# Patient Record
Sex: MALE | Race: WHITE | Employment: OTHER | URBAN - METROPOLITAN AREA
[De-identification: names, ages, dates, MRNs, and addresses within clinical notes are randomized per-mention and may not be internally consistent; named-entity substitution may affect disease eponyms.]

---

## 2017-01-01 ENCOUNTER — HOSPITAL ENCOUNTER (OUTPATIENT)
Dept: OTHER | Age: 77
Discharge: HOME | End: 2017-01-31
Attending: FAMILY MEDICINE | Admitting: INTERNAL MEDICINE

## 2017-05-24 PROBLEM — M17.11 OSTEOARTHRITIS OF RIGHT KNEE: Status: ACTIVE | Noted: 2017-05-24

## 2017-06-15 ENCOUNTER — HOSPITAL ENCOUNTER (OUTPATIENT)
Dept: ULTRASOUND IMAGING | Age: 77
Discharge: HOME | End: 2017-06-15
Attending: FAMILY MEDICINE | Admitting: FAMILY MEDICINE

## 2017-06-15 DIAGNOSIS — I71.40 ABDOMINAL AORTIC ANEURYSM WITHOUT RUPTURE: ICD-10-CM

## 2017-08-03 ENCOUNTER — HOSPITAL ENCOUNTER (OUTPATIENT)
Dept: PHYSICAL THERAPY | Age: 77
Discharge: HOME | End: 2017-08-31
Attending: FAMILY MEDICINE | Admitting: FAMILY MEDICINE

## 2017-08-04 ASSESSMENT — PAIN DESCRIPTION - LOCATION: LOCATION: BACK

## 2017-08-04 ASSESSMENT — PAIN DESCRIPTION - DESCRIPTORS: DESCRIPTORS: ACHING

## 2017-08-04 ASSESSMENT — PAIN SCALES - GENERAL: PAINLEVEL_OUTOF10: 2

## 2017-08-04 ASSESSMENT — PAIN DESCRIPTION - ORIENTATION: ORIENTATION: LOWER

## 2017-08-15 ENCOUNTER — HOSPITAL ENCOUNTER (OUTPATIENT)
Dept: PHYSICAL THERAPY | Age: 77
Discharge: HOME | End: 2017-08-15
Admitting: FAMILY MEDICINE

## 2017-08-21 ENCOUNTER — HOSPITAL ENCOUNTER (OUTPATIENT)
Dept: PHYSICAL THERAPY | Age: 77
Discharge: HOME | End: 2017-08-21
Admitting: FAMILY MEDICINE

## 2017-08-25 ENCOUNTER — HOSPITAL ENCOUNTER (OUTPATIENT)
Dept: PHYSICAL THERAPY | Age: 77
Discharge: HOME | End: 2017-08-25
Admitting: FAMILY MEDICINE

## 2017-09-01 ENCOUNTER — HOSPITAL ENCOUNTER (OUTPATIENT)
Dept: OTHER | Age: 77
Discharge: HOME | End: 2017-09-30
Attending: FAMILY MEDICINE | Admitting: FAMILY MEDICINE

## 2017-11-14 ENCOUNTER — HOSPITAL ENCOUNTER (OUTPATIENT)
Dept: PHYSICAL THERAPY | Age: 77
Discharge: HOME | End: 2017-11-30
Attending: ORTHOPAEDIC SURGERY | Admitting: ORTHOPAEDIC SURGERY

## 2017-11-14 ASSESSMENT — PAIN DESCRIPTION - FREQUENCY: FREQUENCY: CONTINUOUS

## 2017-11-14 ASSESSMENT — PAIN DESCRIPTION - ONSET: ONSET: PROGRESSIVE

## 2017-11-14 ASSESSMENT — PAIN DESCRIPTION - DESCRIPTORS: DESCRIPTORS: ACHING;SHARP

## 2017-11-14 ASSESSMENT — PAIN DESCRIPTION - PAIN TYPE: TYPE: SURGICAL PAIN;CHRONIC PAIN

## 2017-11-14 ASSESSMENT — PAIN DESCRIPTION - ORIENTATION: ORIENTATION: RIGHT

## 2017-11-14 ASSESSMENT — PAIN SCALES - GENERAL: PAINLEVEL_OUTOF10: 4

## 2017-11-14 ASSESSMENT — PAIN DESCRIPTION - LOCATION: LOCATION: BACK;LEG;KNEE

## 2017-11-14 ASSESSMENT — PAIN DESCRIPTION - PROGRESSION: CLINICAL_PROGRESSION: GRADUALLY IMPROVING

## 2017-11-14 NOTE — PROGRESS NOTES
Physical Therapy  Initial Assessment  Date: 2017  Patient Name: Rey Crockett  MRN: [id number]  : 1940     Treatment Diagnosis: R knee pain, stiffness, LBP Hx    Restrictions  Position Activity Restriction  Other position/activity restrictions: please review Medical Hx--cardiac, AAA, DM, back injury. ..... 20# lifting limit    Subjective   General  Chart Reviewed: Yes  Patient assessed for rehabilitation services?: Yes  Additional Pertinent Hx: please see electronic medical record for co-morbidities, several significant ones. DOS 10/16/17, Pain for about 2 years and just couldn't tolerate any more. L knee is not as bad as R was. Back pain still an issue and sees pain management for this. Stayed 3 days in hospital and some PT there, home care PT ended last week. Did have some medication issues postop and used CPM about 8 days but bothered LB so stopped. Family / Caregiver Present: Yes  Referring Practitioner: Roney Benson  Diagnosis: R TKA  General Comment  Comments: sees new pain doc today so will know more about this management after today. Subjective  Subjective: Pain is up and down, some pain w/ bending and straightening and walking. Using walker mostly outside home but at times nothing at home. No AD used before surgery. Ice helps some. rest too prn  Pain Screening  Patient Currently in Pain: Yes  Pain Assessment  Pain Assessment: 0-10  Pain Level: 4 (max still up to 10/10 but not all day)  Pain Type: Surgical pain;Chronic pain  Pain Location: Back;Leg;Knee  Pain Orientation: Right (central LB too)  Pain Radiating Towards: min from knee  Pain Descriptors: Aching; Sharp  Pain Frequency: Continuous  Pain Onset: Progressive  Clinical Progression: Gradually improving  Effect of Pain on Daily Activities: limits walking and WB activity and does wake him at night still too w/ some sleep loss  Patient's Stated Pain Goal: No pain (knee)  Vital Signs  Patient Currently in Pain: Yes    Vision/Hearing  Vision  Vision: Impaired (reading)  Hearing  Hearing: Exceptions to [facility]  Hearing Exceptions: Right hearing aid (but should have both)    Orientation  Orientation  Overall Orientation Status: Within Normal Limits    Social/Functional History  Social/Functional History  Lives With: Spouse  Type of Home: House  Home Layout: One level  Home Access: Stairs to enter with rails  Bathroom Equipment: Shower chair  Home Equipment: Rolling walker;Cane  Occupation: Retired  Leisure & Hobbies: walking  Objective     Observation/Palpation  Palpation: no warmth noted and min to mod TTP joint lines and around incision. Observation: walking w/ rolling walker but can take steps w/o AD. Edema: min noted    AROM RLE (degrees)  RLE General AROM: 8 from 0 on ext, flex 100    Strength RLE  Strength RLE: WFL  Comment: not true MMT done d/t postop, but good muscle contractions and no pain for knee and ankle 5/5  Strength LLE  Strength LLE: WNL     Additional Measures  Special Tests: NT d/t postop   Other: gait w/ straight cane, 30ft and did very well but some cues for pattern   Assessment   Conditions Requiring Skilled Therapeutic Intervention  Body structures, Functions, Activity limitations: Decreased functional mobility ; Decreased ADL status; Decreased ROM; Decreased endurance;Decreased balance;Decreased strength  Assessment: Pt is a 64 yo male who presents almost one month postop R TKA, but w/ significant medical Hx for back and cardiac issues. He demonstrates limited ROM and strength in R LE w/ decreased overall endurance and limited WB, decreased balance, and gait tolerance. He will beneift from PT to help restore his ROM, strength and balance and improve WB activity endurance/tolerance. Prior to 2015 he was able to walk 5-10 mins w/ min pain. Patient agrees with established plan of care and assisted in the development of their short term and long term goals.  Patient had no adverse reaction with initial treatment and there are no barriers to learning. Demonstrates no mental or cognitive disorder. Treatment Diagnosis: R knee pain, stiffness, LBP Hx  Prognosis: Excellent  Decision Making: Medium Complexity  Patient Education: see flow sheet  Barriers to Learning: none  REQUIRES PT FOLLOW UP: Yes         Plan   Plan  Times per week: 2x/week, drop to 1x/week prn  Plan weeks: 6 weeks  Specific instructions for Next Treatment: Continue to work on ROM and strength, balance and gait to tolerance  Current Treatment Recommendations: Strengthening, ROM, Balance Training, Functional Mobility Training, Endurance Training, Gait Training, Neuromuscular Re-education, Home Exercise Program, Modalities, Pain Management, Manual Therapy - Joint Manipulation, Manual Therapy - Soft Tissue Mobilization, Stair training    G-Code  PT G-Codes  Functional Assessment Tool Used: KOOS  Score: 46%  Functional Limitation: Mobility: Walking and moving around  Mobility: Walking and Moving Around Current Status (): At least 40 percent but less than 60 percent impaired, limited or restricted  Mobility: Walking and Moving Around Goal Status ():  At least 1 percent but less than 20 percent impaired, limited or restricted    OutComes Score  KOOS 46%  Goals  Short term goals  Time Frame for Short term goals: 3 weeks, 12/1/17  Short term goal 1: Pt will be able to report at least 25% reduction in overall pain  Short term goal 2: Pt will be able to walk 110ft w/o AD w/ good gait [facility]  Short term goal 3: Pt will be able to tolerate slowly advancing ROM and strength w/o increased back pain  Long term goals  Time Frame for Long term goals : 6 weeks, 12/22/17  Long term goal 1: Pt will be independent w/ HEP and able to continue to manage his pain on his own after PT  Long term goal 2: Pt will be able to walk >150 ft w/o AD and min pain or fatigue  Long term goal 3: Pt will have sufficient ROM for stairs and bending activity  Long term goal 4: Pt will have min knee pain w/ usual WB activity  Patient Goals   Patient goals : reduce pain, just make it another year, get around better, walk longer distances.          Cristi Worley, PT   PT, MPT, ATC     11/14/2017, 10:12 AM

## 2017-11-14 NOTE — PLAN OF CARE
[] Aquatic Therapy       Other:    ? Frequency/Duration:  # Days per week: [] 1 day # Weeks: [] 1 week [] 5 weeks     [x] 2 days?    [] 2 weeks [x] 6 weeks     [] 3 days   [] 3 weeks [] 7 weeks     [] 4 days   [] 4 weeks [] 8 weeks         [] 9 weeks [] 10 weeks         [] 11 weeks [] 12 weeks    Rehab Potential/Progress: [] Excellent [x] Good [] Fair  [] Poor     Goals:      Short term goals  Time Frame for Short term goals: 3 weeks, 12/1/17  Short term goal 1: Pt will be able to report at least 25% reduction in overall pain  Short term goal 2: Pt will be able to walk 110ft w/o AD w/ good gait mech  Short term goal 3: Pt will be able to tolerate slowly advancing ROM and strength w/o increased back pain  Long term goals  Time Frame for Long term goals : 6 weeks, 12/22/17  Long term goal 1: Pt will be independent w/ HEP and able to continue to manage his pain on his own after PT  Long term goal 2: Pt will be able to walk >150 ft w/o AD and min pain or fatigue  Long term goal 3: Pt will have sufficient ROM for stairs and bending activity  Long term goal 4: Pt will have min knee pain w/ usual WB activity    G-Code Selection: (On Eval and every 10th visit or Discharge)  MEASURE  [x] Mobility: Walking and Moving Around     [x] Current ()   [x] Goal ()   [] DC ()  [] Changing/Maintaining Body Position     [] Current (8981)      [] Goal ()   [] DC ()  [] Carrying / Moving / Handling Objects     [] Current ()   [] Goal ()   [] DC ()  [] Self-Care     [] Current ()   [] Goal ()   [] DC ()  [] Other PT/OT primary DX     [] Current ()   [] Goal ()   [] DC ()    SEVERITY  CURRENT  GOAL  DISCHARGE   [] CH (0% Impaired, Indep.)  [] CI (1-19% Impaired, SBA-CGA)  [] CJ (20-39% Impaired, MIN A)  [x] CK  (40-59% Impairment, Mod A)  [] CL  (60-79% Impairment, Max A)  [] CM  (80-99% Impairment, Dep.)   [] CN  (100% Impairment, Tot Dep.) [] CH (0% Impaired, Indep.)  [x] CI (1-19% Impaired, SBA-CGA)  [] CJ (20-39% Impaired, MIN A)  [] CK  (40-59% Impairment, Mod A)  [] CL  (60-79% Impairment, Max A)  [] CM  (80-99% Impairment, Dep.)   [] CN  (100% Impairment, Tot Dep.)  [] CH (0% Impaired, Indep.)  [] CI (1-19% Impaired, SBA-CGA)  [] CJ (20-39% Impaired, MIN A)  [] CK  (40-59% Impairment, Mod A)  [] CL  (60-79% Impairment, Max A)  [] CM  (80-99% Impairment, Dep.)   [] CN  (100% Impairment, Tot Dep.)          Electronically signed by:  Hillary Perkins, PT, MPT, ATC  11/14/2017, 11:12 AM    11/14/2017, 11:13 AM  If you have any questions or concerns, please don't hesitate to call.   Thank you for your referral.      Physician Signature:________________________________Date:_________ TIME: _____  By signing above, therapists plan is approved by physician

## 2017-11-14 NOTE — FLOWSHEET NOTE
squeeze   HEP                 Calf stretch, stand    instruct      Ham curl  --      Hip abd   --      Hip ext --      Balance: tandem   --      Partial squat   --                          gait Cane 30ftx2        Other Therapeutic Activities/Education:  Patient received education on their current pathology and how their condition effects them with their functional activities. Patient understood discussion and questions were answered. Patient understands their activity limitations and understands rational for treatment progression. Home Exercise Program:  Issued, practiced and pt demo ability to perform     Modality/intervention used:    [x] Therapeutic Exercise  [x] Modalities:  [x] Therapeutic Activity     [] Ultrasound  [] Elec  Stim  [] Gait Training      [] Cervical Traction [] Lumbar Traction  [x] Neuromuscular Re-education    [x] Cold/hotpack [] Iontophoresis   [x] Instruction in HEP      [] Vasopneumatic     [x] Manual Therapy               [] Aquatic Therapy     Manual Treatments:  PROM flex and ext 10'    Modalities:  CP to R knee 10' pt recumb    Communication with other providers:  POC faxed 11/14/17    Education provided to patient/caregiver: Adverse reactions to treatment:      Equipment provided:      Assessment:  Pt is a 64 yo male who presents almost one month postop R TKA, but w/ significant medical Hx for back and cardiac issues. He demonstrates limited ROM and strength in R LE w/ decreased overall endurance and limited WB, decreased balance, and gait tolerance. He will beneift from PT to help restore his ROM, strength and balance and improve WB activity endurance/tolerance. Prior to 2015 he was able to walk 5-10 mins w/ min pain. Patient agrees with established plan of care and assisted in the development of their short term and long term goals. Patient had no adverse reaction with initial treatment and there are no barriers to learning. Demonstrates no mental or cognitive disorder. Time In / Time Out:  0910/1005                 Timed Code/Total Treatment Minutes:  25/55    Patients Report of Tolerance:    [] Patient limited by fatigue        [x] Patient limited by pain   [] Patient limited by other medical complications   [] Other:     Prognosis:   [x] Good [] Fair  [] Poor    Plan:   [] Continue per plan of care [] Alter current plan (see comments)  [x] Plan of care initiated [] Hold pending MD visit [] Discharge    Plan for Next Session:   Continue to work on ROM and strength, balance and gait to tolerance       Next Progress Note due:   Visit 10         Electronically signed by:  Tisha Huddleston, PT, MPT, ATC  11/14/2017, 11:16 AM    11/14/2017, 11:16 AM

## 2017-11-17 ENCOUNTER — HOSPITAL ENCOUNTER (OUTPATIENT)
Dept: PHYSICAL THERAPY | Age: 77
Discharge: HOME | End: 2017-11-17
Admitting: ORTHOPAEDIC SURGERY

## 2017-11-20 ENCOUNTER — HOSPITAL ENCOUNTER (OUTPATIENT)
Dept: PHYSICAL THERAPY | Age: 77
Discharge: HOME | End: 2017-11-20
Admitting: ORTHOPAEDIC SURGERY

## 2017-11-20 NOTE — FLOWSHEET NOTE
ROM, strength, and decrease pain. Reports of soreness after treatment.     Time In / Time Out:  0832/5297    Timed Code/Total Treatment Minutes: 46'/46'  3 TE 46'    Patients Report of Tolerance:    [] Patient limited by fatigue        [x] Patient limited by pain   [] Patient limited by other medical complications   [] Other:     Prognosis:   [x] Good [] Fair  [] Poor    Plan:   [x] Continue per plan of care [] Alter current plan (see comments)  [] Plan of care initiated [] Hold pending MD visit [] Discharge    Plan for Next Session:   Please check on POC and re-fax prn  Continue to work on ROM and strength, balance and gait to tolerance       Next Progress Note due:   Visit 10         Electronically signed by:  Tammi Rodriguez PTA           11/20/2017, 5:07 PM

## 2017-11-22 ENCOUNTER — HOSPITAL ENCOUNTER (OUTPATIENT)
Dept: PHYSICAL THERAPY | Age: 77
Discharge: HOME | End: 2017-11-22
Admitting: ORTHOPAEDIC SURGERY

## 2017-11-28 ENCOUNTER — HOSPITAL ENCOUNTER (OUTPATIENT)
Dept: PHYSICAL THERAPY | Age: 77
Discharge: HOME | End: 2017-11-28
Admitting: ORTHOPAEDIC SURGERY

## 2017-11-28 NOTE — FLOWSHEET NOTE
Outpatient Physical Therapy           [facility]           [x] Phone: [phone]   Fax: [phone]  Yamilka haro           [] Phone: [phone]   Fax: [phone]    Physical Therapy Daily Treatment Note  Date:  2017    Patient Name:  Shandra Soliman    :  1940  MRN: [id number]  Restrictions/Precautions: please review Medical Hx--cardiac, AAA, DM, back injury. ..... 20# lifting limit  Diagnosis:   Diagnosis: R TKA  Date of Surgery:  10/16/17  Treatment Diagnosis: Treatment Diagnosis: R knee pain, stiffness, LBP Hx    Insurance/Certification information:  [employer], $368.77 at eval  Referring Physician:  Referring Practitioner: Aleida Jules Doctor Visit:    Plan of care signed (Y/N):  YES  Visit# / total visits:    POC and script  Pain level: 4/10       Goals:       Short term goals  Time Frame for Short term goals: 3 weeks, 17  Short term goal 1: Pt will be able to report at least 25% reduction in overall pain  Mostly met  Short term goal 2: Pt will be able to walk 110ft w/o AD w/ good gait mech  Good progress, cane now  Short term goal 3: Pt will be able to tolerate slowly advancing ROM and strength w/o increased back pain  Met so far  Long term goals  Time Frame for Long term goals : 6 weeks, 17  Long term goal 1: Pt will be independent w/ HEP and able to continue to manage his pain on his own after PT  Long term goal 2: Pt will be able to walk >150 ft w/o AD and min pain or fatigue  Long term goal 3: Pt will have sufficient ROM for stairs and bending activity  Long term goal 4: Pt will have min knee pain w/ usual WB activity         Subjective:   Patient states he's feeling ok today with just soreness on knee. Reports feeling great after he wake up, but felt soreness after a few hours. Any changes in Ambulatory Summary Sheet? NO        Objective:  Unstable while performing balance activities with LOB quiet a bit.  States felt the pull on thigh while performing SLR and knee lag noted. Fatigues toward the end of ext heel prop and quad set.        Exercises:  Watch back pain  Exercise/Equipment 11/14/17 11/17/17 11/20/17 11/22/17 11/28/17   Nustep or Rec bike -- nustep 5' Lv1 Nu step 5' Lv 3 Nu step 5' Lv 3 Nu step            Towel stretch   30\" 30\" 30\" Gt belt 30\"x2 Gt belt 30\"x2   Quad set heel up   5x5\" 10x5\" 10x5\" 15x5\"    Passive ext   Heel prop  3x20\"  30\" 1.30\" min  1.30\" min 1.5' x2 1x5' x2   SAQ   -- 20x AAROM 20xAAROM*   Heelslides   AAROM 20x AAROM 20x AAROM 20x 20x AAROM 20x AAROM   LAQ/flex     -- 20x hold 2\" ea end -   SLR    HEP AAROM 10x2 AAROM 10x2 10x2 10x2   Bridge/glut squeeze   HEP Column bridge x20 x20 forgot x20               Calf stretch, stand    instruct FR 30\" Right FR 30\" right FR 30\" x2 FR 30\"x2   Heel raises   -- Floor 10x2 Floor 10x2   Ham curl  -- 10x 10* ea 10x ea R/L 10x ea R/L   Hip abd   -- 10x 10* ea 10x ea R/L 10x ea R/L   Hip ext -- 10x 10* ea 10x ea R/L 10x ea R/L   Balance: tandem   -- R/L tandem 1x30\" occasional UE support R/L tandem 1x30\" occasional UE support 30\" ea, CGA 30\" ea CGA   Wobble board balance   -- 30\"ea, CGA 30\" ea, CGA   Partial squat   -- 10x 10x To YSB 10x, holding rail To YSB 10x, holding rail             Gait Cane 30ftx2 Cane in L, 45'x5  -- --      Other Therapeutic Activities/Education:      Home Exercise Program:   No change to the current exercise program.    Modality/intervention used:    [x] Therapeutic Exercise  [x] Modalities:  [x] Therapeutic Activity     [] Ultrasound  [] Elec  Stim  [] Gait Training      [] Cervical Traction [] Lumbar Traction  [x] Neuromuscular Re-education    [x] Cold/hotpack [] Iontophoresis   [x] Instruction in HEP      [] Vasopneumatic     [x] Manual Therapy               [] Aquatic Therapy     Manual Treatments:  Modalities:     Vaso low to R knee, pt supine, 10'  Communication with other providers:  POC faxed 11/14/17    Education provided to patient/caregiver:  I    Adverse reactions to

## 2017-12-01 ENCOUNTER — HOSPITAL ENCOUNTER (OUTPATIENT)
Dept: OTHER | Age: 77
Discharge: HOME | End: 2017-12-31
Attending: ORTHOPAEDIC SURGERY | Admitting: ORTHOPAEDIC SURGERY

## 2017-12-07 ENCOUNTER — HOSPITAL ENCOUNTER (OUTPATIENT)
Dept: PHYSICAL THERAPY | Age: 77
Discharge: HOME | End: 2017-12-07
Admitting: ORTHOPAEDIC SURGERY

## 2017-12-07 NOTE — FLOWSHEET NOTE
does not have any Red flags or absolute contraindications. Patient was instructed the use of the [employer] on the skin may cause some discomfort/pain ami of the skin, bruising or Petechiae. Patient understands these risks and has agreed to continue with the intervention. GT was applied to the R quad, ham, calf and anterior LE because the assessment demonstrated tightness of all . The appropriate GT tool/tools and which stroke technique utilized were determined based on the assessment and treatment goals. The patients skin at the end of the treatment showed minimal changes overall. Overall GT treatment time12', plus calf, ham and knee flex stretches. Modalities:     Vaso low to R knee, pt recumb w/ leg on blue elevator, 10'  Communication with other providers:  POC faxed 11/14/17    Education provided to patient/caregiver:  I    Adverse reactions to treatment:  none    Equipment provided:  none    Assessment:  Patient tolerated Rx fairly well today. He continues to lack some knee ext and demonstrage instability with balance activities as well. Patient will continue to benefit from further therapy to increase ROM, strength, and decrease pain. Reports of slight soreness and w/ 0-1/10 pain after treatment. Time In / Time Out:   0930/1030    Timed Code/Total Treatment Minutes:    50/60     Patients Report of Tolerance:    [] Patient limited by fatigue        [x] Patient limited by pain/stiffness   [] Patient limited by other medical complications   [] Other:     Prognosis:   [x] Good [] Fair  [] Poor    Plan:   [x] Continue per plan of care [] Alter current plan (see comments)  [] Plan of care initiated [] Hold pending MD visit [] Discharge    Plan for Next Session:   Continue to work on ROM and strength, balance and gait to tolerance. Eventually work into more dynamic balance activity as able.        Next Progress Note due:   Visit 10         Electronically signed by:  Hillary Perkins

## 2017-12-12 ENCOUNTER — HOSPITAL ENCOUNTER (OUTPATIENT)
Dept: PHYSICAL THERAPY | Age: 77
Discharge: HOME | End: 2017-12-12
Admitting: ORTHOPAEDIC SURGERY

## 2017-12-12 NOTE — FLOWSHEET NOTE
like fever or nausea, decreased appetite. Home Exercise Program:   No change to the current exercise program.    Modality/intervention used:    [x] Therapeutic Exercise  [x] Modalities:  [x] Therapeutic Activity     [] Ultrasound  [] Elec  Stim  [] Gait Training      [] Cervical Traction [] Lumbar Traction  [x] Neuromuscular Re-education    [x] Cold/hotpack [] Iontophoresis   [x] Instruction in HEP      [] Vasopneumatic     [x] Manual Therapy               [] Aquatic Therapy     Manual Treatments:              Modalities:     Vaso low to R knee, pt recumb w/ leg on blue elevator, 10'  Communication with other providers:  POC faxed 11/14/17    Education provided to patient/caregiver:  I    Adverse reactions to treatment:  none    Equipment provided:  none    Assessment:  Patient tolerated session well today without an increase in back or leg pain. Patient displays moderate deficits in balance with poor ankle/hip strategies for regaining balance when lost posteriorly. Making some good progress with knee ROM, although still lacking a few degrees of terminal knee extension. Patient will continue to benefit from skilled PT services in order to improve, strength, endurance, and balance to reduce pain, limitation and improve function. 4-5/10 pain in knee 0/10 pain in back after rx. Time In / Time Out:   1345/1435    Timed Code/Total Treatment Minutes:  40'/50' 1 vaso 10'   1 NR 8'   2 TE 32'    Patients Report of Tolerance:    [] Patient limited by fatigue        [x] Patient limited by pain/stiffness   [] Patient limited by other medical complications   [] Other:     Prognosis:   [x] Good [] Fair  [] Poor    Plan:   [x] Continue per plan of care [] Alter current plan (see comments)  [] Plan of care initiated [] Hold pending MD visit [] Discharge    Plan for Next Session:   Continue to work on ROM and strength, balance and gait to tolerance. Eventually work into more dynamic balance activity as able.  Monitor RUQ

## 2017-12-14 ENCOUNTER — HOSPITAL ENCOUNTER (OUTPATIENT)
Dept: PHYSICAL THERAPY | Age: 77
Discharge: HOME | End: 2017-12-14
Admitting: ORTHOPAEDIC SURGERY

## 2017-12-14 NOTE — FLOWSHEET NOTE
Outpatient Physical Therapy           [facility]           [x] Phone: [phone]   Fax: [phone]  Wilfredo Patterson           [] Phone: [phone]   Fax: [phone]    Physical Therapy Daily Treatment Note  Date:  2017    Patient Name:  Rosaura Aragon    :  1940  MRN: [id number]  Restrictions/Precautions: please review Medical Hx--cardiac, AAA, DM, back injury. ..... 20# lifting limit  Diagnosis:   Diagnosis: R TKA  Date of Surgery:  10/16/17  Treatment Diagnosis: Treatment Diagnosis: R knee pain, stiffness, LBP Hx    Insurance/Certification information:  [employer], $368.77 at eval  Referring Physician:  Referring Practitioner: Diane Rayo  Next Doctor Visit:  Feb  Plan of care signed (Y/N):  YES  Visit# / total visits:    POC and script  Pain level: 5/10 knee, 0/10 back        Goals:       Short term goals  Time Frame for Short term goals: 3 weeks, 17  Short term goal 1: Pt will be able to report at least 25% reduction in overall pain  Mostly met  Short term goal 2: Pt will be able to walk 110ft w/o AD w/ good gait mech  Good progress, cane now  Short term goal 3: Pt will be able to tolerate slowly advancing ROM and strength w/o increased back pain  Met so far  Long term goals  Time Frame for Long term goals : 6 weeks, 17  Long term goal 1: Pt will be independent w/ HEP and able to continue to manage his pain on his own after PT  Long term goal 2: Pt will be able to walk >150 ft w/o AD and min pain or fatigue  Long term goal 3: Pt will have sufficient ROM for stairs and bending activity  Long term goal 4: Pt will have min knee pain w/ usual WB activity         Subjective:  Patient reports that the knee is hurting him today on the inside, not sure why. Back is feeling fine. Pain in the RUQ is the same, no better/no worse. Any changes in Ambulatory Summary Sheet? Pt fell 17, sustained a vertebral Fx        Objective: Tender to palpation MCL.  Tightness distal quad but functional independence. Will continue to benefit from skilled PT services to improve gait safety, balance, and overall strength and endurance. 0/10 pain after rx. Time In / Time Out:  1343/1446    Timed Code/Total Treatment Minutes:  53'/63' 1 vaso 10'  1 NR 8'  1 man 10' 2 TE 33'    Patients Report of Tolerance:    [] Patient limited by fatigue        [x] Patient limited by pain/stiffness   [] Patient limited by other medical complications   [] Other:     Prognosis:   [x] Good [] Fair  [] Poor    Plan:   [x] Continue per plan of care [] Alter current plan (see comments)  [] Plan of care initiated [] Hold pending MD visit [] Discharge    Plan for Next Session:   Continue to work on ROM and strength, balance and gait to tolerance. Eventually work into more dynamic balance activity as able. Monitor RUQ pain.       Next Progress Note due:   Visit 10         Electronically signed by:  Khalif Morse PTA        12/14/2017, 8:16 AM

## 2017-12-19 ENCOUNTER — HOSPITAL ENCOUNTER (OUTPATIENT)
Dept: PHYSICAL THERAPY | Age: 77
Discharge: HOME | End: 2017-12-19
Admitting: ORTHOPAEDIC SURGERY

## 2017-12-19 NOTE — FLOWSHEET NOTE
Outpatient Physical Therapy           [place]           [x] Phone: [phone]   Fax: [phone]  [place]           [] Phone: [phone]   Fax: [phone]    Physical Therapy Daily Treatment Note  Date:  2017    Patient Name:  Rey Crockett    :  1940  MRN: [id number]  Restrictions/Precautions: please review Medical Hx--cardiac, AAA, DM, back injury. ..... 20# lifting limit  Diagnosis:   Diagnosis: R TKA  Date of Surgery:  10/16/17  Treatment Diagnosis: Treatment Diagnosis: R knee pain, stiffness, LBP Hx    Insurance/Certification information:  [employer], $368.77 at eval  Referring Physician:  Referring Practitioner: Roney Jules Doctor Visit:  Feb  Plan of care signed (Y/N):  YES  Visit# / total visits:    POC and script  Pain level: 3/10 knee, 0/10 back        Goals:       Short term goals  Time Frame for Short term goals: 3 weeks, 17  Short term goal 1: Pt will be able to report at least 25% reduction in overall pain  Mostly met  Short term goal 2: Pt will be able to walk 110ft w/o AD w/ good gait mech  Good progress, cane now  Short term goal 3: Pt will be able to tolerate slowly advancing ROM and strength w/o increased back pain  Met so far  Long term goals  Time Frame for Long term goals : 6 weeks, 17  Long term goal 1: Pt will be independent w/ HEP and able to continue to manage his pain on his own after PT  Long term goal 2: Pt will be able to walk >150 ft w/o AD and min pain or fatigue  Long term goal 3: Pt will have sufficient ROM for stairs and bending activity  Long term goal 4: Pt will have min knee pain w/ usual WB activity         Subjective:  Patient states that the knee has been hurting quite a bit for about a day after his therapy sessions. RUQ pain is getting better. Wife states that he tried to drive the other day and he was having a lot of trouble keeping the car on the road.  Patient states that he has a lot of difficulty lifting his foot/leg from the gas to the break states \"my leg feels like it weighs 100# pounds. \" Wife states that patient has been different the last few weeks, seeming more confused and forgetful. Not sure if maybe he is having more TIA's? BP has been higher too so has an appointment with heart doc on Thursday. Had another fall 2 days ago, went to sit on the couch and fell between the coffee table and the couch. Any changes in Ambulatory Summary Sheet? Pt fell 11/30/17, sustained a vertebral Fx        Objective: /86 HR 58 bpm            Exercises:  Watch back painand new Fx  Exercises 12/14/17 12/19/17          Nu-step (pillow behind back) L3 5' -      -    Balance   -    Tandem stance 30\" ea  -    Wobble board s/s, a/p 30\" ea  -    Airex marches  - -    Airex balance w/perturbations or reaching - -    Gait w/head turns 2*50' -      -    Strengthening   -    Standing HS curl 20* -    Standing hip abd  - -    Fitter leg press  1 Black Cord 2*10 -    Sit-Stand from mat table  -    Seated iso press ball into lap for abd brace 10*5\" -    Seated add squeeze w/abd brace 10*5\" -    LAQ  2*10 -    SAQ  2# 2*10 -    Glut squeezes  15*5\" -      -                              Other Therapeutic Activities/Education: Extensive discussion/education regarding patient's current condition, changes over the last week or so, tips for avoiding falls at home, appropriate exercises and when to take a day off from exercises according to symptoms. Discussed Ensure for proper nutrition and energy for daily activities.      Home Exercise Program:   No change to the current exercise program.    Modality/intervention used:    [x] Therapeutic Exercise  [x] Modalities:  [x] Therapeutic Activity     [] Ultrasound  [] Elec  Stim  [] Gait Training      [] Cervical Traction [] Lumbar Traction  [x] Neuromuscular Re-education    [x] Cold/hotpack [] Iontophoresis   [x] Instruction in HEP      [] Vasopneumatic     [x] Manual Therapy               [] Aquatic

## 2017-12-28 ENCOUNTER — HOSPITAL ENCOUNTER (OUTPATIENT)
Dept: PHYSICAL THERAPY | Age: 77
Discharge: HOME | End: 2017-12-28
Admitting: ORTHOPAEDIC SURGERY

## 2017-12-28 NOTE — FLOWSHEET NOTE
Outpatient Physical Therapy           [place]           [x] Phone: [phone]   Fax: [phone]  Ernestine Marilu           [] Phone: [phone]   Fax: [phone]    Physical Therapy Daily Treatment Note  Date:  2017    Patient Name:  Mark Hassan    :  1940  MRN: [id number]  Restrictions/Precautions: please review Medical Hx--cardiac, AAA, DM, back injury. ..... 20# lifting limit  Diagnosis:   Diagnosis: R TKA  Date of Surgery:  10/16/17  Treatment Diagnosis: Treatment Diagnosis: R knee pain, stiffness, LBP Hx    Insurance/Certification information:  [employer], $368.77 at eval  Referring Physician:  Referring Practitioner: Chris Jules Doctor Visit:  Feb  Plan of care signed (Y/N):  YES  Visit# / total visits:   - new POC   Pain level: 0/10 knee pain at rest, 3/10 knee with walking, 0/10 back pain        Goals:       Short term goals  Time Frame for Short term goals: 3 weeks, 17  Short term goal 1: Pt will be able to report at least 25% reduction in overall pain  Mostly met  Short term goal 2: Pt will be able to walk 110ft w/o AD w/ good gait mech  Good progress, cane now   Short term goal 3: Pt will be able to tolerate slowly advancing ROM and strength w/o increased back pain  Met so far  Long term goals  Time Frame for Long term goals : 6 weeks, 17  Long term goal 1: Pt will be independent w/ HEP and able to continue to manage his pain on his own after PT Partially met - reports occasional compliance (does a few here and a few there)  Long term goal 2: Pt will be able to walk >150 ft w/o AD and min pain or fatigue Progressing - mostly limited by lack of balance   Long term goal 3: Pt will have sufficient ROM for stairs and bending activity Met   Long term goal 4: Pt will have min knee pain w/ usual WB activity Mostly met            Subjective: Any changes in Ambulatory Summary Sheet? Pt fell 17, sustained a vertebral Fx.  Started Norvasc 2.5mg once daily for BP         Objective:    Pt SBA w/ all balance today. AAROM 5-120 R knee.             Exercises:  Watch back pain and new Fx  Exercises 12/14/17 12/19/17 12/26/17 12/28/17          Nu-step (pillow behind back) L3 5' - L5 5' Rec bike 5'     - -    Balance   - -    Tandem stance 30\" ea  - 30\" ea  30\"ea   Wobble board s/s, a/p 30\" ea  - 30\" ea  30\"ea   Airex marches  - - 2*30\" 2x30\"   Airex balance w/perturbations or reaching - - - 10xea touches, 10x ball toss and narrow stance 10x too, w/    Gait w/head turns 2*50' - - -     - -    Strengthening   - - -   Standing HS curl 20* - 20*  -   Standing hip abd  - - - -   Fitter leg press  1 Black Cord 2*10 - 1 black cord/ 1 blue cord 2*10 1 black cord/ 1 blue cord 2x10   Sit-Stand from mat table  - Squat at TM, butt to YSB 10* Squat at TM, butt to YSB 10x2   Seated iso press ball into lap for abd brace 10*5\" - - --   Seated add squeeze w/abd brace 10*5\" - - --   LAQ  2*10 - 2*10 --   SAQ  2# 2*10 - - 20x2\"   Glut squeezes  15*5\" - -    Seated HS curl   - GTB 2*10 RTB 10x2                                 Other Therapeutic Activities/Education:    Home Exercise Program:   No change to the current exercise program.    Modality/intervention used:    [x] Therapeutic Exercise  [x] Modalities:  [x] Therapeutic Activity     [] Ultrasound  [] Elec  Stim  [] Gait Training      [] Cervical Traction [] Lumbar Traction  [x] Neuromuscular Re-education    [x] Cold/hotpack [] Iontophoresis   [x] Instruction in HEP      [] Vasopneumatic     [x] Manual Therapy               [] Aquatic Therapy     Manual Treatments: PROM into extension and flexion, HS stretching, CFM at knee, total time 10'          Modalities:     Vaso no pressure 10', B legs elevated  Communication with other providers:  POC faxed 11/14/17, see note 12/26/17    Education provided to patient/caregiver:  I    Adverse reactions to treatment:  none    Equipment provided:  none    Assessment:

## 2018-01-01 ENCOUNTER — HOSPITAL ENCOUNTER (OUTPATIENT)
Dept: OTHER | Age: 78
Discharge: HOME | End: 2018-01-31
Attending: ORTHOPAEDIC SURGERY | Admitting: ORTHOPAEDIC SURGERY

## 2018-01-10 ENCOUNTER — HOSPITAL ENCOUNTER (OUTPATIENT)
Dept: PHYSICAL THERAPY | Age: 78
Discharge: HOME | End: 2018-01-10
Admitting: ORTHOPAEDIC SURGERY

## 2018-01-10 NOTE — FLOWSHEET NOTE
Outpatient Physical Therapy           [place]           [x] Phone: [phone]   Fax: [phone]  Martintrevin Echols           [] Phone: [phone]   Fax: [phone]    Physical Therapy Daily Treatment Note  Date:  1/10/2018    Patient Name:  Coretta Hurst    :  1940  MRN: [id number]  Restrictions/Precautions: please review Medical Hx--cardiac, AAA, DM, back injury. ..... 20# lifting limit  Diagnosis:   Diagnosis: R TKA  Date of Surgery:  10/16/17  Treatment Diagnosis: Treatment Diagnosis: R knee pain, stiffness, LBP Hx    Insurance/Certification information:  [employer], $368.77 at eval  Referring Physician:  Referring Practitioner: Desiree Harley  Next Doctor Visit:  Feb  Plan of care signed (Y/N):  YES  Visit# / total visits:   - new POC   Pain level: 5/10 knee pain at rest, 0/10 back pain      Goals:       Short term goals  Time Frame for Short term goals: 3 weeks, 17  Short term goal 1: Pt will be able to report at least 25% reduction in overall pain  Mostly met  Short term goal 2: Pt will be able to walk 110ft w/o AD w/ good gait mech  Good progress, cane now   Short term goal 3: Pt will be able to tolerate slowly advancing ROM and strength w/o increased back pain  Met so far  Long term goals  Time Frame for Long term goals : 6 weeks, 17  Long term goal 1: Pt will be independent w/ HEP and able to continue to manage his pain on his own after PT Partially met - reports occasional compliance (does a few here and a few there)  Long term goal 2: Pt will be able to walk >150 ft w/o AD and min pain or fatigue Progressing - mostly limited by lack of balance   Long term goal 3: Pt will have sufficient ROM for stairs and bending activity Met   Long term goal 4: Pt will have min knee pain w/ usual WB activity Mostly met            Subjective: Patient states that he feels okay, knee is a little sore. Arnaud Mireya again yesterday and hit his knee. Back feels fine.         Any changes in Ambulatory Summary Sheet? Pt fell 11/30/17, sustained a vertebral Fx. Started Norvasc 2.5mg once daily for BP         Objective: Required BUE assist and CGA from therapist to get up from chair with 1 airex pad and without UE use. Able to perform sit-stand without UE support with 2 airex pads on chair. Exercises:  Watch back pain and new Fx   1/10/18           Nu-step/Rec Bike 5'            Calf raises  20*     Toe raises  -     Balance board taps a/p, s/s 15* ea     Balance board balance a/p, s/s 30\" ea     Step to BOSU -     Step up onto airex pad -     Airex marches 2*30\"     Step tap cone -     Step up/over cone -     Standing hip abd/ext  15* ea B      Squat and reach -     Tandem stance  -     Cone walk  -     Sit-Stand from chair + 2 airex 2*10     Fitter LP  -     HS curl  -     LAQ  -                                                       Other Therapeutic Activities/Education: Education and discussion regarding current issues (memory, cognitive status, balance, and leg feeling very heavy at times) and what specialist may be necessary to follow up with. Education provided on getting up every hour at home to keep muscles limber and working, remain active. Discussed potential safety hazards at home and what to do to reduce fall risk.      Home Exercise Program:   No change to the current exercise program.    Modality/intervention used:    [x] Therapeutic Exercise  [x] Modalities:  [x] Therapeutic Activity     [] Ultrasound  [] Elec  Stim  [] Gait Training      [] Cervical Traction [] Lumbar Traction  [x] Neuromuscular Re-education    [x] Cold/hotpack [] Iontophoresis   [x] Instruction in HEP      [] Vasopneumatic     [x] Manual Therapy               [] Aquatic Therapy     Manual Treatments: Modalities:    Bag of ice to go  Communication with other providers:  POC faxed 11/14/17, see note 12/26/17    Education provided to patient/caregiver:  none    Adverse reactions to treatment:  none    Equipment

## 2018-02-01 ENCOUNTER — HOSPITAL ENCOUNTER (OUTPATIENT)
Dept: OTHER | Age: 78
Discharge: HOME | End: 2018-02-28
Attending: ORTHOPAEDIC SURGERY | Admitting: ORTHOPAEDIC SURGERY

## 2018-02-23 ENCOUNTER — HOSPITAL ENCOUNTER (OUTPATIENT)
Dept: PHYSICAL THERAPY | Age: 78
Discharge: HOME | End: 2018-02-23
Admitting: ORTHOPAEDIC SURGERY

## 2018-02-23 NOTE — FLOWSHEET NOTE
Outpatient Physical Therapy           [place]           [x] Phone: [phone]   Fax: [phone]  [place]           [] Phone: [phone]   Fax: [phone]    Physical Therapy Daily Treatment Note  Date:  2018    Patient Name:  Zenon Buerger    :  1940  MRN: [id number]  Restrictions/Precautions: please review Medical Hx--cardiac, AAA, DM, back injury. ..... 20# lifting limit  Diagnosis:   Diagnosis:   Balance deficits, possible PD  Date of Surgery:  10/16/17  Treatment Diagnosis: Treatment Diagnosis: R knee pain, stiffness, LBP, altered balance    Insurance/Certification information:  [employer], $390.12--18  Referring Physician:  Referring Practitioner: Thea Tapia  Next Doctor Visit:    Plan of care signed (Y/N):  pending  Visit# / total visits:      -12   Pain level: 5/10 R knee       Goals:       Short term goals  Time Frame for Short term goals: 3 weeks, 3/16/18  Short term goal 1: Pt will be able to report no falls at home      Short term goal 2: Pt will be able to walk 110ft w/o AD w/ good gait mech and looking where he's going     Short term goal 3: Pt will be able to tolerate slowly advancing balance exercises w/o increased pain     Long term goals  Time Frame for Long term goals : 6 weeks, 18  Long term goal 1: Pt will be independent w/ HEP and able to continue to work on balance on his own after PT    Long term goal 2: Pt will be able to walk >150 ft w/o AD and min pain or fatigue and no LOB    Long term goal 3: Pt will be able to change directions and turn head w/ usual activity and no LOB     Long term goal 4: Pt will have be able to report no falls since starting PT.             Subjective:   Pt has pain in knee still and aggravated today. Also hurting in tailbone from a fall on this about 3 weeks ago now. Pt reports increased fall incidences over the last few months.   He reports that balance exercise done previously have helped some w/ marching and tandem

## 2018-02-23 NOTE — PROGRESS NOTES
Outpatient Physical Therapy           [place]           [x] Phone: [phone]   Fax: [phone]  [facility]           [] Phone: [phone]   Fax: [phone]      To: Dr. Max Umanzor      From: Donal Ziegler PT, MPT, ATC      Patient: Coretta Hurst                  : 1940  Diagnosis: R TKA, balance deficits   Date: 2018  Treatment Diagnosis:  R knee pain, stiffness, LBP, altered balance. [x]  Progress Note/Balance Eval             []  Discharge Note    Evaluation Date:  17 Total Visits to date:  (25) 1  Cancels/No-shows to date:  0    Subjective:   Pt has pain in knee still and aggravated today. Also hurting in tailbone from a fall on this about 3 weeks ago now. Pt reports increased fall incidences over the last few months. He reports that balance exercise done previously have helped some w/ marching and tandem stance, but if moves head or changes focus he loses balance. Dr Max Umanzor is also testing him for Parkinson's and has started him on a medicine for this and sees him again next week for follow up. He reports falling 5x since January but prior to this didn't even have 5 falls in all of 2017. He has Fx vertebrae from 17 and bruised ribs more recently and now tailbone too. Falls mostly due to LOB, not dizzy or knee buckle or anything. Denies any black outs or woozy feeling, but wife reports some confusion at times. Pt will use a cane on more uneven surfaces or for longer time frames. Plan of Care/Treatment to date:  [x] Therapeutic Exercise    [x] Modalities:  [x] Therapeutic Activity     [] Ultrasound  [] Electrical Stimulation  [] Gait Training      [] Cervical Traction   [] Lumbar Traction  [x] Neuromuscular Re-education  [x] Cold/hotpack [] Iontophoresis  [x] Instruction in HEP      Other:  [x] Manual Therapy       []  Vasopneumatic  [] Aquatic Therapy       []                    ? Objective/Significant Findings At Last Visit/Comments:  TUG 14 sec.

## 2018-02-26 ENCOUNTER — HOSPITAL ENCOUNTER (OUTPATIENT)
Dept: PHYSICAL THERAPY | Age: 78
Discharge: HOME | End: 2018-02-26
Admitting: ORTHOPAEDIC SURGERY

## 2018-02-26 NOTE — FLOWSHEET NOTE
Outpatient Physical Therapy           [place]           [x] Phone: [phone]   Fax: [phone]  Suleimannestor Gupta           [] Phone: [phone]   Fax: [phone]    Physical Therapy Daily Treatment Note  Date:  2018    Patient Name:  Naseem España    :  1940  MRN: [id number]  Restrictions/Precautions: please review Medical Hx--cardiac, AAA, DM, back injury. ..... 20# lifting limit  Diagnosis:   Diagnosis:   Balance deficits, possible PD  Date of Surgery:  10/16/17  Treatment Diagnosis: Treatment Diagnosis: R knee pain, stiffness, LBP, altered balance    Insurance/Certification information:  [employer], $390.12--18  Referring Physician:  Referring Practitioner: Benji Arceo  Next Doctor Visit:    Plan of care signed (Y/N):  pending  Visit# / total visits:      -   Pain level: \"sore\"/10 R knee       Goals:       Short term goals  Time Frame for Short term goals: 3 weeks, 3/16/18  Short term goal 1: Pt will be able to report no falls at home      Short term goal 2: Pt will be able to walk 110ft w/o AD w/ good gait mech and looking where he's going     Short term goal 3: Pt will be able to tolerate slowly advancing balance exercises w/o increased pain     Long term goals  Time Frame for Long term goals : 6 weeks, 18  Long term goal 1: Pt will be independent w/ HEP and able to continue to work on balance on his own after PT    Long term goal 2: Pt will be able to walk >150 ft w/o AD and min pain or fatigue and no LOB    Long term goal 3: Pt will be able to change directions and turn head w/ usual activity and no LOB     Long term goal 4: Pt will have be able to report no falls since starting PT.             Subjective:  Pt reports sore in LE's after Rx on Fri and even still some today. Not true pain but sore. Any changes in Ambulatory Summary Sheet? no       Objective:  Pretty strong hold for alt DF/PF noted.    Pt struggles a good bit w/ tandem stance and head turns, but

## 2018-03-01 ENCOUNTER — HOSPITAL ENCOUNTER (OUTPATIENT)
Dept: OTHER | Age: 78
Discharge: HOME | End: 2018-03-31
Attending: ORTHOPAEDIC SURGERY | Admitting: ORTHOPAEDIC SURGERY

## 2018-03-07 ENCOUNTER — HOSPITAL ENCOUNTER (OUTPATIENT)
Dept: PHYSICAL THERAPY | Age: 78
Discharge: HOME | End: 2018-03-07
Admitting: ORTHOPAEDIC SURGERY

## 2018-03-07 NOTE — FLOWSHEET NOTE
Outpatient Physical Therapy           [place]           [x] Phone: [phone]   Fax: [phone]  Darn[facility] Landers           [] Phone: [phone]   Fax: [phone]    Physical Therapy Daily Treatment Note  Date:  3/7/2018    Patient Name:  Percy Richardson    :  1940  MRN: [id number]  Restrictions/Precautions: please review Medical Hx--cardiac, AAA, DM, back injury. ..... 20# lifting limit  Diagnosis:   Diagnosis:   Balance deficits, possible PD  Date of Surgery:  10/16/17  Treatment Diagnosis: Treatment Diagnosis: R knee pain, stiffness, LBP, altered balance    Insurance/Certification information:  [place], $390.12--18  Referring Physician:  Referring Practitioner: Catia Villalba  Next Doctor Visit:    Plan of care signed (Y/N):  Y  Visit# / total visits:      -12   Pain level: \"sore\"/10 R knee       Goals:       Short term goals  Time Frame for Short term goals: 3 weeks, 3/16/18  Short term goal 1: Pt will be able to report no falls at home Not met 3/7  Short term goal 2: Pt will be able to walk 110ft w/o AD w/ good gait mech and looking where he's going Progressing 3/7   Short term goal 3: Pt will be able to tolerate slowly advancing balance exercises w/o increased pain Progressing 3/7   Long term goals  Time Frame for Long term goals : 6 weeks, 18  Long term goal 1: Pt will be independent w/ HEP and able to continue to work on balance on his own after PT    Long term goal 2: Pt will be able to walk >150 ft w/o AD and min pain or fatigue and no LOB    Long term goal 3: Pt will be able to change directions and turn head w/ usual activity and no LOB     Long term goal 4: Pt will have be able to report no falls since starting PT.             Subjective:  Patient states that the knee is a little sore but overall everything is good today. Norman Ang a few days ago again, caught himself on the wall but then fell down.  Wife states that he had a bad morning, just a lot more anxious with more trembling

## 2018-03-09 ENCOUNTER — HOSPITAL ENCOUNTER (OUTPATIENT)
Dept: PHYSICAL THERAPY | Age: 78
Discharge: HOME | End: 2018-03-09
Admitting: ORTHOPAEDIC SURGERY

## 2018-03-14 ENCOUNTER — HOSPITAL ENCOUNTER (OUTPATIENT)
Dept: PHYSICAL THERAPY | Age: 78
Discharge: HOME | End: 2018-03-14
Admitting: ORTHOPAEDIC SURGERY

## 2018-03-14 NOTE — FLOWSHEET NOTE
importance of proper hydration and when eating to ensure he is getting in good nutritious food. Home Exercise Program:  Continue prior to tania    Modality/intervention used:    [x] Therapeutic Exercise  [x] Modalities:  [x] Therapeutic Activity      [] Ultrasound  [] Elec  Stim  [x] Gait Training      [] Cervical Traction [] Lumbar Traction  [x] Neuromuscular Re-education    [x] Cold/hotpack [] Iontophoresis   [x] Instruction in HEP      [] Vasopneumatic     [x] Manual Therapy               [] Aquatic Therapy     Manual Treatments: none     Modalities:             Communication with other providers:  POC faxed 11/14/17, see note 12/26/17, 1/26/18, balance eval 2/23/18    Education provided to patient/caregiver:  none    Adverse reactions to treatment:  none    Equipment provided:  none    Assessment:  Patient tolerated treatment well today despite reports of not feeling well. Did better with some of his balance activities and LE control/coordination than he has in the last couple of sessions. Patient has a lot of other medical issues currently and wife would like to try to get to the bottom of these things before continuing with PT at this point. 0/10 pain after rx. Time In / Time Out: 1300/1348     Timed Code/Total Treatment Minutes:   50' 1 TE 8' 2 NR 40'    Patients Report of Tolerance:    [] Patient limited by fatigue        [x] Patient limited by poor balance/fair endurance/back pain   [] Patient limited by other medical complications   [] Other:     Prognosis:   [x] Good [] Fair  [] Poor    Plan:   [x] Continue per plan of care [] Alter current plan (see comments)  [] Plan of care initiated [] Hold pending MD visit [] Discharge    Plan for Next Session:   Continue to work on static and dynamic balance activity, including head movements.   Look into Parkinson's program prn       Next Progress Note due:   See note 12/26/17, 1/26/18, balance eval 2/23/18         Electronically signed by:  Murali Grimaldo

## 2018-04-01 ENCOUNTER — HOSPITAL ENCOUNTER (OUTPATIENT)
Dept: OTHER | Age: 78
Discharge: HOME | End: 2018-04-10
Attending: ORTHOPAEDIC SURGERY | Admitting: ORTHOPAEDIC SURGERY

## 2018-07-15 LAB
CHOLESTEROL, TOTAL: 121 MG/DL
CHOLESTEROL/HDL RATIO: NORMAL
HDLC SERPL-MCNC: 37 MG/DL (ref 35–70)
LDL CHOLESTEROL CALCULATED: 61 MG/DL (ref 0–160)
TRIGL SERPL-MCNC: 113 MG/DL
VLDLC SERPL CALC-MCNC: 23 MG/DL

## 2019-01-10 LAB
ALBUMIN SERPL-MCNC: NORMAL G/DL
ALP BLD-CCNC: NORMAL U/L
ALT SERPL-CCNC: NORMAL U/L
ANION GAP SERPL CALCULATED.3IONS-SCNC: NORMAL MMOL/L
AST SERPL-CCNC: NORMAL U/L
AVERAGE GLUCOSE: ABNORMAL
BILIRUB SERPL-MCNC: NORMAL MG/DL (ref 0.1–1.4)
BUN BLDV-MCNC: NORMAL MG/DL
CALCIUM SERPL-MCNC: NORMAL MG/DL
CHLORIDE BLD-SCNC: NORMAL MMOL/L
CO2: NORMAL MMOL/L
CREAT SERPL-MCNC: 1.2 MG/DL
GFR CALCULATED: NORMAL
GLUCOSE BLD-MCNC: NORMAL MG/DL
HBA1C MFR BLD: 7.3 %
POTASSIUM SERPL-SCNC: 4.6 MMOL/L
SODIUM BLD-SCNC: NORMAL MMOL/L
TOTAL PROTEIN: NORMAL

## 2019-01-11 ENCOUNTER — HOSPITAL ENCOUNTER (OUTPATIENT)
Dept: PHYSICAL THERAPY | Age: 79
Setting detail: THERAPIES SERIES
Discharge: HOME | End: 2019-01-11
Payer: MEDICARE

## 2019-01-11 PROCEDURE — 97110 THERAPEUTIC EXERCISES: CPT

## 2019-01-11 PROCEDURE — 97162 PT EVAL MOD COMPLEX 30 MIN: CPT

## 2019-01-11 ASSESSMENT — PAIN SCALES - GENERAL: PAINLEVEL_OUTOF10: 5

## 2019-01-15 ENCOUNTER — HOSPITAL ENCOUNTER (OUTPATIENT)
Dept: PHYSICAL THERAPY | Age: 79
Setting detail: THERAPIES SERIES
Discharge: HOME | End: 2019-01-15
Payer: MEDICARE

## 2019-01-15 PROCEDURE — 97113 AQUATIC THERAPY/EXERCISES: CPT

## 2019-01-15 PROCEDURE — 97110 THERAPEUTIC EXERCISES: CPT

## 2019-01-15 PROCEDURE — 97112 NEUROMUSCULAR REEDUCATION: CPT

## 2019-01-18 ENCOUNTER — HOSPITAL ENCOUNTER (OUTPATIENT)
Dept: PHYSICAL THERAPY | Age: 79
Setting detail: THERAPIES SERIES
Discharge: HOME | End: 2019-01-18
Payer: MEDICARE

## 2019-01-18 PROCEDURE — 97116 GAIT TRAINING THERAPY: CPT

## 2019-01-18 PROCEDURE — 97112 NEUROMUSCULAR REEDUCATION: CPT

## 2019-01-18 PROCEDURE — 97110 THERAPEUTIC EXERCISES: CPT

## 2019-01-22 ENCOUNTER — HOSPITAL ENCOUNTER (OUTPATIENT)
Dept: PHYSICAL THERAPY | Age: 79
Setting detail: THERAPIES SERIES
Discharge: HOME | End: 2019-01-22
Payer: MEDICARE

## 2019-01-22 PROCEDURE — 97112 NEUROMUSCULAR REEDUCATION: CPT

## 2019-01-22 PROCEDURE — 97110 THERAPEUTIC EXERCISES: CPT

## 2019-01-29 ENCOUNTER — HOSPITAL ENCOUNTER (OUTPATIENT)
Dept: PHYSICAL THERAPY | Age: 79
Setting detail: THERAPIES SERIES
Discharge: HOME | End: 2019-01-29
Payer: MEDICARE

## 2019-01-29 PROCEDURE — 97110 THERAPEUTIC EXERCISES: CPT

## 2019-01-29 PROCEDURE — 97112 NEUROMUSCULAR REEDUCATION: CPT

## 2019-01-29 PROCEDURE — 97530 THERAPEUTIC ACTIVITIES: CPT

## 2019-01-31 ENCOUNTER — HOSPITAL ENCOUNTER (OUTPATIENT)
Dept: PHYSICAL THERAPY | Age: 79
Setting detail: THERAPIES SERIES
Discharge: HOME | End: 2019-01-31
Payer: MEDICARE

## 2019-01-31 PROCEDURE — 97110 THERAPEUTIC EXERCISES: CPT

## 2019-01-31 PROCEDURE — 97112 NEUROMUSCULAR REEDUCATION: CPT

## 2019-01-31 PROCEDURE — 97530 THERAPEUTIC ACTIVITIES: CPT

## 2019-02-04 ENCOUNTER — HOSPITAL ENCOUNTER (OUTPATIENT)
Dept: PHYSICAL THERAPY | Age: 79
Setting detail: THERAPIES SERIES
Discharge: HOME | End: 2019-02-04
Payer: MEDICARE

## 2019-02-04 PROCEDURE — 97140 MANUAL THERAPY 1/> REGIONS: CPT

## 2019-02-04 PROCEDURE — 97112 NEUROMUSCULAR REEDUCATION: CPT

## 2019-02-04 PROCEDURE — 97110 THERAPEUTIC EXERCISES: CPT

## 2019-02-07 ENCOUNTER — HOSPITAL ENCOUNTER (OUTPATIENT)
Dept: PHYSICAL THERAPY | Age: 79
Setting detail: THERAPIES SERIES
Discharge: HOME | End: 2019-02-07
Payer: MEDICARE

## 2019-02-07 PROCEDURE — 97112 NEUROMUSCULAR REEDUCATION: CPT

## 2019-02-07 PROCEDURE — 97110 THERAPEUTIC EXERCISES: CPT

## 2019-02-07 PROCEDURE — 97116 GAIT TRAINING THERAPY: CPT

## 2019-02-07 PROCEDURE — 97530 THERAPEUTIC ACTIVITIES: CPT

## 2019-02-11 ENCOUNTER — HOSPITAL ENCOUNTER (OUTPATIENT)
Dept: PHYSICAL THERAPY | Age: 79
Setting detail: THERAPIES SERIES
Discharge: HOME | End: 2019-02-11
Payer: MEDICARE

## 2019-02-11 PROCEDURE — 97110 THERAPEUTIC EXERCISES: CPT

## 2019-02-11 PROCEDURE — 97530 THERAPEUTIC ACTIVITIES: CPT

## 2019-02-11 PROCEDURE — 97112 NEUROMUSCULAR REEDUCATION: CPT

## 2019-02-11 PROCEDURE — 97140 MANUAL THERAPY 1/> REGIONS: CPT

## 2019-02-14 ENCOUNTER — HOSPITAL ENCOUNTER (OUTPATIENT)
Dept: PHYSICAL THERAPY | Age: 79
Setting detail: THERAPIES SERIES
Discharge: HOME | End: 2019-02-14
Payer: MEDICARE

## 2019-02-14 PROCEDURE — 97110 THERAPEUTIC EXERCISES: CPT

## 2019-02-14 PROCEDURE — 97112 NEUROMUSCULAR REEDUCATION: CPT

## 2019-02-14 PROCEDURE — 97530 THERAPEUTIC ACTIVITIES: CPT

## 2019-02-18 ENCOUNTER — HOSPITAL ENCOUNTER (OUTPATIENT)
Dept: PHYSICAL THERAPY | Age: 79
Setting detail: THERAPIES SERIES
Discharge: HOME | End: 2019-02-18
Payer: MEDICARE

## 2019-02-18 PROCEDURE — 97140 MANUAL THERAPY 1/> REGIONS: CPT

## 2019-02-18 PROCEDURE — 97110 THERAPEUTIC EXERCISES: CPT

## 2019-02-20 ENCOUNTER — HOSPITAL ENCOUNTER (OUTPATIENT)
Dept: PHYSICAL THERAPY | Age: 79
Setting detail: THERAPIES SERIES
Discharge: HOME | End: 2019-02-20
Payer: MEDICARE

## 2019-02-20 PROCEDURE — 97112 NEUROMUSCULAR REEDUCATION: CPT

## 2019-02-20 PROCEDURE — 97530 THERAPEUTIC ACTIVITIES: CPT

## 2019-03-06 ENCOUNTER — HOSPITAL ENCOUNTER (OUTPATIENT)
Dept: PHYSICAL THERAPY | Age: 79
Setting detail: THERAPIES SERIES
Discharge: HOME | End: 2019-03-06
Payer: MEDICARE

## 2019-03-22 ENCOUNTER — HOSPITAL ENCOUNTER (OUTPATIENT)
Dept: PHYSICAL THERAPY | Age: 79
Setting detail: THERAPIES SERIES
Discharge: HOME | End: 2019-03-22
Payer: MEDICARE

## 2019-03-22 PROCEDURE — 97110 THERAPEUTIC EXERCISES: CPT

## 2019-03-22 PROCEDURE — 97530 THERAPEUTIC ACTIVITIES: CPT

## 2019-03-22 PROCEDURE — 97163 PT EVAL HIGH COMPLEX 45 MIN: CPT

## 2019-03-29 ENCOUNTER — APPOINTMENT (OUTPATIENT)
Dept: OCCUPATIONAL THERAPY | Age: 79
End: 2019-03-29
Payer: MEDICARE

## 2019-03-29 ENCOUNTER — HOSPITAL ENCOUNTER (OUTPATIENT)
Dept: PHYSICAL THERAPY | Age: 79
Setting detail: THERAPIES SERIES
Discharge: HOME | End: 2019-03-29
Payer: MEDICARE

## 2019-03-29 ENCOUNTER — HOSPITAL ENCOUNTER (OUTPATIENT)
Dept: OCCUPATIONAL THERAPY | Age: 79
Setting detail: THERAPIES SERIES
Discharge: HOME | End: 2019-03-29
Payer: MEDICARE

## 2019-03-29 PROCEDURE — 97116 GAIT TRAINING THERAPY: CPT

## 2019-03-29 PROCEDURE — 97166 OT EVAL MOD COMPLEX 45 MIN: CPT

## 2019-03-29 PROCEDURE — 97530 THERAPEUTIC ACTIVITIES: CPT

## 2019-03-29 PROCEDURE — 97112 NEUROMUSCULAR REEDUCATION: CPT

## 2019-04-19 DIAGNOSIS — Z98.890 S/P DILATATION OF ESOPHAGEAL STRICTURE: ICD-10-CM

## 2019-04-19 DIAGNOSIS — Z87.19 S/P DILATATION OF ESOPHAGEAL STRICTURE: ICD-10-CM

## 2019-04-19 DIAGNOSIS — E72.11 HYPERHOMOCYSTEINEMIA (HCC): ICD-10-CM

## 2019-04-19 DIAGNOSIS — J32.9 RECURRENT SINUSITIS: ICD-10-CM

## 2019-04-19 DIAGNOSIS — I27.20 PULMONARY HYPERTENSION (HCC): ICD-10-CM

## 2019-04-19 DIAGNOSIS — Z86.73 HISTORY OF RECURRENT TIAS: ICD-10-CM

## 2019-04-19 RX ORDER — GABAPENTIN 100 MG/1
400 CAPSULE ORAL NIGHTLY
COMMUNITY
End: 2020-01-13 | Stop reason: SDUPTHER

## 2019-04-19 RX ORDER — LANCETS 33 GAUGE
1 EACH MISCELLANEOUS DAILY
COMMUNITY

## 2019-04-19 RX ORDER — ALBUTEROL SULFATE 90 UG/1
2 AEROSOL, METERED RESPIRATORY (INHALATION) EVERY 6 HOURS PRN
COMMUNITY
End: 2020-01-13

## 2019-04-19 RX ORDER — ASPIRIN 325 MG
325 TABLET ORAL EVERY OTHER DAY
COMMUNITY
End: 2019-05-10

## 2019-04-19 RX ORDER — ALBUTEROL SULFATE 2.5 MG/3ML
2.5 SOLUTION RESPIRATORY (INHALATION) 4 TIMES DAILY PRN
COMMUNITY

## 2019-04-22 ENCOUNTER — HOSPITAL ENCOUNTER (OUTPATIENT)
Dept: OCCUPATIONAL THERAPY | Age: 79
Setting detail: THERAPIES SERIES
Discharge: HOME | End: 2019-04-22
Payer: MEDICARE

## 2019-04-22 PROCEDURE — 97110 THERAPEUTIC EXERCISES: CPT

## 2019-04-22 PROCEDURE — 97530 THERAPEUTIC ACTIVITIES: CPT

## 2019-04-22 PROCEDURE — 97112 NEUROMUSCULAR REEDUCATION: CPT

## 2019-04-22 NOTE — PROGRESS NOTES
Signed                                 [x][place] [address]          BRENNAN STEVENSON [facility]     [address] [facility] [address] [place] [address]                                           [facility] 218, [email or web address]                                              [place] Ros[address]     [phone] [phone] [phone]  ________________________________________________________________________  Occupational Therapy Daily Treatment Note     Date:  3/29/2019  Patient Name:  Luisa Dillon                  :  1940  Restrictions/Precautions:  Fall risk  Diagnosis:   PD  Treatment Diagnosis:  weakness  Insurance/Certification information:  Medicare  Referring Physician:   [address] [place] [address] signed (Y/N):    Visit# / total visits:   Pain level:      5/10                       Treatment:  Daily Exercises:   Exercise Reps Effort Rating Comments   Sustained Movements (sitting) Floor to Ceiling, 10 ct 8   Verbal cues, visual    Side to Side, 10 ct, R/L 8  Verbal cues, visual,positioning   Multidirectional Repetitive Movements (standing) Forward step/reach, R/L 10  Verbal cues    Sideways step/reach, R/L 10  Mod verbal cues, visual    Backwards step/reach, R/L 10  Mod verbal cues with sectioning out each move then putting together. Forward/Backward [employer], R/L 10  Mod verbal cues    Side to Side [employer] Reach, R/L 8  Min verbal cues for arms       Maximal Functional Component Movements  Functional Task Repetitions Effort Comments   1. Sit to stand  5 6 Unable to do first couple of tries   2. Walk and turn 5 6 Able to do with concentration   3. Lifting legs and turning like getting in and out of car 5 6 States has difficulty getting in and out of car.    4.       5.           Hierarchy Practice   Minutes Practiced:__________    Effort: __________              BIG Walking  Minutes Practiced: 2__________ Effort:___6_______Walked from front of building to out of door Over 70 ft  Comments:Did well during this attempt. Reported mild fatigue    Complexity Added? Treatment Comments:  Reviewed HEP with patient. Has had ex program at home but has not felt well and has not done    Performance on Daily Tasks? (ie., % modeling/cueing; duration; initiation; fatigue)   Uncued Trials? Cued Trials? Spontaneous \"off the cuff\" BIGNESS? (in between exercises)    Calibration: Do they report self-correction? Self-perception changes? When. Special Concerns: Pain/ROM/ability to follow cues? Motivation? Fatigue? OTHER:       Carryover Assignments: Sit and stand from kitchen chair    Objective Findings:  See eval     Communication with other providers:  fax'd POC to physician     Education provided to patient:     Adverse Reactions to treatment:  none     Timed Code Treatment Minutes:       Total Treatment Minutes:  45     Treatment/Activity Tolerance:           [x]  Patient tolerated treatment well        []  Patient limited by fatique                    []  Patient limited by pain          []  Patient limited by other medical complications          []  Other:      Goals:    Time Frame for Long term goals : 6 weeks  Long term goal 1: Pt. will be able to complete BIG exercises at home with min vc from wife.   Long term goal 2: Pt. will report increase ease in completing ADLs  Long term goal 3: Pt. will decrease TUG time to reduce risk of falls.           Patient Requires Follow-up:             [x]  Yes               []  No     Plan:    []  Continue per plan of care     []  Alter current plan (see comments)     [x]  Plan of care initiated []  Hold pending MD visit           []  Discharge     Plan for Next Session:       Electronically signed by: Jose Bearden [id number]

## 2019-04-24 ENCOUNTER — HOSPITAL ENCOUNTER (OUTPATIENT)
Dept: OCCUPATIONAL THERAPY | Age: 79
Setting detail: THERAPIES SERIES
Discharge: HOME | End: 2019-04-24
Payer: MEDICARE

## 2019-04-24 PROCEDURE — 97110 THERAPEUTIC EXERCISES: CPT

## 2019-04-24 PROCEDURE — 97530 THERAPEUTIC ACTIVITIES: CPT

## 2019-04-24 PROCEDURE — 97112 NEUROMUSCULAR REEDUCATION: CPT

## 2019-04-24 NOTE — PROGRESS NOTES
Signed                                 [x][place] [address]          BRENNAN STEVENSON [facility]     [address] [facility] [address] [place] [address]                                           [facility] 218, [address], [address]                                              [place] [address]     [phone] [phone] [phone]  ________________________________________________________________________  Occupational Therapy Daily Treatment Note     Date:  3/29/2019  Patient Name:  Jorge Alberto Iqbal                  :  1940  Restrictions/Precautions:  Fall risk  Diagnosis:   PD  Treatment Diagnosis:  weakness  Insurance/Certification information:  Medicare  Referring Physician:   [address] [place] [address] signed (Y/N):    Visit# / total visits:   Pain level:      5/10       Subjective: pt. States he will have to cancel one appt. Next week due to doctor apt. Treatment:  Daily Exercises:   Exercise Reps Effort Rating Comments   Sustained Movements (sitting) Floor to Ceiling, 10 ct 8   Verbal cues, visual    Side to Side, 10 ct, R/L 8  Verbal cues, visual,positioning   Multidirectional Repetitive Movements (standing) Forward step/reach, R/L 10  Verbal cues    Sideways step/reach, R/L 10  Mod verbal cues, visual    Backwards step/reach, R/L 10  Mod verbal cues with sectioning out each move then putting together. Forward/Backward [employer], R/L 10  Mod verbal cues    Side to Side [employer]ach, R/L 8  Min verbal cues for arms       Maximal Functional Component Movements  Functional Task Repetitions Effort Comments   1. Sit to stand  5 6 Unable to do first couple of tries   2.walk through obstacle course 5 6 Able to do with concentration   3. Lifting legs and turning like getting in and out of car 5 6 States has difficulty getting in and out of car. 4. Bounce ball in // bars      5.  Floor ladder: forward and side stepping          Hierarchy Practice   Minutes Practiced:__________    Effort: __________              BIG Walking  Big walking x 8 min including stepping over a 6\" curb    Treatment Comments:  Reviewed HEP with patient. Has had ex program at home but has not felt well and has not done    Performance on Daily Tasks? (ie., % modeling/cueing; duration; initiation; fatigue)   Uncued Trials? Cued Trials? Spontaneous \"off the cuff\" BIGNESS? (in between exercises)    Calibration: Do they report self-correction? Self-perception changes? When. Special Concerns: Pain/ROM/ability to follow cues? Motivation? Fatigue? OTHER:       Carryover Assignments: Sit and stand from kitchen chair    Objective Findings:  See eval     Communication with other providers:  fax'd POC to physician     Education provided to patient:     Adverse Reactions to treatment:  none     Timed Code Treatment Minutes:       Total Treatment Minutes:  45     Treatment/Activity Tolerance:           [x]  Patient tolerated treatment well        []  Patient limited by fatique                    []  Patient limited by pain          []  Patient limited by other medical complications          []  Other:      Goals:    Time Frame for Long term goals : 6 weeks  Long term goal 1: Pt. will be able to complete BIG exercises at home with min vc from wife.   Long term goal 2: Pt. will report increase ease in completing ADLs  Long term goal 3: Pt. will decrease TUG time to reduce risk of falls.           Patient Requires Follow-up:             [x]  Yes               []  No     Plan:    [x]  Continue per plan of care     []  Alter current plan (see comments)     []  Plan of care initiated []  Hold pending MD visit           []  Discharge     Plan for Next Session:       Electronically signed by: Pool DIAZ/L

## 2019-04-26 ENCOUNTER — HOSPITAL ENCOUNTER (OUTPATIENT)
Dept: OCCUPATIONAL THERAPY | Age: 79
Setting detail: THERAPIES SERIES
Discharge: HOME | End: 2019-04-26
Payer: MEDICARE

## 2019-04-26 PROCEDURE — 97112 NEUROMUSCULAR REEDUCATION: CPT

## 2019-04-26 PROCEDURE — 97530 THERAPEUTIC ACTIVITIES: CPT

## 2019-04-26 PROCEDURE — 97110 THERAPEUTIC EXERCISES: CPT

## 2019-04-26 NOTE — PROGRESS NOTES
Signed                                 [x][facility]          BRENNAN STEVENSON [address] [place] [address]                                           [facility] [address], [address], [address]                                              [place] [address]     [phone] [phone] [phone]  ________________________________________________________________________  Occupational Therapy Daily Treatment Note     Date:  3/29/2019  Patient Name:  Paula Mota                  :  1940  Restrictions/Precautions:  Fall risk  Diagnosis:   PD  Treatment Diagnosis:  weakness  Insurance/Certification information:  Medicare  Referring Physician:   Isela Dimas  Plan of care signed (Y/N):    Visit# / total visits: 3/16  Pain level:      10       Subjective: pt. States he has been up since 1:00 am with diarrhea               Treatment:  Daily Exercises:   Exercise Reps Effort Rating Comments   Sustained Movements (sitting) Floor to Ceiling, 10 ct 8   Verbal cues, visual    Side to Side, 10 ct, R/L 8  Verbal cues, visual,positioning   Multidirectional Repetitive Movements (standing) Forward step/reach, R/L 10  Verbal cues    Sideways step/reach, R/L 10  Mod verbal cues, visual    Backwards step/reach, R/L 10  Mod verbal cues with sectioning out each move then putting together. Forward/Backward [employer], R/L 10  Mod verbal cues    Side to Side [employer], R/L 8  Min verbal cues for arms       Maximal Functional Component Movements  Functional Task Repetitions Effort Comments   1. Sit to stand  5 6 Unable to do first couple of tries   2.walk through obstacle course 5 6 Able to do with concentration   3. Lifting legs and turning like getting in and out of car 5 6 States has difficulty getting in and out of car. 4. Bounce ball in // bars 20     5.  Floor ladder: forward and side stepping Down and back         Hierarchy Practice   Minutes Practiced:__________    Effort: __________              BIG Walking  Big walking x 8 min , then has to rest due to hip pain    Treatment Comments:      Performance on Daily Tasks? (ie., % modeling/cueing; duration; initiation; fatigue)   Uncued Trials? Cued Trials? Spontaneous \"off the cuff\" BIGNESS? (in between exercises)    Calibration: Do they report self-correction? Self-perception changes? When. Special Concerns: Pain/ROM/ability to follow cues? Motivation? Fatigue? OTHER:       Carryover Assignments: Sit and stand from kitchen chair    Objective Findings:      Communication with other providers:  fax'd POC to physician     Education provided to patient:     Adverse Reactions to treatment:  none     Timed Code Treatment Minutes:  55     Total Treatment Minutes:  55     Treatment/Activity Tolerance:           [x]  Patient tolerated treatment well        []  Patient limited by fatique                    []  Patient limited by pain          []  Patient limited by other medical complications          []  Other:      Goals:    Time Frame for Long term goals : 6 weeks  Long term goal 1: Pt. will be able to complete BIG exercises at home with min vc from wife.   Long term goal 2: Pt. will report increase ease in completing ADLs  Long term goal 3: Pt. will decrease TUG time to reduce risk of falls.           Patient Requires Follow-up:             [x]  Yes               []  No     Plan:    [x]  Continue per plan of care     []  Alter current plan (see comments)     []  Plan of care initiated []  Hold pending MD visit           []  Discharge     Plan for Next Session:       Electronically signed by: Neal DIAZ/BETO

## 2019-04-29 ENCOUNTER — HOSPITAL ENCOUNTER (OUTPATIENT)
Dept: OCCUPATIONAL THERAPY | Age: 79
Discharge: HOME | End: 2019-04-29

## 2019-05-01 ENCOUNTER — HOSPITAL ENCOUNTER (OUTPATIENT)
Dept: OCCUPATIONAL THERAPY | Age: 79
Setting detail: THERAPIES SERIES
Discharge: HOME | End: 2019-05-01
Payer: MEDICARE

## 2019-05-01 PROCEDURE — 97110 THERAPEUTIC EXERCISES: CPT

## 2019-05-01 PROCEDURE — 97530 THERAPEUTIC ACTIVITIES: CPT

## 2019-05-01 PROCEDURE — 97112 NEUROMUSCULAR REEDUCATION: CPT

## 2019-05-01 NOTE — PROGRESS NOTES
and turning like getting in and out of car 5 6 States has difficulty getting in and out of car. 4. Bounce ball in // bars 20     5. Floor ladder: forward and side stepping Down and back         Hierarchy Practice   Minutes Practiced:__________    Effort: __________              BIG Walking  Big walking x 8 min , then has to rest due to hip pain    Treatment Comments:      Performance on Daily Tasks? (ie., % modeling/cueing; duration; initiation; fatigue)   Uncued Trials? Cued Trials? Spontaneous \"off the cuff\" BIGNESS? (in between exercises)    Calibration: Do they report self-correction? Self-perception changes? When. Special Concerns: Pain/ROM/ability to follow cues? Motivation? Fatigue? OTHER:       Carryover Assignments:     Objective Findings:      Communication with other providers:  fax'd POC to physician     Education provided to patient:     Adverse Reactions to treatment:  none     Timed Code Treatment Minutes:  45     Total Treatment Minutes:  45     Treatment/Activity Tolerance:           [x]  Patient tolerated treatment well        []  Patient limited by fatique                    []  Patient limited by pain          []  Patient limited by other medical complications          []  Other:      Goals:    Time Frame for Long term goals : 6 weeks  Long term goal 1: Pt. will be able to complete BIG exercises at home with min vc from wife.   Long term goal 2: Pt. will report increase ease in completing ADLs  Long term goal 3: Pt. will decrease TUG time to reduce risk of falls.           Patient Requires Follow-up:             [x]  Yes               []  No     Plan:    [x]  Continue per plan of care     []  Alter current plan (see comments)     []  Plan of care initiated []  Hold pending MD visit           []  Discharge     Plan for Next Session:       Electronically signed by: Stephan DIAZ/BETO

## 2019-05-03 ENCOUNTER — HOSPITAL ENCOUNTER (OUTPATIENT)
Dept: OCCUPATIONAL THERAPY | Age: 79
Setting detail: THERAPIES SERIES
Discharge: HOME | End: 2019-05-03
Payer: MEDICARE

## 2019-05-03 PROCEDURE — 97530 THERAPEUTIC ACTIVITIES: CPT

## 2019-05-03 PROCEDURE — 97110 THERAPEUTIC EXERCISES: CPT

## 2019-05-03 PROCEDURE — 97112 NEUROMUSCULAR REEDUCATION: CPT

## 2019-05-03 NOTE — PROGRESS NOTES
Signed                                 [x][place] Walker [address]          BRENNAN [facility]     [address] [facility] [address] [place] [address]                                           [facility] [address], [address]                                              [place] [address]     [phone] [phone] [phone]  ________________________________________________________________________  Occupational Therapy Daily Treatment Note     Date:  3/29/2019  Patient Name:  Amanda Christian                  :  1940  Restrictions/Precautions:  Fall risk  Diagnosis:   PD  Treatment Diagnosis:  weakness  Insurance/Certification information:  Medicare  Referring Physician:   Daphne Patton  Plan of care signed (Y/N):    Visit# / total visits:   Pain level:      5/10       Subjective: pt. States all his GI testing was normal but he still is having trouble eating and feels very weak/tired               Treatment:  Daily Exercises:   Exercise Reps Effort Rating Comments   Sustained Movements (sitting) Floor to Ceiling, 10 ct 8   Verbal cues, visual    Side to Side, 10 ct, R/L 8  Verbal cues, visual,positioning   Multidirectional Repetitive Movements (standing) Forward step/reach, R/L 10  Verbal cues  Worked with pt. In // bars so he could use both hands and ex. Unsupported;  Demonstrated fair balance due to a few LOB    Sideways step/reach, R/L 10  Mod verbal cues, visual    Backwards step/reach, R/L 10  Mod verbal cues with sectioning out each move then putting together. Forward/Backward [employer], R/L 10  Mod verbal cues    Side to Side [employer], R/L 8  Min verbal cues for arms       Maximal Functional Component Movements  Functional Task Repetitions Effort Comments   1. Sit to stand  5 6 Unable to do first couple of tries   2.walk through obstacle course 5 6 Able to do with concentration   3.  Lifting legs and turning like getting in and out of car 5 6 States has difficulty getting in and out of car. 4. Bounce ball in // bars 20     5. Floor ladder: forward and side stepping Down and back         Hierarchy Practice   Minutes Practiced:__________    Effort: __________              BIG Walking  Big walking x 4 min , then has to rest due to hip pain     NuStep x 10 min    Treatment Comments:      Performance on Daily Tasks? (ie., % modeling/cueing; duration; initiation; fatigue)   Uncued Trials? Cued Trials? Spontaneous \"off the cuff\" BIGNESS? (in between exercises)    Calibration: Do they report self-correction? Self-perception changes? When. Special Concerns: Pain/ROM/ability to follow cues? Motivation? Fatigue? OTHER:       Carryover Assignments:     Objective Findings:      Communication with other providers:  fax'd POC to physician     Education provided to patient:     Adverse Reactions to treatment:  none     Timed Code Treatment Minutes:  60     Total Treatment Minutes:  60     Treatment/Activity Tolerance:           [x]  Patient tolerated treatment well        []  Patient limited by fatique                    []  Patient limited by pain          []  Patient limited by other medical complications          []  Other:      Goals:    Time Frame for Long term goals : 6 weeks  Long term goal 1: Pt. will be able to complete BIG exercises at home with min vc from wife.   Long term goal 2: Pt. will report increase ease in completing ADLs  Long term goal 3: Pt. will decrease TUG time to reduce risk of falls.           Patient Requires Follow-up:             [x]  Yes               []  No     Plan:    [x]  Continue per plan of care     []  Alter current plan (see comments)     []  Plan of care initiated []  Hold pending MD visit           []  Discharge     Plan for Next Session:       Electronically signed by: Raheel DIAZ/BETO

## 2019-05-08 ENCOUNTER — HOSPITAL ENCOUNTER (OUTPATIENT)
Dept: OCCUPATIONAL THERAPY | Age: 79
Setting detail: THERAPIES SERIES
Discharge: HOME | End: 2019-05-08
Payer: MEDICARE

## 2019-05-08 PROCEDURE — 97110 THERAPEUTIC EXERCISES: CPT

## 2019-05-08 PROCEDURE — 97530 THERAPEUTIC ACTIVITIES: CPT

## 2019-05-08 PROCEDURE — 97112 NEUROMUSCULAR REEDUCATION: CPT

## 2019-05-08 NOTE — PROGRESS NOTES
Signed                                 [x][place] [address]bert[address]onald [address]          BRENNAN [facility]     [address] [facility] [address] [place] [address]                                           [facility] [address], [address], [address]                                              [place] [address]     [phone] [phone] [phone]  ________________________________________________________________________  Occupational Therapy Daily Treatment Note     Date:  3/29/2019  Patient Name:  Paola Deras                  :  1940  Restrictions/Precautions:  Fall risk  Diagnosis:   PD  Treatment Diagnosis:  weakness  Insurance/Certification information:  Medicare  Referring Physician:   Tristen Walton  Plan of care signed (Y/N):    Visit# / total visits:   Pain level:      5/10       Subjective: pt. States he thinks he has an ear problem which might be impacting his balance               Treatment:  Daily Exercises:   Exercise Reps Effort Rating Comments   Sustained Movements (sitting) Floor to Ceiling, 10 ct 8   Verbal cues, visual    Side to Side, 10 ct, R/L 8  Verbal cues, visual,positioning   Multidirectional Repetitive Movements (standing) Forward step/reach, R/L 10  Verbal cues  Worked with pt. In // bars so he could use both hands and ex. Unsupported;  More off balance today with several LOB    Sideways step/reach, R/L 10  Mod verbal cues, visual    Backwards step/reach, R/L 10  Mod verbal cues with sectioning out each move then putting together. Forward/Backward [employer], R/L 10  Mod verbal cues    Side to Side [employer], R/L 8  Min verbal cues for arms       Maximal Functional Component Movements  Functional Task Repetitions Effort Comments   1. Sit to stand  5 6 Unable to do first couple of tries   2.walk through obstacle course 5 6 Able to do with concentration   3.  Lifting legs and turning like getting in and out of car 5 6 States has difficulty getting in and out of car. 4. Bounce ball in // bars 20     5. Floor ladder: forward and side stepping Down and back         Hierarchy Practice   Minutes Practiced:__________    Effort: __________              BIG Walking  Big walking x 6 min , then has to rest due to hip pain     NuStep x 17 min    Treatment Comments:      Performance on Daily Tasks? (ie., % modeling/cueing; duration; initiation; fatigue)   Uncued Trials? Cued Trials? Spontaneous \"off the cuff\" BIGNESS? (in between exercises)    Calibration: Do they report self-correction? Self-perception changes? When. Special Concerns: Pain/ROM/ability to follow cues? Motivation? Fatigue? OTHER:       Carryover Assignments:     Objective Findings:      Communication with other providers:  fax'd POC to physician     Education provided to patient:     Adverse Reactions to treatment:  none     Timed Code Treatment Minutes:  60     Total Treatment Minutes:  60     Treatment/Activity Tolerance:           [x]  Patient tolerated treatment well        []  Patient limited by fatique                    []  Patient limited by pain          []  Patient limited by other medical complications          []  Other:      Goals:    Time Frame for Long term goals : 6 weeks  Long term goal 1: Pt. will be able to complete BIG exercises at home with min vc from wife.   Long term goal 2: Pt. will report increase ease in completing ADLs  Long term goal 3: Pt. will decrease TUG time to reduce risk of falls.           Patient Requires Follow-up:             [x]  Yes               []  No     Plan:    [x]  Continue per plan of care     []  Alter current plan (see comments)     []  Plan of care initiated []  Hold pending MD visit           []  Discharge     Plan for Next Session:       Electronically signed by: Odette Hooks OTR/L

## 2019-05-10 ENCOUNTER — HOSPITAL ENCOUNTER (OUTPATIENT)
Dept: OCCUPATIONAL THERAPY | Age: 79
Setting detail: THERAPIES SERIES
Discharge: HOME | End: 2019-05-10
Payer: MEDICARE

## 2019-05-10 ENCOUNTER — OFFICE VISIT (OUTPATIENT)
Dept: FAMILY MEDICINE CLINIC | Age: 79
End: 2019-05-10
Payer: MEDICARE

## 2019-05-10 VITALS
WEIGHT: 184 LBS | SYSTOLIC BLOOD PRESSURE: 104 MMHG | HEART RATE: 76 BPM | BODY MASS INDEX: 26.34 KG/M2 | DIASTOLIC BLOOD PRESSURE: 66 MMHG | HEIGHT: 70 IN

## 2019-05-10 DIAGNOSIS — E11.9 TYPE 2 DIABETES MELLITUS WITHOUT COMPLICATION, WITHOUT LONG-TERM CURRENT USE OF INSULIN (HCC): ICD-10-CM

## 2019-05-10 DIAGNOSIS — I71.40 AAA (ABDOMINAL AORTIC ANEURYSM) WITHOUT RUPTURE: ICD-10-CM

## 2019-05-10 DIAGNOSIS — I27.20 PULMONARY HYPERTENSION (HCC): Primary | ICD-10-CM

## 2019-05-10 DIAGNOSIS — J44.9 COPD, MILD (HCC): ICD-10-CM

## 2019-05-10 DIAGNOSIS — E11.8 TYPE 2 DIABETES MELLITUS WITH COMPLICATION, WITHOUT LONG-TERM CURRENT USE OF INSULIN (HCC): ICD-10-CM

## 2019-05-10 DIAGNOSIS — K59.00 CONSTIPATION, UNSPECIFIED CONSTIPATION TYPE: ICD-10-CM

## 2019-05-10 DIAGNOSIS — R39.12 WEAK URINARY STREAM: ICD-10-CM

## 2019-05-10 DIAGNOSIS — R11.0 NAUSEA: ICD-10-CM

## 2019-05-10 LAB
CREATININE URINE: 205.7 MG/DL (ref 39–259)
MICROALBUMIN UR-MCNC: 4.3 MG/DL
MICROALBUMIN/CREAT UR-RTO: 20.9 MG/G (ref 0–30)

## 2019-05-10 PROCEDURE — 97530 THERAPEUTIC ACTIVITIES: CPT

## 2019-05-10 PROCEDURE — 97110 THERAPEUTIC EXERCISES: CPT

## 2019-05-10 PROCEDURE — 97112 NEUROMUSCULAR REEDUCATION: CPT

## 2019-05-10 PROCEDURE — 99214 OFFICE O/P EST MOD 30 MIN: CPT | Performed by: FAMILY MEDICINE

## 2019-05-10 RX ORDER — MONTELUKAST SODIUM 10 MG/1
10 TABLET ORAL NIGHTLY
Qty: 90 TABLET | Refills: 1 | Status: SHIPPED | OUTPATIENT
Start: 2019-05-10 | End: 2020-01-13 | Stop reason: SDUPTHER

## 2019-05-10 RX ORDER — NITROGLYCERIN 0.4 MG/1
0.4 TABLET SUBLINGUAL EVERY 5 MIN PRN
Qty: 25 TABLET | Refills: 1 | Status: SHIPPED | OUTPATIENT
Start: 2019-05-10 | End: 2020-11-12 | Stop reason: SDUPTHER

## 2019-05-10 RX ORDER — ATORVASTATIN CALCIUM 80 MG/1
80 TABLET, FILM COATED ORAL DAILY
Qty: 90 TABLET | Refills: 2 | Status: SHIPPED | OUTPATIENT
Start: 2019-05-10 | End: 2020-01-13 | Stop reason: SDUPTHER

## 2019-05-10 RX ORDER — CLOPIDOGREL BISULFATE 75 MG/1
75 TABLET ORAL EVERY OTHER DAY
Qty: 45 TABLET | Refills: 2 | Status: SHIPPED | OUTPATIENT
Start: 2019-05-10 | End: 2020-01-13 | Stop reason: SDUPTHER

## 2019-05-10 RX ORDER — NITROGLYCERIN 0.4 MG/1
0.4 TABLET SUBLINGUAL EVERY 5 MIN PRN
COMMUNITY
End: 2019-05-10

## 2019-05-10 ASSESSMENT — PATIENT HEALTH QUESTIONNAIRE - PHQ9
SUM OF ALL RESPONSES TO PHQ9 QUESTIONS 1 & 2: 0
1. LITTLE INTEREST OR PLEASURE IN DOING THINGS: 0
2. FEELING DOWN, DEPRESSED OR HOPELESS: 0
SUM OF ALL RESPONSES TO PHQ QUESTIONS 1-9: 0
SUM OF ALL RESPONSES TO PHQ QUESTIONS 1-9: 0

## 2019-05-10 ASSESSMENT — ENCOUNTER SYMPTOMS
ABDOMINAL PAIN: 0
COUGH: 0
RHINORRHEA: 0
CONSTIPATION: 1
DIARRHEA: 0
VOMITING: 0
SINUS PRESSURE: 0
CHEST TIGHTNESS: 0
SHORTNESS OF BREATH: 0
NAUSEA: 1
SORE THROAT: 0

## 2019-05-10 NOTE — PROGRESS NOTES
5/10/2019    Dmitry Elko    Chief Complaint   Patient presents with    3 Month Follow-Up    Ear Fullness     - per pt \"right ear diminished hearing\"    Constipation     - pt states taking mirlax bid    Other     - weak urine stream       HPI  History was obtained from patient & his wife. Mara Arreola is a 77 y.o. male who presents today with multiple issues. His wife states he has had multiple testing has been done. She is concerned with a lab that was +HepB antibody.  -Complains of a lot of nausea. Has seen GI, EGD already done and will have f/u studies done next week. -BS log brought today and his numbers have been doing well. Wife states he doesn't have much of an appetite.  -Patient having pain in his right ear. Uses saline spray to flush and Singulair daily, along with loratadine and saline regularly. Venus Hanna REVIEW OF SYMPTOMS    Review of Systems   Constitutional: Positive for activity change (not eating much per wife. ). Negative for chills and fever. HENT: Negative for rhinorrhea, sinus pressure and sore throat. Respiratory: Negative for cough, chest tightness and shortness of breath. Gastrointestinal: Positive for constipation (using miralax 2 caps bid, BM once daily (incomplete)) and nausea. Negative for abdominal pain, diarrhea and vomiting ( has resolved). Genitourinary: Positive for difficulty urinating (weak stream). Negative for frequency. Musculoskeletal: Negative for myalgias.        PAST MEDICAL HISTORY  Past Medical History:   Diagnosis Date    AAA (abdominal aortic aneurysm) without rupture (HCC)     Aneurysm ([facility] [address] 2014    brain - [facility]    Arthritis     CAD (coronary artery disease)     Cerebral aneurysm, nonruptured     Chronic low back pain     COPD (chronic obstructive pulmonary disease) (HCC)     Diabetes mellitus (HCC)     GERD (gastroesophageal reflux disease)     H/O 24 hour EKG monitoring 12/7/2013    sinus rhythm, infreq PVCs    H/O cardiovascular stress test 10/30/14, 8/14/2013    10/30/14 Evidence of mild ischemia in the RCA. EF 64%  8/14/2013 EF58% small amount of ischemia    H/O Doppler ultrasound 09/04/2013    ABDOMINAL AORTIC US-it is noted that the max dimension is 3.24 is slightly higher than the previous one, suggest yearly abd aortic US, however this size is essentially unremarkable and is much less than 5 cm.  H/O Doppler ultrasound 10/27/14    ABD AORTIC US: small aortic aneurysm measuring 2.75cm    H/O echocardiogram 9/3/14, 11/12/2013    9/3/14 EF 60%. Mildly hypertrophic LV. Mild mitral, aortic and tricuspid insufficiencies. 11/12/13 EF55-60% mil MR, mile TR, mild pulm htn    H/O echocardiogram 10/13/15    EF 83% Normal LV systolic function. Sclerotic aortic valve which is nonstenotic. Mod to Severe pulmonary HTN.     H/O exercise stress test 12/1/2014    normal resting ECG, no c/p, ventricular premature beats    Hiatal hernia     History of cardiac monitoring 10/14/15    Event - sinus rhythm    History of methicillin resistant staphylococcus aureus (MRSA)     Carrier of the nose 2012    History of nuclear stress test 09/01/2016    lexiscan-normal,EF64%    History of PFTs 1/9/14    History of recurrent TIAs     Hx of 24 hour EKG monitoring 8/11 08/11=NSR with physiological variation of heart rate, although mostly gloria rhythm; 04/00    Hx of cardiovascular stress test 6/12;6/11 06/12=normal perfusion EF 60%; 06/11=EF 65%; 03/11=EF 55%; 03/09=EF 64%; 09/07=EF 62%; 03/03;04/98;02/94;02/00    Hx of cardiovascular stress test 9/30/2015    lexiscan-normal,EF68%    Hx of Doppler echocardiogram 9/12    carotid 9/12=no sig. plaques seen; 08/05    Hx of Doppler ultrasound 6/08    peripheral doppler 06/08=no sig.  PVD    Hx of Doppler ultrasound 9/30/15    Abdominal aortic aneurysm involving the distal abdomianal aorta with a maximum anterior posterior diameter of 3.03cm    Hx of echocardiogram 6/12;8/11 06/12=normal with EF 60%; =55%; =EF 55%; ; 10/98; 10/97; ;     Hx of fracture of vertebral column 2012    4 vertebra fractures    Hx of tilt table evaluation     marked vasovagal response with a drop of the BP without any increment in his heartrate.  Hx of transesophageal echocardiography (JONN) for monitoring 11/10    11/10=mild aortic regurg., probably atrial septal aneurysm, no other sig. abnormality noted    Hyperhomocysteinemia ([address]     Hyperlipidemia     Hypertension     Laceration of head 2012    6 staples    Neurogenic syncope     Obstructive sleep apnea     Post PTCA 14  LAD stented with 3.5x18 and 3.5x12 resolute. RCA dominant vessel with patent stent in distal segment with mild instent restenosis.   EF 55%    Pulmonary hypertension (HCC)     Recurrent sinusitis     S/P dilatation of esophageal stricture     TIA (transient ischemic attack)     x2    Unspecified cerebral artery occlusion with cerebral infarction     Vertebral fracture        FAMILY HISTORY  Family History   Problem Relation Age of Onset    Arthritis Mother     Diabetes Mother     High Cholesterol Mother     Other Father         Brain aneurysm @37    Asthma Son     Seizures Son        SOCIAL HISTORY  Social History     Socioeconomic History    Marital status:      Spouse name: None    Number of children: 2    Years of education: None    Highest education level: None   Occupational History    None   Social Needs    Financial resource strain: None    Food insecurity:     Worry: None     Inability: None    Transportation needs:     Medical: None     Non-medical: None   Tobacco Use    Smoking status: Former Smoker     Packs/day: 2.00     Years: 31.00     Pack years: 62.00     Start date: 5/10/1943     Last attempt to quit: 1974     Years since quittin.3    Smokeless tobacco: Never Used    Tobacco comment: reviewed 10/13/2015   Substance and Sexual Activity    Alcohol use: No     Alcohol/week: 0.0 oz    Drug use: No    Sexual activity: Yes     Partners: Female     Comment:     Lifestyle    Physical activity:     Days per week: None     Minutes per session: None    Stress: None   Relationships    Social connections:     Talks on phone: None     Gets together: None     Attends Denominational service: None     Active member of club or organization: None     Attends meetings of clubs or organizations: None     Relationship status: None    Intimate partner violence:     Fear of current or ex partner: None     Emotionally abused: None     Physically abused: None     Forced sexual activity: None   Other Topics Concern    None   Social History Narrative    None        SURGICAL HISTORY  Past Surgical History:   Procedure Laterality Date    ABDOMINAL AORTIC ANEURYSM REPAIR  9/23/09    3.6x2.7cm.  APPENDECTOMY      CARPAL TUNNEL RELEASE      right    CARPAL TUNNEL RELEASE Left     COLONOSCOPY      ELBOW SURGERY      left x4    ENDOSCOPY, COLON, DIAGNOSTIC      FOOT SURGERY      left    HERNIA REPAIR      INGUINAL HERNIA REPAIR Right     PTCA  5/11;4/98;3/94    05/11= 3.5x28 taxus stent of RCA and distal RCA with lesion 30-40%to be closely monitored. 04/98=mild LV dysfunction, mild CAD; 03/94    PTCA  11/2014    Stent to the LAD    PTCA      SINUS SURGERY      x4    TOTAL KNEE ARTHROPLASTY Right 10/2017    UPPER GASTROINTESTINAL ENDOSCOPY      twice       CURRENT MEDICATIONS  Current Outpatient Medications   Medication Sig Dispense Refill    nitroGLYCERIN (NITROSTAT) 0.4 MG SL tablet Place 0.4 mg under the tongue every 5 minutes as needed up to max of 3 total doses. If no relief after 1 dose, call 911.  albuterol (PROVENTIL) (2.5 MG/3ML) 0.083% nebulizer solution Take 2.5 mg by nebulization 4 times daily as needed      gabapentin (NEURONTIN) 100 MG capsule Take 400 mg by mouth nightly.        metFORMIN (GLUCOPHAGE) 1000 MG tablet Take 1,000 mg by mouth 2 times daily (with meals)      MUPIROCIN EX Apply topically 2 times daily      carbidopa-levodopa (SINEMET)  MG per tablet Take 1 tablet by mouth 3 times daily      LANCETS MICRO THIN 33G MISC 1 Units by Does not apply route daily      albuterol sulfate  (90 Base) MCG/ACT inhaler Inhale 2 puffs into the lungs every 6 hours as needed      blood glucose test strips (ASCENSIA AUTODISC VI;ONE TOUCH ULTRA TEST VI) strip 1 each by In Vitro route daily as needed As needed.  budesonide-formoterol (SYMBICORT) 80-4.5 MCG/ACT AERO Inhale 2 puffs into the lungs 2 times daily Rinse Mouth after use.  atorvastatin (LIPITOR) 80 MG tablet Take 80 mg by mouth daily      clopidogrel (PLAVIX) 75 MG tablet Take 75 mg by mouth every other day       tiotropium (SPIRIVA RESPIMAT) 2.5 MCG/ACT AERS inhaler Inhale 2 puffs into the lungs 2 times daily       vitamin D (CHOLECALCIFEROL) 1000 UNIT TABS tablet Take 1,000 Units by mouth daily      aspirin 81 MG tablet Take 81 mg by mouth every other day       oxyCODONE HCl (OXY-IR) 10 MG immediate release tablet Take 10 mg by mouth 2 times daily as needed for Pain  .  montelukast (SINGULAIR) 10 MG tablet Take 10 mg by mouth nightly      Loratadine 10 MG CAPS Take 10 mg by mouth daily.  nitroGLYCERIN (NITROSTAT) 0.4 MG SL tablet Place 1 tablet under the tongue every 5 minutes as needed for Chest pain. 25 tablet 11    omeprazole (PRILOSEC) 20 MG capsule Take 20 mg by mouth 2 times daily        No current facility-administered medications for this visit. ALLERGIES  Allergies   Allergen Reactions    Aspirin     Nsaids     Tramadol Hcl      Confusion       PHYSICAL EXAM    /66   Pulse 76   Ht 5' 10\" (1.778 m)   Wt 184 lb (83.5 kg)   BMI 26.40 kg/m²     Physical Exam   Constitutional: He is oriented to person, place, and time. He appears well-developed. HENT:   Head: Normocephalic.    Right Ear: External ear normal.   Left Ear: External ear normal.   Pressure in ears noted, right is worse than the left. Eyes: Conjunctivae and EOM are normal.   Neck: Neck supple. Cardiovascular: Normal rate, regular rhythm and normal heart sounds. Exam reveals no gallop and no friction rub. No murmur heard. AAA followed by cardiology. Pulmonary/Chest: Effort normal and breath sounds normal. No respiratory distress. He has no wheezes. Lungs sounds normal   Abdominal: Soft. Bowel sounds are normal.   Musculoskeletal: Normal range of motion. Neurological: He is alert and oriented to person, place, and time. Skin: Skin is warm and dry. ASSESSMENT & PLAN    1. Pulmonary hypertension ([address]  Patient not short of breath. Patient not exceeding regular use of his rescue inhaler. 2. Constipation, unspecified constipation type  Patient with incomplete evacuation despite 2 Bowls of MiraLAX daily. Patient to have prunes 3 of them twice a day. 2 add both fiber and movement. 3. Weak urinary stream  Presumptive BPH diagnosis. Start Flomax 0.4 mg 1 daily    4. Nausea  Under evaluation by GI. Currently no diagnosis other than mild gastritis    5. Type 2 diabetes mellitus without complication, without long-term current use of insulin (HCC)  -A1C, Micro Albumin today    6. AAA (abdominal aortic aneurysm) without rupture ([address]  -Follows with cardiology patient aware of the need of imaging yearly and it just has not occurred yet. Return in about 4 months (around 9/10/2019). Quality & Risk Score Accuracy    Visit Dx:  I71.4 - AAA (abdominal aortic aneurysm) without rupture (HCC)  Assessment and plan:  Stable based upon symptoms and exam. Continue current treatment plan and follow up at least yearly.   Visit Dx:  J44.9 - COPD, mild ([address]  Assessment and plan:  At goal.  CPM  Visit Dx:  E11.8 - Type 2 diabetes mellitus with complication, without long-term current use of insulin ([address]  Assessment and plan:  Improving based upon symptoms and exam. Continue current treatment plan and follow up at least yearly. Visit Dx:  I27.20 - Moderate to severe pulmonary hypertension (HCC)  Assessment and plan:  Stable based upon symptoms and exam. Continue current treatment plan and follow up at least yearly. Last edited 05/10/19 11:13 EDT by Rita Castellanos MD           Electronically signed by Barbara Beasley MA on 5/10/2019      Scribe Authentication Statement  I, Barbara Beasley, scribed portions of this documentation for and in the presence of Victor Manuel Fuentes MD on 5/10/19 at 10:42 AM.     Cecilia Toledo MD, personally performed the service described in this documentation as scribed by Barbara Beasley MA in my presence and it is both accurate and complete.

## 2019-05-10 NOTE — PROGRESS NOTES
4. Bounce ball in // bars 20     5. Floor ladder: forward and side stepping Down and back         Hierarchy Practice   Minutes Practiced:__________    Effort: __________              BIG Walking  Big walking x 5 min , then has to rest due to hip pain     NuStep x 20 min    Treatment Comments:      Performance on Daily Tasks? (ie., % modeling/cueing; duration; initiation; fatigue)   Uncued Trials? Cued Trials? Spontaneous \"off the cuff\" BIGNESS? (in between exercises)    Calibration: Do they report self-correction? Self-perception changes? When. Special Concerns: Pain/ROM/ability to follow cues? Motivation? Fatigue? OTHER:       Carryover Assignments:     Objective Findings:      Communication with other providers:  fax'd POC to physician     Education provided to patient:     Adverse Reactions to treatment:  none     Timed Code Treatment Minutes:  60     Total Treatment Minutes:  60     Treatment/Activity Tolerance:           [x]  Patient tolerated treatment well        []  Patient limited by fatique                    []  Patient limited by pain          []  Patient limited by other medical complications          []  Other:      Goals:    Time Frame for Long term goals : 6 weeks  Long term goal 1: Pt. will be able to complete BIG exercises at home with min vc from wife.   Long term goal 2: Pt. will report increase ease in completing ADLs  Long term goal 3: Pt. will decrease TUG time to reduce risk of falls.           Patient Requires Follow-up:             [x]  Yes               []  No     Plan:    [x]  Continue per plan of care     []  Alter current plan (see comments)     []  Plan of care initiated []  Hold pending MD visit           []  Discharge     Plan for Next Session:       Electronically signed by: Aishwarya DIAZ/BETO

## 2019-05-11 LAB
ESTIMATED AVERAGE GLUCOSE: 151.3 MG/DL
HBA1C MFR BLD: 6.9 %

## 2019-05-24 ENCOUNTER — HOSPITAL ENCOUNTER (OUTPATIENT)
Dept: OCCUPATIONAL THERAPY | Age: 79
Setting detail: THERAPIES SERIES
Discharge: HOME | End: 2019-05-24
Payer: MEDICARE

## 2019-05-24 PROCEDURE — 97110 THERAPEUTIC EXERCISES: CPT

## 2019-05-24 PROCEDURE — 97530 THERAPEUTIC ACTIVITIES: CPT

## 2019-05-24 PROCEDURE — 97112 NEUROMUSCULAR REEDUCATION: CPT

## 2019-05-24 NOTE — PROGRESS NOTES
turning like getting in and out of car 5 6 States has difficulty getting in and out of car. 4. Bounce ball in // bars 20     5. Floor ladder: forward and side stepping Down and back         Hierarchy Practice   Minutes Practiced:__________    Effort: __________              BIG Walking  Big walking x 5 min , then has to rest due to hip pain     NuStep x15 min    Treatment Comments:      Performance on Daily Tasks? (ie., % modeling/cueing; duration; initiation; fatigue)   Uncued Trials? Cued Trials? Spontaneous \"off the cuff\" BIGNESS? (in between exercises)    Calibration: Do they report self-correction? Self-perception changes? When. Special Concerns: Pain/ROM/ability to follow cues? Motivation? Fatigue? OTHER:       Carryover Assignments:     Objective Findings:      Communication with other providers:  fax'd POC to physician     Education provided to patient:     Adverse Reactions to treatment:  none     Timed Code Treatment Minutes:  60     Total Treatment Minutes:  60     Treatment/Activity Tolerance:           [x]  Patient tolerated treatment well        []  Patient limited by fatique                    []  Patient limited by pain          []  Patient limited by other medical complications          []  Other:      Goals:    Time Frame for Long term goals : 6 weeks  Long term goal 1: Pt. will be able to complete BIG exercises at home with min vc from wife.   Long term goal 2: Pt. will report increase ease in completing ADLs  Long term goal 3: Pt. will decrease TUG time to reduce risk of falls.           Patient Requires Follow-up:             [x]  Yes               []  No     Plan:    [x]  Continue per plan of care     []  Alter current plan (see comments)     []  Plan of care initiated []  Hold pending MD visit           []  Discharge     Plan for Next Session:       Electronically signed by: Keiry DIAZ/BETO

## 2019-05-28 ENCOUNTER — HOSPITAL ENCOUNTER (OUTPATIENT)
Dept: OCCUPATIONAL THERAPY | Age: 79
Setting detail: THERAPIES SERIES
Discharge: HOME | End: 2019-05-28
Payer: MEDICARE

## 2019-05-28 PROCEDURE — 97112 NEUROMUSCULAR REEDUCATION: CPT

## 2019-05-28 PROCEDURE — 97110 THERAPEUTIC EXERCISES: CPT

## 2019-05-28 PROCEDURE — 97530 THERAPEUTIC ACTIVITIES: CPT

## 2019-05-29 ENCOUNTER — HOSPITAL ENCOUNTER (OUTPATIENT)
Dept: OCCUPATIONAL THERAPY | Age: 79
Setting detail: THERAPIES SERIES
Discharge: HOME | End: 2019-05-29
Payer: MEDICARE

## 2019-05-29 PROCEDURE — 97112 NEUROMUSCULAR REEDUCATION: CPT

## 2019-05-29 PROCEDURE — 97530 THERAPEUTIC ACTIVITIES: CPT

## 2019-05-29 PROCEDURE — 97110 THERAPEUTIC EXERCISES: CPT

## 2019-05-29 NOTE — PROGRESS NOTES
Signed                                 [x][address]ildreth Walker [id number]          [place] [facility]     [address] [place] [address]                                           [facility] [address], [address]                                              [place] [address]     [phone] [phone] [phone]  ________________________________________________________________________  Occupational Therapy Daily Treatment Note     Date:  3/29/2019  Patient Name:  Molly Chamberlain                  :  1940  Restrictions/Precautions:  Fall risk  Diagnosis:   PD  Treatment Diagnosis:  weakness  Insurance/Certification information:  Medicare  Referring Physician:   Armando Obrien  Plan of care signed (Y/N):    Visit# / total visits: 10/16  Pain level:      /10       Subjective: Pt states tries to give a 10 effort but feels only gives only an 8-9 with not feeling well. States he feels a little worse each day. Treatment:  Daily Exercises:   Exercise Reps Effort Rating Comments   Sustained Movements (sitting) Floor to Ceiling, 10 ct 8 8-9  Verbal cues, visual    Side to Side, 10 ct, R/L 8 8-9 Verbal cues, visual,positioning   Multidirectional Repetitive Movements (standing) Forward step/reach, R/L 10 8-9 Verbal cues  Worked with pt. In // bars so he could use both hands and ex. Unsupported; Less off balance    Sideways step/reach, R/L 10 8-9 Mod verbal cues, visual    Backwards step/reach, R/L 10 8-9 Min verbal cues    Forward/Backward Rock & Reach, R/L 10 8-9 Mod verbal cues-for reach    Side to Side Rock & Reach, R/L 10 8-9 Min verbal cues for arms       Maximal Functional Component Movements  Functional Task Repetitions Effort Comments   1. Sit to stand  5 8-9 Able to do on first try   2.walk through obstacle course 5 8-9 Able to do   3.  Lifting legs and turning like getting in and out of car 5 8-9 Did well 4. Bounce ball in // bars 20 8-9 Did well bending and reaching without losing balance. 5. Floor ladder: forward and side stepping Down and back 8-9        Hierarchy Practice   Minutes Practiced:__________    Effort: __________              BIG Walking  Big walking x 5.25 min , then has to rest due to hip pain   Verbal cues for swing LUE more  NuStep x15 min    Treatment Comments:      Performance on Daily Tasks? (ie., % modeling/cueing; duration; initiation; fatigue)   Uncued Trials? Cued Trials? Spontaneous \"off the cuff\" BIGNESS? (in between exercises)    Calibration: Do they report self-correction? Self-perception changes? When. Special Concerns: Pain/ROM/ability to follow cues? Motivation? Fatigue? Still fatigues easily from not feeling well. Able to follow cues. Appears motivated    OTHER:       Carryover Assignments:     Objective Findings: increased time Big Walking     Communication with other providers:  fax'd POC to physician     Education provided to patient:     Adverse Reactions to treatment:  none     Timed Code Treatment Minutes:  60     Total Treatment Minutes:  60     Treatment/Activity Tolerance:           [x]  Patient tolerated treatment well        []  Patient limited by fatique                    []  Patient limited by pain          []  Patient limited by other medical complications          []  Other:      Goals:    Time Frame for Long term goals : 6 weeks  Long term goal 1: Pt. will be able to complete BIG exercises at home with min vc from wife.   Long term goal 2: Pt. will report increase ease in completing ADLs  Long term goal 3: Pt. will decrease TUG time to reduce risk of falls.           Patient Requires Follow-up:             [x]  Yes               []  No     Plan:    [x]  Continue per plan of care     []  Alter current plan (see comments)     []  Plan of care initiated []  Hold pending MD visit           []  Discharge     Plan for Next Session:       Electronically signed

## 2019-05-31 ENCOUNTER — HOSPITAL ENCOUNTER (OUTPATIENT)
Dept: OCCUPATIONAL THERAPY | Age: 79
Setting detail: THERAPIES SERIES
Discharge: HOME | End: 2019-05-31
Payer: MEDICARE

## 2019-05-31 PROCEDURE — 97112 NEUROMUSCULAR REEDUCATION: CPT

## 2019-05-31 PROCEDURE — 97110 THERAPEUTIC EXERCISES: CPT

## 2019-05-31 PROCEDURE — 97530 THERAPEUTIC ACTIVITIES: CPT

## 2019-05-31 NOTE — PROGRESS NOTES
Signed                                 [x][place] [phone]          [place] [facility]     [address]                                           [facility] [address], [address], [address]                                              [place], [place] [address]     [phone] [phone] [phone]  ________________________________________________________________________  Occupational Therapy Daily Treatment Note     Date:  3/29/2019  Patient Name:  Teri Tyson                  :  1940  Restrictions/Precautions:  Fall risk  Diagnosis:   PD  Treatment Diagnosis:  weakness  Insurance/Certification information:  Medicare  Referring Physician:   Mariana Bentley  Plan of care signed (Y/N):    Visit# / total visits:   Pain level:      5/10       Subjective: states he was lifting cement blocks yesterday to clean out his pond               Treatment:  Daily Exercises:   Exercise Reps Effort Rating Comments   Sustained Movements (sitting) Floor to Ceiling, 10 ct 8 8-9  Verbal cues, visual    Side to Side, 10 ct, R/L 8 8-9 Verbal cues, visual,positioning   Multidirectional Repetitive Movements (standing) Forward step/reach, R/L 10 8-9 Verbal cues  Worked with pt. In // bars so he could use both hands and ex. Unsupported; Less off balance    Sideways step/reach, R/L 10 8-9 Mod verbal cues, visual    Backwards step/reach, R/L 10 8-9 Min verbal cues    Forward/Backward Rock & Reach, R/L 10 8-9 Mod verbal cues-for reach    Side to Side Rock & Reach, R/L 10 8-9 Min verbal cues for arms       Maximal Functional Component Movements  Functional Task Repetitions Effort Comments   1. Sit to stand  5 8-9 Able to do on first try   2.walk through obstacle course 5 8-9 Able to do   3. Lifting legs and turning like getting in and out of car 5 8-9 Did well   4.  Bounce ball in // bars 20 8-9 Did well bending and reaching without losing balance. 5. Floor ladder: forward and side stepping Down and back 8-9        Hierarchy Practice   Minutes Practiced:__________    Effort: __________              BIG Walking    NuStep x15 min    Treatment Comments:      Performance on Daily Tasks? (ie., % modeling/cueing; duration; initiation; fatigue)   Uncued Trials? Cued Trials? Spontaneous \"off the cuff\" BIGNESS? (in between exercises)    Calibration: Do they report self-correction? Self-perception changes? When. Special Concerns: Pain/ROM/ability to follow cues? Motivation? Fatigue? Still fatigues easily from not feeling well. Able to follow cues. Appears motivated    OTHER:       Carryover Assignments:     Objective Findings: increased time Big Walking     Communication with other providers:  fax'd POC to physician     Education provided to patient:     Adverse Reactions to treatment:  none     Timed Code Treatment Minutes:  45     Total Treatment Minutes: 45     Treatment/Activity Tolerance:           [x]  Patient tolerated treatment well        []  Patient limited by fatique                    []  Patient limited by pain          []  Patient limited by other medical complications          []  Other:      Goals:    Time Frame for Long term goals : 6 weeks  Long term goal 1: Pt. will be able to complete BIG exercises at home with min vc from wife.   Long term goal 2: Pt. will report increase ease in completing ADLs  Long term goal 3: Pt. will decrease TUG time to reduce risk of falls.           Patient Requires Follow-up:             [x]  Yes               []  No     Plan:    [x]  Continue per plan of care     []  Alter current plan (see comments)     []  Plan of care initiated []  Hold pending MD visit           []  Discharge     Plan for Next Session:       Electronically signed by: JOE Billy/BETO

## 2019-06-03 ENCOUNTER — HOSPITAL ENCOUNTER (OUTPATIENT)
Dept: OCCUPATIONAL THERAPY | Age: 79
Setting detail: THERAPIES SERIES
Discharge: HOME | End: 2019-06-03
Payer: MEDICARE

## 2019-06-03 PROCEDURE — 97110 THERAPEUTIC EXERCISES: CPT

## 2019-06-03 PROCEDURE — 97530 THERAPEUTIC ACTIVITIES: CPT

## 2019-06-03 PROCEDURE — 97112 NEUROMUSCULAR REEDUCATION: CPT

## 2019-06-03 NOTE — PROGRESS NOTES
Signed                                 [x]Dillon Walker [address]          [facility] [facility]     [address] [facility] [address] [place] [address]                                           [facility] 218, [address], [address]                                              [place] [address]     [phone] [phone] [phone]  ________________________________________________________________________  Occupational Therapy Daily Treatment Note     Date:  3/29/2019  Patient Name:  Felicita Clark                  :  1940  Restrictions/Precautions:  Fall risk  Diagnosis:   PD  Treatment Diagnosis:  weakness  Insurance/Certification information:  Medicare  Referring Physician:   Antwon Green  Plan of care signed (Y/N):    Visit# / total visits:   Pain level:      /10       Subjective: states he was lifting cement blocks yesterday to clean out his pond               Treatment:  Daily Exercises:   Exercise Reps Effort Rating Comments   Sustained Movements (sitting) Floor to Ceiling, 10 ct 8 8-9  Verbal cues, visual    Side to Side, 10 ct, R/L 8 8-9 Verbal cues, visual,positioning   Multidirectional Repetitive Movements (standing) Forward step/reach, R/L 10 8-9 Verbal cues  Worked with pt. In // bars so he could use both hands and ex. Unsupported; Less off balance    Sideways step/reach, R/L 10 8-9 Mod verbal cues, visual    Backwards step/reach, R/L 10 8-9 Min verbal cues    Forward/Backward Rock & Reach, R/L 10 8-9 Mod verbal cues-for reach    Side to Side Rock & Reach, R/L 10 8-9 Min verbal cues for arms       Maximal Functional Component Movements  Functional Task Repetitions Effort Comments   1. Sit to stand  7 8-9 Able to do on first try   2.walk through obstacle course 5 8-9 Able to do   3. Lifting legs and turning like getting in and out of car 5 8-9 Did well   4.  Bounce ball in // bars 20 8-9 Did well bending and reaching without losing balance. 5. Floor ladder: forward and side stepping Down and back 8-9        Hierarchy Practice   Minutes Practiced:__________    Effort: __________              BIG Walking    Big Walking 4 min 52 sec Cues for arm swings  NuStep x15 min    Treatment Comments:      Performance on Daily Tasks? (ie., % modeling/cueing; duration; initiation; fatigue)   Uncued Trials? Cued Trials? Spontaneous \"off the cuff\" BIGNESS? (in between exercises)    Calibration: Do they report self-correction? Self-perception changes? When. Special Concerns: Pain/ROM/ability to follow cues? Motivation? Fatigue? Still fatigues easily from not feeling well. Able to follow cues. Appears motivated    OTHER:       Carryover Assignments:     Objective Findings: increased time Big Walking     Communication with other providers:  fax'd POC to physician     Education provided to patient:     Adverse Reactions to treatment:  none     Timed Code Treatment Minutes:  45     Total Treatment Minutes: 45     Treatment/Activity Tolerance:           [x]  Patient tolerated treatment well        []  Patient limited by fatique                    []  Patient limited by pain          []  Patient limited by other medical complications          []  Other:      Goals:    Time Frame for Long term goals : 6 weeks  Long term goal 1: Pt. will be able to complete BIG exercises at home with min vc from wife.   Long term goal 2: Pt. will report increase ease in completing ADLs  Long term goal 3: Pt. will decrease TUG time to reduce risk of falls.           Patient Requires Follow-up:             [x]  Yes               []  No     Plan:    [x]  Continue per plan of care     []  Alter current plan (see comments)     []  Plan of care initiated []  Hold pending MD visit           []  Discharge     Plan for Next Session:       Electronically signed by: Nazario Casas OTR/L

## 2019-06-05 ENCOUNTER — HOSPITAL ENCOUNTER (OUTPATIENT)
Dept: OCCUPATIONAL THERAPY | Age: 79
Setting detail: THERAPIES SERIES
Discharge: HOME | End: 2019-06-05
Payer: MEDICARE

## 2019-06-05 PROCEDURE — 97112 NEUROMUSCULAR REEDUCATION: CPT

## 2019-06-05 PROCEDURE — 97110 THERAPEUTIC EXERCISES: CPT

## 2019-06-05 PROCEDURE — 97530 THERAPEUTIC ACTIVITIES: CPT

## 2019-06-05 NOTE — PROGRESS NOTES
Signed                                 [x][place] Walker He[address]ale[address]          [facility] [facility]     [address] [facility] [address] [place] [address]                                           [facility], [address], [address]                                              [place] [address]     [phone] [phone] [phone]  ________________________________________________________________________  Occupational Therapy Daily Treatment Note     Date:  3/29/2019  Patient Name:  Mariel Peoples                  :  1940  Restrictions/Precautions:  Fall risk  Diagnosis:   PD  Treatment Diagnosis:  weakness  Insurance/Certification information:  Medicare  Referring Physician:   [address] [place] [address] signed (Y/N):    Visit# / total visits:   Pain level:      5/10       Subjective: states he is seeing his GI doctor today again because he is still experiencing problems               Treatment:  Daily Exercises:   Exercise Reps Effort Rating Comments   Sustained Movements (sitting) Floor to Ceiling, 10 ct 8 8-9  no cues needed    Side to Side, 10 ct, R/L 8 8-9 No cues   Multidirectional Repetitive Movements (standing) Forward step/reach, R/L 10 8-9 Performed ex. In // bars , but pt. Did not need to steady himself    Sideways step/reach, R/L 10 8-9 No cues for standing ex. Backwards step/reach, R/L 10 8-9     Forward/Backward Rock & Reach, R/L 10 8-9     Side to Side Rock & Reach, R/L 10 8-9        Maximal Functional Component Movements  Functional Task Repetitions Effort Comments   1. Sit to stand  7 8-9 Able to do on first try   2.walk through obstacle course 5 8-9 Able to do   3. Lifting legs and turning like getting in and out of car 5 8-9 Did well   4. Bounce ball in // bars 20 8-9 Did well bending and reaching without losing balance.    5. Floor ladder: forward and side stepping Down and back 8-9        Hierarchy Practice   Minutes Practiced:__________    Effort: __________              BIG Walking    Big Walking 7 min 45 sec Cues for arm swings      Treatment Comments:      Performance on Daily Tasks? (ie., % modeling/cueing; duration; initiation; fatigue)   Uncued Trials? Cued Trials? Spontaneous \"off the cuff\" BIGNESS? (in between exercises)    Calibration: Do they report self-correction? Self-perception changes? When. Special Concerns: Pain/ROM/ability to follow cues? Motivation? Fatigue? Still fatigues easily from not feeling well. Able to follow cues. Appears motivated    OTHER:       Carryover Assignments:     Objective Findings: increased time Big Walking     Communication with other providers:  fax'd POC to physician     Education provided to patient:     Adverse Reactions to treatment:  none     Timed Code Treatment Minutes:  45     Total Treatment Minutes: 45     Treatment/Activity Tolerance:           [x]  Patient tolerated treatment well        []  Patient limited by fatique                    []  Patient limited by pain          []  Patient limited by other medical complications          []  Other:      Goals:    Time Frame for Long term goals : 6 weeks  Long term goal 1: Pt. will be able to complete BIG exercises at home with min vc from wife.   Long term goal 2: Pt. will report increase ease in completing ADLs  Long term goal 3: Pt. will decrease TUG time to reduce risk of falls.           Patient Requires Follow-up:             [x]  Yes               []  No     Plan:    [x]  Continue per plan of care     []  Alter current plan (see comments)     []  Plan of care initiated []  Hold pending MD visit           []  Discharge     Plan for Next Session:       Electronically signed by: Nikolas DIAZ/BETO

## 2019-06-10 ENCOUNTER — HOSPITAL ENCOUNTER (OUTPATIENT)
Dept: OCCUPATIONAL THERAPY | Age: 79
Setting detail: THERAPIES SERIES
Discharge: HOME | End: 2019-06-10
Payer: MEDICARE

## 2019-06-10 PROCEDURE — 97110 THERAPEUTIC EXERCISES: CPT

## 2019-06-10 PROCEDURE — 97530 THERAPEUTIC ACTIVITIES: CPT

## 2019-06-10 PROCEDURE — 97112 NEUROMUSCULAR REEDUCATION: CPT

## 2019-06-10 NOTE — PROGRESS NOTES
Signed                                 [x][place] a[address]lker [address]outaleida [address]          [place] [facility]     [address] [facility] [address] [place] [address]                                           [facility], [address]                                              [place], [address]     [phone] [phone] [phone]  ________________________________________________________________________  Occupational Therapy Daily Treatment Note     Date:  3/29/2019  Patient Name:  Kristin Young                  :  1940  Restrictions/Precautions:  Fall risk  Diagnosis:   PD  Treatment Diagnosis:  weakness  Insurance/Certification information:  Medicare  Referring Physician:   [address] [place] [address] signed (Y/N):    Visit# / total visits:   Pain level:      5/10       Subjective: States feels better but still not real good. Back and knee hurting today. Treatment:  Daily Exercises:   Exercise Reps Effort Rating Comments   Sustained Movements (sitting) Floor to Ceiling, 10 ct 8 8-9  no cues needed    Side to Side, 10 ct, R/L 8 8-9 No cues   Multidirectional Repetitive Movements (standing) Forward step/reach, R/L 10 8-9 Performed ex. In // bars , but pt. Did not need to steady himself    Sideways step/reach, R/L 10 8-9 No cues for standing ex. Self corrected    Backwards step/reach, R/L 10 8-9 Cues-verbal and visual, 2 small LOB    Forward/Backward Rock & Reach, R/L 10 8-9     Side to Side Rock & Reach, R/L 10 8-9 Cues for arms     No rests in bwetween  Maximal Functional Component Movements  Functional Task Repetitions Effort Comments   1. Sit to stand  7 8-9 Able to do on first try   2.walk through obstacle course 5 8-9 Able to do   3. Lifting legs and turning like getting in and out of car 6 8-9 Did well   4. Bounce ball in // bars 20 8-9 Did well bending and reaching without losing balance.    5. Floor ladder: forward and side stepping Down and back and side step 8-9        Hierarchy Practice   Minutes Practiced:__________    Effort: __________              BIG Walking    Big Walking 5 min and 15 sec Cues for arm swings  Nu step x 16 min      Treatment Comments:      Performance on Daily Tasks? (ie., % modeling/cueing; duration; initiation; fatigue)   Uncued Trials? Cued Trials? Spontaneous \"off the cuff\" BIGNESS? (in between exercises)    Calibration: Do they report self-correction? Self-perception changes? When. Pt self corrected on side step    Special Concerns: Pain/ROM/ability to follow cues? Motivation? Fatigue? Still fatigues easily from not feeling well. Able to follow cues. Appears motivated    OTHER:       Carryover Assignments:     Objective Findings: increased time Big Walking     Communication with other providers:  fax'd POC to physician     Education provided to patient:     Adverse Reactions to treatment:  none     Timed Code Treatment Minutes:  55     Total Treatment Minutes: 55     Treatment/Activity Tolerance:           [x]  Patient tolerated treatment well        []  Patient limited by fatique                    []  Patient limited by pain          []  Patient limited by other medical complications          []  Other:      Goals:    Time Frame for Long term goals : 6 weeks  Long term goal 1: Pt. will be able to complete BIG exercises at home with min vc from wife.   Long term goal 2: Pt. will report increase ease in completing ADLs  Long term goal 3: Pt. will decrease TUG time to reduce risk of falls.           Patient Requires Follow-up:             [x]  Yes               []  No     Plan:    [x]  Continue per plan of care     []  Alter current plan (see comments)     []  Plan of care initiated []  Hold pending MD visit           []  Discharge     Plan for Next Session:       Electronically signed by: Charity eMdrano

## 2019-06-12 ENCOUNTER — HOSPITAL ENCOUNTER (OUTPATIENT)
Dept: OCCUPATIONAL THERAPY | Age: 79
Setting detail: THERAPIES SERIES
Discharge: HOME | End: 2019-06-12
Payer: MEDICARE

## 2019-06-12 PROCEDURE — 97530 THERAPEUTIC ACTIVITIES: CPT

## 2019-06-12 PROCEDURE — 97112 NEUROMUSCULAR REEDUCATION: CPT

## 2019-06-12 PROCEDURE — 97110 THERAPEUTIC EXERCISES: CPT

## 2019-06-12 NOTE — PROGRESS NOTES
Signed                                 [x][place] Walker Hebert[address]aleida [address]onald [address]          [facility] [facility]     [address] [facility] [address] [place] [address]                                           [facility], [address], [address]                                              [place] [address]     [phone] [phone] [phone]  ________________________________________________________________________  Occupational Therapy Daily Treatment Note     Date:  3/29/2019  Patient Name:  Amanda Christian                  :  1940  Restrictions/Precautions:  Fall risk  Diagnosis:   PD  Treatment Diagnosis:  weakness  Insurance/Certification information:  Medicare  Referring Physician:   [address] [place] [address] signed (Y/N):    Visit# / total visits:   Pain level:      5/10       Subjective: states has been doing doctor recommended eating plan but digestion is still slow               Treatment:  Daily Exercises:   Exercise Reps Effort Rating Comments   Sustained Movements (sitting) Floor to Ceiling, 10 ct 8 8-9  no cues needed    Side to Side, 10 ct, R/L 8 8-9 No cues   Multidirectional Repetitive Movements (standing) Forward step/reach, R/L 10 8-9 Performed ex. In // bars , but pt. Did not need to steady himself    Sideways step/reach, R/L 10 8-9 No cues for standing ex. Self corrected    Backwards step/reach, R/L 10 8-9 Cues-verbal and visual, 2 small LOB    Forward/Backward Rock & Reach, R/L 10 8-9     Side to Side Rock & Reach, R/L 10 8-9 Cues for arms     No rests in bwetween  Maximal Functional Component Movements  Functional Task Repetitions Effort Comments   1. Sit to stand  7 8-9 Able to do on first try   2.walk through obstacle course 5 8-9 Able to do   3. Lifting legs and turning like getting in and out of car 6 8-9 Did well   4. Bounce ball  20 8-9 Did well bending and reaching without losing balance.    5. Floor ladder: forward and side stepping Down and back and side step 8-9        Hierarchy Practice   Minutes Practiced:__________    Effort: __________              BIG Walking    Big Walking 5 min and 15 sec Cues for arm swings  Nu step x 16 min      Treatment Comments:      Performance on Daily Tasks? (ie., % modeling/cueing; duration; initiation; fatigue)   Uncued Trials? Cued Trials? Spontaneous \"off the cuff\" BIGNESS? (in between exercises)    Calibration: Do they report self-correction? Self-perception changes? When. Pt self corrected on side step    Special Concerns: Pain/ROM/ability to follow cues? Motivation? Fatigue? Still fatigues easily from not feeling well. Able to follow cues. Appears motivated    OTHER:       Carryover Assignments:     Objective Findings: increased time Big Walking     Communication with other providers:  fax'd POC to physician     Education provided to patient:     Adverse Reactions to treatment:  none     Timed Code Treatment Minutes:  55     Total Treatment Minutes: 55     Treatment/Activity Tolerance:           [x]  Patient tolerated treatment well        []  Patient limited by fatique                    []  Patient limited by pain          []  Patient limited by other medical complications          []  Other:      Goals:    Time Frame for Long term goals : 6 weeks  Long term goal 1: Pt. will be able to complete BIG exercises at home with min vc from wife.   Long term goal 2: Pt. will report increase ease in completing ADLs  Long term goal 3: Pt. will decrease TUG time to reduce risk of falls.           Patient Requires Follow-up:             [x]  Yes               []  No     Plan:    [x]  Continue per plan of care     []  Alter current plan (see comments)     []  Plan of care initiated []  Hold pending MD visit           []  Discharge     Plan for Next Session:       Electronically signed by: Kiersten DIAZ/BETO

## 2019-06-14 ENCOUNTER — HOSPITAL ENCOUNTER (OUTPATIENT)
Dept: OCCUPATIONAL THERAPY | Age: 79
Setting detail: THERAPIES SERIES
Discharge: HOME | End: 2019-06-14
Payer: MEDICARE

## 2019-06-14 PROCEDURE — 97112 NEUROMUSCULAR REEDUCATION: CPT

## 2019-06-14 PROCEDURE — 97530 THERAPEUTIC ACTIVITIES: CPT

## 2019-06-14 PROCEDURE — 97110 THERAPEUTIC EXERCISES: CPT

## 2019-09-06 ENCOUNTER — OFFICE VISIT (OUTPATIENT)
Dept: FAMILY MEDICINE CLINIC | Age: 79
End: 2019-09-06
Payer: MEDICARE

## 2019-09-06 VITALS
SYSTOLIC BLOOD PRESSURE: 122 MMHG | HEART RATE: 56 BPM | WEIGHT: 168.6 LBS | BODY MASS INDEX: 24.14 KG/M2 | HEIGHT: 70 IN | DIASTOLIC BLOOD PRESSURE: 80 MMHG

## 2019-09-06 DIAGNOSIS — E78.2 MIXED HYPERLIPIDEMIA: ICD-10-CM

## 2019-09-06 DIAGNOSIS — E11.9 TYPE 2 DIABETES MELLITUS WITHOUT COMPLICATION, WITHOUT LONG-TERM CURRENT USE OF INSULIN (HCC): Primary | ICD-10-CM

## 2019-09-06 DIAGNOSIS — I95.1 ORTHOSTATIC HYPOTENSION: ICD-10-CM

## 2019-09-06 DIAGNOSIS — K21.9 GASTROESOPHAGEAL REFLUX DISEASE WITHOUT ESOPHAGITIS: ICD-10-CM

## 2019-09-06 DIAGNOSIS — R63.4 WEIGHT LOSS: ICD-10-CM

## 2019-09-06 DIAGNOSIS — G20 PARKINSON DISEASE (HCC): ICD-10-CM

## 2019-09-06 DIAGNOSIS — E11.9 TYPE 2 DIABETES MELLITUS WITHOUT COMPLICATION, WITHOUT LONG-TERM CURRENT USE OF INSULIN (HCC): ICD-10-CM

## 2019-09-06 DIAGNOSIS — K30 DELAYED GASTRIC EMPTYING: ICD-10-CM

## 2019-09-06 PROBLEM — G20.A1 PARKINSON DISEASE: Status: ACTIVE | Noted: 2019-09-06

## 2019-09-06 PROBLEM — I10 HYPERTENSION: Status: ACTIVE | Noted: 2019-09-06

## 2019-09-06 PROBLEM — I10 ORTHOSTATIC HYPERTENSION: Status: ACTIVE | Noted: 2019-09-06

## 2019-09-06 LAB
A/G RATIO: 1.4 (ref 1.1–2.2)
ALBUMIN SERPL-MCNC: 4.3 G/DL (ref 3.4–5)
ALP BLD-CCNC: 75 U/L (ref 40–129)
ALT SERPL-CCNC: 8 U/L (ref 10–40)
ANION GAP SERPL CALCULATED.3IONS-SCNC: 17 MMOL/L (ref 3–16)
AST SERPL-CCNC: 15 U/L (ref 15–37)
BILIRUB SERPL-MCNC: 0.9 MG/DL (ref 0–1)
BUN BLDV-MCNC: 14 MG/DL (ref 7–20)
CALCIUM SERPL-MCNC: 9.8 MG/DL (ref 8.3–10.6)
CHLORIDE BLD-SCNC: 99 MMOL/L (ref 99–110)
CHOLESTEROL, TOTAL: 111 MG/DL (ref 0–199)
CO2: 27 MMOL/L (ref 21–32)
CREAT SERPL-MCNC: 0.9 MG/DL (ref 0.8–1.3)
GFR AFRICAN AMERICAN: >60
GFR NON-AFRICAN AMERICAN: >60
GLOBULIN: 3 G/DL
GLUCOSE BLD-MCNC: 101 MG/DL (ref 70–99)
HDLC SERPL-MCNC: 37 MG/DL (ref 40–60)
LDL CHOLESTEROL CALCULATED: 49 MG/DL
POTASSIUM SERPL-SCNC: 4 MMOL/L (ref 3.5–5.1)
SODIUM BLD-SCNC: 143 MMOL/L (ref 136–145)
TOTAL PROTEIN: 7.3 G/DL (ref 6.4–8.2)
TRIGL SERPL-MCNC: 123 MG/DL (ref 0–150)
VLDLC SERPL CALC-MCNC: 25 MG/DL

## 2019-09-06 PROCEDURE — G8427 DOCREV CUR MEDS BY ELIG CLIN: HCPCS | Performed by: FAMILY MEDICINE

## 2019-09-06 PROCEDURE — 1123F ACP DISCUSS/DSCN MKR DOCD: CPT | Performed by: FAMILY MEDICINE

## 2019-09-06 PROCEDURE — 4040F PNEUMOC VAC/ADMIN/RCVD: CPT | Performed by: FAMILY MEDICINE

## 2019-09-06 PROCEDURE — G8598 ASA/ANTIPLAT THER USED: HCPCS | Performed by: FAMILY MEDICINE

## 2019-09-06 PROCEDURE — G8420 CALC BMI NORM PARAMETERS: HCPCS | Performed by: FAMILY MEDICINE

## 2019-09-06 PROCEDURE — 99215 OFFICE O/P EST HI 40 MIN: CPT | Performed by: FAMILY MEDICINE

## 2019-09-06 PROCEDURE — 1036F TOBACCO NON-USER: CPT | Performed by: FAMILY MEDICINE

## 2019-09-06 RX ORDER — MIDODRINE HYDROCHLORIDE 2.5 MG/1
2.5 TABLET ORAL 2 TIMES DAILY
COMMUNITY
End: 2020-01-13

## 2019-09-06 RX ORDER — OMEPRAZOLE 20 MG/1
20 CAPSULE, DELAYED RELEASE ORAL 2 TIMES DAILY
Qty: 180 CAPSULE | Refills: 1 | Status: SHIPPED | OUTPATIENT
Start: 2019-09-06 | End: 2020-01-13 | Stop reason: SDUPTHER

## 2019-09-06 ASSESSMENT — ENCOUNTER SYMPTOMS
RESPIRATORY NEGATIVE: 1
ALLERGIC/IMMUNOLOGIC NEGATIVE: 1
EYES NEGATIVE: 1
GASTROINTESTINAL NEGATIVE: 1

## 2019-09-06 NOTE — PROGRESS NOTES
9/6/2019    Hussein Gutierres    Chief Complaint   Patient presents with    Other     4 month f/u       HPI  History was obtained from patient & his wife. Clyde Lovett is a 77 y.o. male who presents today to follow up after 4 months. Patient is fasting today.    -Weight is down 22 lbs since 1/10/19.    -He states he doesn't eat things he used to and a lot of times while wife is cooking he can't stand the smell, it causes him to vomit. He is able to tolerate dairy. He has been diagnosed with Gastroparesis/ delayed gastric emptying since our last visit. He is being treated by a Gastroenterologist.    -He and his wife both state that his memory is starting fade, he has been more forgetful lately. Dr. Kaykay Mccarthy is going to start him on Aricept after his stomach issues are better.    -Blood pressure log brought today & reviewed, averages 150/70 sitting, 120/68 standing. His heart rate doesn't change or increase when he stands. He has been started on Midodrine 2.5 mg daily by Dr. Gabbi Richey Physicians Assistant. Blood sugars are doing very well, log brought and reviewed. Averages 120-130. He is only taking Metformin 1,000mg twice daily.    -Bowels are moving once daily, denies constipation. He does have Miralax at home and he uses that as needed. When he has soft stools he looses control and leaks stools.    -Overall he is doing ok, refills will be sent today. REVIEW OF SYMPTOMS    Review of Systems   Constitutional: Negative. HENT: Negative. Eyes: Negative. Respiratory: Negative. Cardiovascular: Negative. Gastrointestinal: Negative. Endocrine: Negative. Genitourinary: Negative. Musculoskeletal: Negative. Skin: Negative. Allergic/Immunologic: Negative. Neurological: Negative. Hematological: Negative. Psychiatric/Behavioral: Negative.         PAST MEDICAL HISTORY  Past Medical History:   Diagnosis Date    AAA [facility]dominal [facility]) [facility] rupture [facility] [address]     Aneurysm ([facility] [address] [facility] 2014 brain - [facility]    Arthritis     CAD (coronary artery disease)     Cerebral aneurysm, nonruptured     Chronic low back pain     Diabetes mellitus (HCC)     GERD (gastroesophageal reflux disease)     H/O 24 hour EKG monitoring 12/7/2013    sinus rhythm, infreq PVCs    H/O cardiovascular stress test 10/30/14, 8/14/2013    10/30/14 Evidence of mild ischemia in the RCA. EF 64%  8/14/2013 EF58% small amount of ischemia    H/O Doppler ultrasound 09/04/2013    ABDOMINAL AORTIC US-it is noted that the max dimension is 3.24 is slightly higher than the previous one, suggest yearly abd aortic US, however this size is essentially unremarkable and is much less than 5 cm.  H/O Doppler ultrasound 10/27/14    ABD AORTIC US: small aortic aneurysm measuring 2.75cm    H/O echocardiogram 9/3/14, 11/12/2013    9/3/14 EF 60%. Mildly hypertrophic LV. Mild mitral, aortic and tricuspid insufficiencies. 11/12/13 EF55-60% mil MR, mile TR, mild pulm htn    H/O echocardiogram 10/13/15    EF 55% Normal LV systolic function. Sclerotic aortic valve which is nonstenotic.  Mod to Severe pulmonary HTN.     H/O exercise stress test 12/1/2014    normal resting ECG, no c/p, ventricular premature beats    Hiatal hernia     History of cardiac monitoring 10/14/15    Event - sinus rhythm    History of methicillin resistant staphylococcus aureus (MRSA)     Carrier of the nose 2012    History of nuclear stress test 09/01/2016    lexiscan-normal,EF64%    History of PFTs 1/9/14    History of recurrent TIAs     Hx of 24 hour EKG monitoring 8/11 08/11=NSR with physiological variation of heart rate, although mostly gloria rhythm; 04/00    Hx of cardiovascular stress test 6/12;6/11 06/12=normal perfusion EF 60%; 06/11=EF 65%; 03/11=EF 55%; 03/09=EF 64%; 09/07=EF 62%; 03/03;04/98;02/94;02/00    Hx of cardiovascular stress test 9/30/2015    lexiscan-normal,EF68%    Hx of Doppler echocardiogram 9/12    carotid 9/12=no sig. plaques seen; that area. I would bet that the autonomic nervous system has not been greatly damaged by the diabetes. But severely impaired by the Parkinson's. Return in about 4 months (around 1/6/2020). Electronically signed by Miah Preciado on 9/6/2019      Scribe Authentication Statement  Lisset MANCILLA, scribed portions of this documentation for and in the presence of Jamison Bella MD on 9/6/19 at 11:18 AM.     I, Jamison Bella MD, personally performed the service described in this documentation as scribed by Lisset Pruitt MA in my presence and it is both accurate and complete.

## 2019-09-07 LAB
BASOPHILS ABSOLUTE: 0 K/UL (ref 0–0.2)
BASOPHILS RELATIVE PERCENT: 0.6 %
EOSINOPHILS ABSOLUTE: 0.2 K/UL (ref 0–0.6)
EOSINOPHILS RELATIVE PERCENT: 2.5 %
ESTIMATED AVERAGE GLUCOSE: 122.6 MG/DL
HBA1C MFR BLD: 5.9 %
HCT VFR BLD CALC: 40.9 % (ref 40.5–52.5)
HEMOGLOBIN: 13.4 G/DL (ref 13.5–17.5)
LYMPHOCYTES ABSOLUTE: 1.4 K/UL (ref 1–5.1)
LYMPHOCYTES RELATIVE PERCENT: 16.5 %
MCH RBC QN AUTO: 33.9 PG (ref 26–34)
MCHC RBC AUTO-ENTMCNC: 32.8 G/DL (ref 31–36)
MCV RBC AUTO: 103.4 FL (ref 80–100)
MONOCYTES ABSOLUTE: 0.5 K/UL (ref 0–1.3)
MONOCYTES RELATIVE PERCENT: 6 %
NEUTROPHILS ABSOLUTE: 6.1 K/UL (ref 1.7–7.7)
NEUTROPHILS RELATIVE PERCENT: 74.4 %
PDW BLD-RTO: 13.7 % (ref 12.4–15.4)
PLATELET # BLD: 110 K/UL (ref 135–450)
PMV BLD AUTO: 10 FL (ref 5–10.5)
RBC # BLD: 3.96 M/UL (ref 4.2–5.9)
WBC # BLD: 8.2 K/UL (ref 4–11)

## 2019-09-11 ENCOUNTER — TELEPHONE (OUTPATIENT)
Dept: FAMILY MEDICINE CLINIC | Age: 79
End: 2019-09-11

## 2019-09-26 RX ORDER — IPRATROPIUM BROMIDE AND ALBUTEROL SULFATE 2.5; .5 MG/3ML; MG/3ML
SOLUTION RESPIRATORY (INHALATION)
Qty: 360 ML | Refills: 1 | Status: SHIPPED | OUTPATIENT
Start: 2019-09-26 | End: 2019-12-09 | Stop reason: SDUPTHER

## 2019-10-28 ENCOUNTER — OFFICE VISIT (OUTPATIENT)
Dept: FAMILY MEDICINE CLINIC | Age: 79
End: 2019-10-28
Payer: MEDICARE

## 2019-10-28 VITALS
OXYGEN SATURATION: 96 % | SYSTOLIC BLOOD PRESSURE: 110 MMHG | DIASTOLIC BLOOD PRESSURE: 70 MMHG | TEMPERATURE: 98.9 F | HEART RATE: 64 BPM | WEIGHT: 161.4 LBS | HEIGHT: 70 IN | BODY MASS INDEX: 23.11 KG/M2

## 2019-10-28 DIAGNOSIS — J01.90 ACUTE SINUSITIS WITH SYMPTOMS > 10 DAYS: Primary | ICD-10-CM

## 2019-10-28 DIAGNOSIS — R11.2 NAUSEA AND VOMITING, INTRACTABILITY OF VOMITING NOT SPECIFIED, UNSPECIFIED VOMITING TYPE: ICD-10-CM

## 2019-10-28 PROCEDURE — G8420 CALC BMI NORM PARAMETERS: HCPCS | Performed by: PHYSICIAN ASSISTANT

## 2019-10-28 PROCEDURE — G8427 DOCREV CUR MEDS BY ELIG CLIN: HCPCS | Performed by: PHYSICIAN ASSISTANT

## 2019-10-28 PROCEDURE — 4040F PNEUMOC VAC/ADMIN/RCVD: CPT | Performed by: PHYSICIAN ASSISTANT

## 2019-10-28 PROCEDURE — 1123F ACP DISCUSS/DSCN MKR DOCD: CPT | Performed by: PHYSICIAN ASSISTANT

## 2019-10-28 PROCEDURE — 99213 OFFICE O/P EST LOW 20 MIN: CPT | Performed by: PHYSICIAN ASSISTANT

## 2019-10-28 PROCEDURE — G8598 ASA/ANTIPLAT THER USED: HCPCS | Performed by: PHYSICIAN ASSISTANT

## 2019-10-28 PROCEDURE — G8484 FLU IMMUNIZE NO ADMIN: HCPCS | Performed by: PHYSICIAN ASSISTANT

## 2019-10-28 PROCEDURE — 1036F TOBACCO NON-USER: CPT | Performed by: PHYSICIAN ASSISTANT

## 2019-10-28 RX ORDER — DONEPEZIL HYDROCHLORIDE 10 MG/1
10 TABLET, FILM COATED ORAL DAILY
COMMUNITY
Start: 2019-10-17 | End: 2020-01-13 | Stop reason: SDUPTHER

## 2019-10-28 RX ORDER — PROMETHAZINE HYDROCHLORIDE 25 MG/1
25 TABLET ORAL EVERY 8 HOURS PRN
Qty: 30 TABLET | Refills: 1 | Status: SHIPPED | OUTPATIENT
Start: 2019-10-28 | End: 2020-01-13 | Stop reason: SDUPTHER

## 2019-10-28 RX ORDER — AMOXICILLIN AND CLAVULANATE POTASSIUM 875; 125 MG/1; MG/1
1 TABLET, FILM COATED ORAL 2 TIMES DAILY
Qty: 20 TABLET | Refills: 0 | Status: SHIPPED | OUTPATIENT
Start: 2019-10-28 | End: 2019-11-07

## 2019-12-04 ENCOUNTER — TELEPHONE (OUTPATIENT)
Dept: FAMILY MEDICINE CLINIC | Age: 79
End: 2019-12-04

## 2019-12-06 ENCOUNTER — TELEPHONE (OUTPATIENT)
Dept: FAMILY MEDICINE CLINIC | Age: 79
End: 2019-12-06

## 2019-12-09 ENCOUNTER — TELEPHONE (OUTPATIENT)
Dept: FAMILY MEDICINE CLINIC | Age: 79
End: 2019-12-09

## 2019-12-09 RX ORDER — MAGNESIUM HYDROXIDE 1200 MG/15ML
LIQUID ORAL
Qty: 6000 ML | Refills: 5 | OUTPATIENT
Start: 2019-12-09 | End: 2019-12-11 | Stop reason: SDUPTHER

## 2019-12-09 RX ORDER — IPRATROPIUM BROMIDE AND ALBUTEROL SULFATE 2.5; .5 MG/3ML; MG/3ML
SOLUTION RESPIRATORY (INHALATION)
Qty: 360 ML | Refills: 0 | Status: SHIPPED | OUTPATIENT
Start: 2019-12-09 | End: 2020-03-30

## 2019-12-11 RX ORDER — MAGNESIUM HYDROXIDE 1200 MG/15ML
LIQUID ORAL
Qty: 6000 ML | Refills: 5 | Status: SHIPPED | OUTPATIENT
Start: 2019-12-11 | End: 2021-02-01 | Stop reason: SDUPTHER

## 2020-01-13 ENCOUNTER — OFFICE VISIT (OUTPATIENT)
Dept: FAMILY MEDICINE CLINIC | Age: 80
End: 2020-01-13
Payer: MEDICARE

## 2020-01-13 VITALS
SYSTOLIC BLOOD PRESSURE: 132 MMHG | TEMPERATURE: 98.9 F | OXYGEN SATURATION: 98 % | WEIGHT: 154.6 LBS | HEART RATE: 57 BPM | BODY MASS INDEX: 22.13 KG/M2 | HEIGHT: 70 IN | DIASTOLIC BLOOD PRESSURE: 62 MMHG

## 2020-01-13 DIAGNOSIS — K30 DELAYED GASTRIC EMPTYING: ICD-10-CM

## 2020-01-13 DIAGNOSIS — R63.4 WEIGHT LOSS, ABNORMAL: ICD-10-CM

## 2020-01-13 LAB
A/G RATIO: 1.3 (ref 1.1–2.2)
ALBUMIN SERPL-MCNC: 4.4 G/DL (ref 3.4–5)
ALP BLD-CCNC: 65 U/L (ref 40–129)
ALT SERPL-CCNC: <5 U/L (ref 10–40)
AMYLASE: 23 U/L (ref 25–115)
ANION GAP SERPL CALCULATED.3IONS-SCNC: 18 MMOL/L (ref 3–16)
AST SERPL-CCNC: 30 U/L (ref 15–37)
BILIRUB SERPL-MCNC: 1.6 MG/DL (ref 0–1)
BUN BLDV-MCNC: 14 MG/DL (ref 7–20)
CALCIUM SERPL-MCNC: 10.2 MG/DL (ref 8.3–10.6)
CHLORIDE BLD-SCNC: 97 MMOL/L (ref 99–110)
CO2: 24 MMOL/L (ref 21–32)
CREAT SERPL-MCNC: 0.9 MG/DL (ref 0.8–1.3)
GFR AFRICAN AMERICAN: >60
GFR NON-AFRICAN AMERICAN: >60
GLOBULIN: 3.3 G/DL
GLUCOSE BLD-MCNC: 113 MG/DL (ref 70–99)
LIPASE: 12 U/L (ref 13–60)
POTASSIUM SERPL-SCNC: 4.1 MMOL/L (ref 3.5–5.1)
SODIUM BLD-SCNC: 139 MMOL/L (ref 136–145)
TOTAL PROTEIN: 7.7 G/DL (ref 6.4–8.2)
TSH SERPL DL<=0.05 MIU/L-ACNC: 1.76 UIU/ML (ref 0.27–4.2)

## 2020-01-13 PROCEDURE — 99214 OFFICE O/P EST MOD 30 MIN: CPT | Performed by: FAMILY MEDICINE

## 2020-01-13 RX ORDER — DULOXETIN HYDROCHLORIDE 20 MG/1
20 CAPSULE, DELAYED RELEASE ORAL DAILY
COMMUNITY
End: 2021-02-11 | Stop reason: DRUGHIGH

## 2020-01-13 RX ORDER — DONEPEZIL HYDROCHLORIDE 10 MG/1
10 TABLET, FILM COATED ORAL DAILY
Qty: 30 TABLET | Refills: 5 | Status: SHIPPED | OUTPATIENT
Start: 2020-01-13 | End: 2020-11-12 | Stop reason: SDUPTHER

## 2020-01-13 RX ORDER — MONTELUKAST SODIUM 10 MG/1
10 TABLET ORAL NIGHTLY
Qty: 30 TABLET | Refills: 5 | Status: SHIPPED | OUTPATIENT
Start: 2020-01-13 | End: 2020-07-16 | Stop reason: SDUPTHER

## 2020-01-13 RX ORDER — GABAPENTIN 100 MG/1
400 CAPSULE ORAL NIGHTLY
Qty: 120 CAPSULE | Refills: 5 | Status: SHIPPED | OUTPATIENT
Start: 2020-01-13 | End: 2021-02-11 | Stop reason: DRUGHIGH

## 2020-01-13 RX ORDER — OMEPRAZOLE 20 MG/1
20 CAPSULE, DELAYED RELEASE ORAL 2 TIMES DAILY
Qty: 60 CAPSULE | Refills: 5 | Status: SHIPPED | OUTPATIENT
Start: 2020-01-13 | End: 2020-04-09 | Stop reason: SDUPTHER

## 2020-01-13 RX ORDER — PROMETHAZINE HYDROCHLORIDE 25 MG/1
25 TABLET ORAL EVERY 8 HOURS PRN
Qty: 60 TABLET | Refills: 3 | Status: SHIPPED | OUTPATIENT
Start: 2020-01-13 | End: 2020-02-12

## 2020-01-13 RX ORDER — CLOPIDOGREL BISULFATE 75 MG/1
75 TABLET ORAL EVERY OTHER DAY
Qty: 15 TABLET | Refills: 5 | Status: SHIPPED | OUTPATIENT
Start: 2020-01-13 | End: 2020-04-09 | Stop reason: SDUPTHER

## 2020-01-13 RX ORDER — ATORVASTATIN CALCIUM 80 MG/1
80 TABLET, FILM COATED ORAL DAILY
Qty: 30 TABLET | Refills: 5 | Status: SHIPPED | OUTPATIENT
Start: 2020-01-13 | End: 2020-02-24

## 2020-01-13 ASSESSMENT — ENCOUNTER SYMPTOMS
RHINORRHEA: 0
CHEST TIGHTNESS: 0
COUGH: 0
ABDOMINAL PAIN: 0
CONSTIPATION: 0
SINUS PRESSURE: 0
SORE THROAT: 0
DIARRHEA: 0
SHORTNESS OF BREATH: 0

## 2020-01-13 ASSESSMENT — PATIENT HEALTH QUESTIONNAIRE - PHQ9
SUM OF ALL RESPONSES TO PHQ QUESTIONS 1-9: 1
SUM OF ALL RESPONSES TO PHQ9 QUESTIONS 1 & 2: 1
1. LITTLE INTEREST OR PLEASURE IN DOING THINGS: 0
SUM OF ALL RESPONSES TO PHQ QUESTIONS 1-9: 1
2. FEELING DOWN, DEPRESSED OR HOPELESS: 1

## 2020-01-14 LAB
BASOPHILS ABSOLUTE: 0 K/UL (ref 0–0.2)
BASOPHILS RELATIVE PERCENT: 0.6 %
EOSINOPHILS ABSOLUTE: 0.2 K/UL (ref 0–0.6)
EOSINOPHILS RELATIVE PERCENT: 2 %
HCT VFR BLD CALC: 42.1 % (ref 40.5–52.5)
HEMOGLOBIN: 14.2 G/DL (ref 13.5–17.5)
LYMPHOCYTES ABSOLUTE: 1.5 K/UL (ref 1–5.1)
LYMPHOCYTES RELATIVE PERCENT: 20.2 %
MCH RBC QN AUTO: 32.4 PG (ref 26–34)
MCHC RBC AUTO-ENTMCNC: 33.7 G/DL (ref 31–36)
MCV RBC AUTO: 96.1 FL (ref 80–100)
MONOCYTES ABSOLUTE: 0.5 K/UL (ref 0–1.3)
MONOCYTES RELATIVE PERCENT: 7 %
NEUTROPHILS ABSOLUTE: 5.2 K/UL (ref 1.7–7.7)
NEUTROPHILS RELATIVE PERCENT: 70.2 %
PDW BLD-RTO: 12.8 % (ref 12.4–15.4)
PLATELET # BLD: ABNORMAL K/UL (ref 135–450)
PLATELET SLIDE REVIEW: ABNORMAL
PMV BLD AUTO: 11.4 FL (ref 5–10.5)
RBC # BLD: 4.38 M/UL (ref 4.2–5.9)
RBC # BLD: NORMAL 10*6/UL
WBC # BLD: 7.5 K/UL (ref 4–11)

## 2020-01-14 NOTE — PROGRESS NOTES
1/13/2020    Kari Diaz    Chief Complaint   Patient presents with    Other     4 mth f/u    Other     pt states \"feeling mentally foggy\"    Abdominal Pain     loss appetite    Insomnia     4 to 5 days    Sinus Problem     post nasal drip, headaches chronic       HPI  Agustina Maldonado is a [de-identified] y.o. male who presents today for follow up:    Patient's main symptom today is weight loss. Its continued and long-term. Wife and patient question whether his hepatitis is come back as his nausea can become intense. Patient has been treated for gastroparesis for a long time. It now is becoming more evident that it most likely is related to Parkinson's as patient has now pretty severe orthostasis also. Oddly though he denies constipation and his exam is negative for it also. Patient notes of centralized abdominal pain. It does not appear to be related with consumption of food    Patient is done better with his orthostasis. His neurologist is asking his blood pressure to be allowed to be in the 150s and patient is intolerant to Midrin as his blood pressure skyrocketed once recently on it. Patient wife states his diabetes is doing well his morning blood sugars average 110. He checks it every morning. He denies hypoglycemic symptoms. Patient has not had any falls    REVIEW OF SYMPTOMS  Review of Systems   Constitutional: Negative for chills and fever. HENT: Negative for rhinorrhea, sinus pressure and sore throat. Respiratory: Negative for cough, chest tightness and shortness of breath. Gastrointestinal: Negative for abdominal pain, constipation and diarrhea. Genitourinary: Negative for dysuria and frequency. Musculoskeletal: Negative for myalgias.        PAST MEDICAL HISTORY  Past Medical History:   Diagnosis Date    AAA (abdominal aortic aneurysm) without rupture (HCC)     Aneurysm ([facility] [address] 2014    brain - [facility]    Arthritis     CAD (coronary artery disease)     Cerebral aneurysm, nonruptured     Chronic low back pain     Diabetes mellitus (HCC)     GERD (gastroesophageal reflux disease)     H/O 24 hour EKG monitoring 12/7/2013    sinus rhythm, infreq PVCs    H/O cardiovascular stress test 10/30/14, 8/14/2013    10/30/14 Evidence of mild ischemia in the RCA. EF 64%  8/14/2013 EF58% small amount of ischemia    H/O Doppler ultrasound 09/04/2013    ABDOMINAL AORTIC US-it is noted that the max dimension is 3.24 is slightly higher than the previous one, suggest yearly abd aortic US, however this size is essentially unremarkable and is much less than 5 cm.  H/O Doppler ultrasound 10/27/14    ABD AORTIC US: small aortic aneurysm measuring 2.75cm    H/O echocardiogram 9/3/14, 11/12/2013    9/3/14 EF 60%. Mildly hypertrophic LV. Mild mitral, aortic and tricuspid insufficiencies. 11/12/13 EF55-60% mil MR, mile TR, mild pulm htn    H/O echocardiogram 10/13/15    EF 14% Normal LV systolic function. Sclerotic aortic valve which is nonstenotic. Mod to Severe pulmonary HTN.     H/O exercise stress test 12/1/2014    normal resting ECG, no c/p, ventricular premature beats    Hiatal hernia     History of cardiac monitoring 10/14/15    Event - sinus rhythm    History of methicillin resistant staphylococcus aureus (MRSA)     Carrier of the nose 2012    History of nuclear stress test 09/01/2016    lexiscan-normal,EF64%    History of PFTs 1/9/14    History of recurrent TIAs     Hx of 24 hour EKG monitoring 8/11 08/11=NSR with physiological variation of heart rate, although mostly gloria rhythm; 04/00    Hx of cardiovascular stress test 6/12;6/11 06/12=normal perfusion EF 60%; 06/11=EF 65%; 03/11=EF 55%; 03/09=EF 64%; 09/07=EF 62%; 03/03;04/98;02/94;02/00    Hx of cardiovascular stress test 9/30/2015    lexiscan-normal,EF68%    Hx of Doppler echocardiogram 9/12    carotid 9/12=no sig. plaques seen; 08/05    Hx of Doppler ultrasound 6/08    peripheral doppler 06/08=no sig.  PVD    Hx of Doppler other day 15 tablet 5    montelukast (SINGULAIR) 10 MG tablet Take 1 tablet by mouth nightly 30 tablet 5    atorvastatin (LIPITOR) 80 MG tablet Take 1 tablet by mouth daily 30 tablet 5    gabapentin (NEURONTIN) 100 MG capsule Take 4 capsules by mouth nightly for 30 days. 120 capsule 5    omeprazole (PRILOSEC) 20 MG delayed release capsule Take 1 capsule by mouth 2 times daily 60 capsule 5    promethazine (PHENERGAN) 25 MG tablet Take 1 tablet by mouth every 8 hours as needed for Nausea 60 tablet 3    donepezil (ARICEPT) 10 MG tablet Take 1 tablet by mouth daily 30 tablet 5    sodium chloride 0.9 % irrigation RINSE SINUSES TWO TIMES A DAY AFTER MIXING 1 TUBE OF BACTROBAN OINTMENT INTO BOTTLE OF SOLUTION 6000 mL 5    ipratropium-albuterol (DUONEB) 0.5-2.5 (3) MG/3ML SOLN nebulizer solution USE 1 VIAL PER NEBULIZER FOUR TIMES A DAY AS NEEDED. 360 mL 0    nitroGLYCERIN (NITROSTAT) 0.4 MG SL tablet Place 1 tablet under the tongue every 5 minutes as needed for Chest pain 25 tablet 1    albuterol (PROVENTIL) (2.5 MG/3ML) 0.083% nebulizer solution Take 2.5 mg by nebulization 4 times daily as needed      carbidopa-levodopa (SINEMET)  MG per tablet Take 1 tablet by mouth 3 times daily      LANCETS MICRO THIN 33G MISC 1 Units by Does not apply route daily      blood glucose test strips (ASCENSIA AUTODISC VI;ONE TOUCH ULTRA TEST VI) strip 1 each by In Vitro route daily as needed As needed.  budesonide-formoterol (SYMBICORT) 80-4.5 MCG/ACT AERO Inhale 2 puffs into the lungs 2 times daily Rinse Mouth after use.  tiotropium (SPIRIVA RESPIMAT) 2.5 MCG/ACT AERS inhaler Inhale 2 puffs into the lungs 2 times daily       vitamin D (CHOLECALCIFEROL) 1000 UNIT TABS tablet Take 1,000 Units by mouth daily      aspirin 81 MG tablet Take 81 mg by mouth every other day       oxyCODONE HCl (OXY-IR) 10 MG immediate release tablet Take 10 mg by mouth 3 times daily as needed for Pain.       Loratadine 10 MG CAPS

## 2020-02-24 RX ORDER — ATORVASTATIN CALCIUM 80 MG/1
TABLET, FILM COATED ORAL
Qty: 90 TABLET | Refills: 0 | Status: SHIPPED | OUTPATIENT
Start: 2020-02-24 | End: 2020-10-21

## 2020-03-13 ENCOUNTER — TELEPHONE (OUTPATIENT)
Dept: FAMILY MEDICINE CLINIC | Age: 80
End: 2020-03-13

## 2020-03-30 RX ORDER — IPRATROPIUM BROMIDE AND ALBUTEROL SULFATE 2.5; .5 MG/3ML; MG/3ML
SOLUTION RESPIRATORY (INHALATION)
Qty: 360 ML | Refills: 0 | Status: SHIPPED | OUTPATIENT
Start: 2020-03-30 | End: 2020-07-10

## 2020-04-09 ENCOUNTER — E-VISIT (OUTPATIENT)
Dept: FAMILY MEDICINE CLINIC | Age: 80
End: 2020-04-09

## 2020-04-09 ENCOUNTER — TELEMEDICINE (OUTPATIENT)
Dept: FAMILY MEDICINE CLINIC | Age: 80
End: 2020-04-09
Payer: MEDICARE

## 2020-04-09 PROCEDURE — 99214 OFFICE O/P EST MOD 30 MIN: CPT | Performed by: FAMILY MEDICINE

## 2020-04-09 RX ORDER — CLOPIDOGREL BISULFATE 75 MG/1
75 TABLET ORAL EVERY OTHER DAY
Qty: 45 TABLET | Refills: 1 | Status: SHIPPED | OUTPATIENT
Start: 2020-04-09 | End: 2020-07-16 | Stop reason: SDUPTHER

## 2020-04-09 RX ORDER — OMEPRAZOLE 20 MG/1
20 CAPSULE, DELAYED RELEASE ORAL 2 TIMES DAILY
Qty: 180 CAPSULE | Refills: 1 | Status: SHIPPED | OUTPATIENT
Start: 2020-04-09 | End: 2020-07-16 | Stop reason: SDUPTHER

## 2020-04-09 ASSESSMENT — ENCOUNTER SYMPTOMS
SHORTNESS OF BREATH: 0
COUGH: 0
ABDOMINAL PAIN: 0
SINUS PRESSURE: 0
CHEST TIGHTNESS: 0
RHINORRHEA: 0
SORE THROAT: 0
DIARRHEA: 0
CONSTIPATION: 0

## 2020-04-09 NOTE — PROGRESS NOTES
Tobacco Use    Smoking status: Former Smoker     Packs/day: 2.00     Years: 31.00     Pack years: 62.00     Start date: 5/10/1943     Last attempt to quit: 1974     Years since quittin.2    Smokeless tobacco: Never Used    Tobacco comment: reviewed 10/13/2015   Substance Use Topics    Alcohol use: No     Alcohol/week: 0.0 standard drinks    Drug use: No            PHYSICAL EXAMINATION:  [ INSTRUCTIONS:  \"[x]\" Indicates a positive item  \"[]\" Indicates a negative item  -- DELETE ALL ITEMS NOT EXAMINED]  Vital Signs: (As obtained by patient/caregiver or practitioner observation)    Blood pressure-  Heart rate-    Respiratory rate-    Temperature-  Pulse oximetry-     Constitutional: [x] Appears well-developed and well-nourished [x] No apparent distress      [] Abnormal-   Mental status  [x] Alert and awake  [x] Oriented to person/place/time [x]Able to follow commands      Eyes:  EOM    []  Normal  [] Abnormal-  Sclera  []  Normal  [] Abnormal -         Discharge []  None visible  [] Abnormal -    HENT:   [x] Normocephalic, atraumatic. [x] Abnormal   [] Mouth/Throat: Mucous membranes are moist.     External Ears [x] Normal  [] Abnormal-     Neck: [x] No visualized mass     Pulmonary/Chest: [x] Respiratory effort normal.  [x] No visualized signs of difficulty breathing or respiratory distress        [] Abnormal-      Musculoskeletal:   [] Normal gait with no signs of ataxia         [] Normal range of motion of neck        [] Abnormal-       Neurological:        [x] No Facial Asymmetry (Cranial nerve 7 motor function) (limited exam to video visit)          [x] No gaze palsy        [] Abnormal-         Skin:        [x] No significant exanthematous lesions or discoloration noted on facial skin         [] Abnormal-            Psychiatric:       [x] Normal Affect [x] No Hallucinations        [] Abnormal-     Other pertinent observable physical exam findings-     ASSESSMENT/PLAN:  1.  Chronic midline low back pain without sciatica  Patient feels pain is treated appropriately. Please remain on the same. No sign of abuse or diversion. 2. COPD, mild ([address]  Issue controlled. Continue meds. Refilled meds. ##  Pt still needs meds for nebulization and therefore also needs his nebulizer machine and related supplies. ##    3. Gastroesophageal reflux disease with esophagitis  Issue controlled. Continue meds. Refilled meds. 4.  Parkinson's  Remain on current meds. Continue to follow symptoms. Recommended using his walking assistance when needed such as cane and walker. Follow-up 4 to 6 months    Sharalyn Lennox is a [de-identified] y.o. male being evaluated by a Virtual Visit (video visit) encounter to address concerns as mentioned above. A caregiver was present when appropriate. Due to this being a TeleHealth encounter (During TWVJG-33 public health emergency), evaluation of the following organ systems was limited: Vitals/Constitutional/EENT/Resp/CV/GI//MS/Neuro/Skin/Heme-Lymph-Imm. Pursuant to the emergency declaration under the [address] [place] [address], [address] [place] [address] authority and the [employer] Act, this Virtual Visit was conducted with patient's (and/or legal guardian's) consent, to reduce the patient's risk of exposure to COVID-19 and provide necessary medical care. The patient (and/or legal guardian) has also been advised to contact this office for worsening conditions or problems, and seek emergency medical treatment and/or call 911 if deemed necessary. Services were provided through a video synchronous discussion virtually to substitute for in-person clinic visit. Patient and provider were located at their individual homes. --Luan Kumar MD on 4/9/2020 at 12:06 PM    An electronic signature was used to authenticate this note.

## 2020-07-10 RX ORDER — IPRATROPIUM BROMIDE AND ALBUTEROL SULFATE 2.5; .5 MG/3ML; MG/3ML
SOLUTION RESPIRATORY (INHALATION)
Qty: 360 ML | Refills: 0 | Status: SHIPPED | OUTPATIENT
Start: 2020-07-10 | End: 2020-11-12 | Stop reason: SDUPTHER

## 2020-07-16 ENCOUNTER — OFFICE VISIT (OUTPATIENT)
Dept: FAMILY MEDICINE CLINIC | Age: 80
End: 2020-07-16
Payer: MEDICARE

## 2020-07-16 VITALS
SYSTOLIC BLOOD PRESSURE: 122 MMHG | HEIGHT: 71 IN | WEIGHT: 155.8 LBS | BODY MASS INDEX: 21.81 KG/M2 | DIASTOLIC BLOOD PRESSURE: 78 MMHG | TEMPERATURE: 97.6 F | HEART RATE: 56 BPM

## 2020-07-16 DIAGNOSIS — E11.9 TYPE 2 DIABETES MELLITUS WITHOUT COMPLICATION, WITHOUT LONG-TERM CURRENT USE OF INSULIN (HCC): ICD-10-CM

## 2020-07-16 PROCEDURE — 99214 OFFICE O/P EST MOD 30 MIN: CPT | Performed by: FAMILY MEDICINE

## 2020-07-16 RX ORDER — MONTELUKAST SODIUM 10 MG/1
10 TABLET ORAL NIGHTLY
Qty: 30 TABLET | Refills: 5 | Status: SHIPPED | OUTPATIENT
Start: 2020-07-16 | End: 2020-11-12 | Stop reason: SDUPTHER

## 2020-07-16 RX ORDER — METHYLPREDNISOLONE 4 MG/1
TABLET ORAL
Qty: 1 KIT | Refills: 0 | Status: SHIPPED | OUTPATIENT
Start: 2020-07-16 | End: 2020-07-22

## 2020-07-16 RX ORDER — OMEPRAZOLE 20 MG/1
20 CAPSULE, DELAYED RELEASE ORAL 2 TIMES DAILY
Qty: 60 CAPSULE | Refills: 5 | Status: SHIPPED | OUTPATIENT
Start: 2020-07-16 | End: 2020-11-12 | Stop reason: SDUPTHER

## 2020-07-16 RX ORDER — CLOPIDOGREL BISULFATE 75 MG/1
75 TABLET ORAL EVERY OTHER DAY
Qty: 15 TABLET | Refills: 5 | Status: SHIPPED | OUTPATIENT
Start: 2020-07-16 | End: 2020-11-12 | Stop reason: SDUPTHER

## 2020-07-16 NOTE — PROGRESS NOTES
Visual inspection:  Deformity/amputation: absent  Skin lesions/pre-ulcerative calluses: absent  Edema: right- negative, left- negative    Sensory exam:  Monofilament sensation: normal  (minimum of 5 random plantar locations tested, avoiding callused areas - > 1 area with absence of sensation is + for neuropathy)    Plus at least one of the following:  Pulses: normal

## 2020-07-17 LAB
ESTIMATED AVERAGE GLUCOSE: 116.9 MG/DL
HBA1C MFR BLD: 5.7 %

## 2020-07-17 NOTE — PROGRESS NOTES
7/16/2020    Pilar Mcgregor    Chief Complaint   Patient presents with    3 Month Follow-Up    Lower Back Pain       HPI    Flower Barnes is a [de-identified] y.o. male who presents today with follow-up. Patient continues with Parkinson's. He has challenging blood pressures. His average is 140/70 but his range is wide and he does have some orthostatic symptoms. He was tried on ProAmatine but got very hypertensive. He is marked as do not retry ProAmatine. Patient denies hypoglycemia. His morning blood sugars average in the 130s although there is quite a range also. Patient is kept his weight steady recently although he is dropped weight over the year. Patient notes he is seen a pain clinic in [place] in which she is using I believe OxyIR 10 mg 4 times daily. He has some radiculopathy to the right leg. He wishes to try Medrol Dosepak to assist as is recently been worse. REVIEW OF SYSTEMS    Constitutional:  Denies fever, chills, weight loss or weakness  Eyes:  no photophobia or discharge  ENT:  no sore throat or ear pain  Cardiovascular:  Denies chest pain, palpitations or swelling  Respiratory:  Denies cough or shortness of breath  GI:  no abdominal pain, nausea, vomiting, or diarrhea  Musculoskeletal:  no back pain  Skin:  No rashes  Neurologic:  no headache, focal weakness, or sensory changes  Endocrine:  no polyuria or polydipsia      PAST MEDICAL HISTORY  Past Medical History:   Diagnosis Date    AAA [facility]abdominal [facility]) without rupture ([facility] [address]     Aneurysm ([facility] [address] 2014    brain - [facility]    Arthritis     CAD (coronary artery disease)     Cerebral aneurysm, nonruptured     Chronic low back pain     Diabetes mellitus (HCC)     GERD (gastroesophageal reflux disease)     H/O 24 hour EKG monitoring 12/7/2013    sinus rhythm, infreq PVCs    H/O cardiovascular stress test 10/30/14, 8/14/2013    10/30/14 Evidence of mild ischemia in the RCA.   EF 64%  8/14/2013 EF58% small amount of ischemia    H/O Doppler ultrasound 09/04/2013    ABDOMINAL AORTIC US-it is noted that the max dimension is 3.24 is slightly higher than the previous one, suggest yearly abd aortic US, however this size is essentially unremarkable and is much less than 5 cm.  H/O Doppler ultrasound 10/27/14    ABD AORTIC US: small aortic aneurysm measuring 2.75cm    H/O echocardiogram 9/3/14, 11/12/2013    9/3/14 EF 60%. Mildly hypertrophic LV. Mild mitral, aortic and tricuspid insufficiencies. 11/12/13 EF55-60% mil MR, mile TR, mild pulm htn    H/O echocardiogram 10/13/15    EF 01% Normal LV systolic function. Sclerotic aortic valve which is nonstenotic. Mod to Severe pulmonary HTN.     H/O exercise stress test 12/1/2014    normal resting ECG, no c/p, ventricular premature beats    Hiatal hernia     History of cardiac monitoring 10/14/15    Event - sinus rhythm    History of methicillin resistant staphylococcus aureus (MRSA)     Carrier of the nose 2012    History of nuclear stress test 09/01/2016    lexiscan-normal,EF64%    History of PFTs 1/9/14    History of recurrent TIAs     Hx of 24 hour EKG monitoring 8/11 08/11=NSR with physiological variation of heart rate, although mostly gloria rhythm; 04/00    Hx of cardiovascular stress test 6/12;6/11 06/12=normal perfusion EF 60%; 06/11=EF 65%; 03/11=EF 55%; 03/09=EF 64%; 09/07=EF 62%; 03/03;04/98;02/94;02/00    Hx of cardiovascular stress test 9/30/2015    lexiscan-normal,EF68%    Hx of Doppler echocardiogram 9/12    carotid 9/12=no sig. plaques seen; 08/05    Hx of Doppler ultrasound 6/08    peripheral doppler 06/08=no sig.  PVD    Hx of Doppler ultrasound 9/30/15    Abdominal aortic aneurysm involving the distal abdomianal aorta with a maximum anterior posterior diameter of 3.03cm    Hx of echocardiogram 6/12;8/11 06/12=normal with EF 60%; 08/11=55%; 01/09=EF 55%; 02/04; 10/98; 10/97; 5/96; 5/95    Hx of fracture of vertebral column 9/2012    4 vertebra fractures    Hx of tilt table evaluation     marked vasovagal response with a drop of the BP without any increment in his heartrate.  Hx of transesophageal echocardiography ([facility]) for monitoring 11/10    11/10=mild aortic regurg., probably atrial septal aneurysm, no other sig.  abnormality noted    Hyperhomocysteinemia (HCC)     Hyperlipidemia     Laceration of head 2012    6 staples    Neurogenic syncope     Obstructive sleep apnea     Parkinson disease ([facility] [address] 2019    Recurrent sinusitis     S/P dilatation of esophageal stricture     TIA (transient ischemic attack)     x2    Unspecified cerebral artery occlusion with cerebral infarction     Vertebral fracture        FAMILY HISTORY  Family History   Problem Relation Age of Onset    Arthritis Mother     Diabetes Mother     High Cholesterol Mother     Other Father         Brain aneurysm @38    Asthma Son     Seizures Son        SOCIAL HISTORY  Social History     Socioeconomic History    Marital status:      Spouse name: Not on file    Number of children: 2    Years of education: Not on file    Highest education level: Not on file   Occupational History    Not on file   Social Needs    Financial resource strain: Not on file    Food insecurity     Worry: Not on file     Inability: Not on file    Transportation needs     Medical: Not on file     Non-medical: Not on file   Tobacco Use    Smoking status: Former Smoker     Packs/day: 2.00     Years: 31.00     Pack years: 62.00     Start date: 5/10/1943     Last attempt to quit: 1974     Years since quittin.5    Smokeless tobacco: Never Used    Tobacco comment: reviewed 10/13/2015   Substance and Sexual Activity    Alcohol use: No     Alcohol/week: 0.0 standard drinks    Drug use: No    Sexual activity: Yes     Partners: Female     Comment:     Lifestyle    Physical activity     Days per week: Not on file     Minutes per session: Not on file    Stress: Not on file Relationships    Social connections     Talks on phone: Not on file     Gets together: Not on file     Attends Shinto service: Not on file     Active member of club or organization: Not on file     Attends meetings of clubs or organizations: Not on file     Relationship status: Not on file    Intimate partner violence     Fear of current or ex partner: Not on file     Emotionally abused: Not on file     Physically abused: Not on file     Forced sexual activity: Not on file   Other Topics Concern    Not on file   Social History Narrative    Not on file        SURGICAL HISTORY  Past Surgical History:   Procedure Laterality Date    ABDOMINAL AORTIC ANEURYSM REPAIR  9/23/09    3.6x2.7cm.  APPENDECTOMY      CARPAL TUNNEL RELEASE      right    CARPAL TUNNEL RELEASE Left     COLONOSCOPY      ELBOW SURGERY      left x4    ENDOSCOPY, COLON, DIAGNOSTIC      FOOT SURGERY      left    HERNIA REPAIR      INGUINAL HERNIA REPAIR Right     PTCA  5/11;4/98;3/94    05/11= 3.5x28 taxus stent of RCA and distal RCA with lesion 30-40%to be closely monitored. 04/98=mild LV dysfunction, mild CAD; 03/94    PTCA  11/2014    Stent to the LAD    PTCA      SINUS SURGERY      x4    TOTAL KNEE ARTHROPLASTY Right 10/2017    UPPER GASTROINTESTINAL ENDOSCOPY      twice       CURRENT MEDICATIONS  Current Outpatient Medications   Medication Sig Dispense Refill    omeprazole (PRILOSEC) 20 MG delayed release capsule Take 1 capsule by mouth 2 times daily 60 capsule 5    montelukast (SINGULAIR) 10 MG tablet Take 1 tablet by mouth nightly 30 tablet 5    clopidogrel (PLAVIX) 75 MG tablet Take 1 tablet by mouth every other day 15 tablet 5    methylPREDNISolone (MEDROL DOSEPACK) 4 MG tablet Take by mouth.  1 kit 0    ipratropium-albuterol (DUONEB) 0.5-2.5 (3) MG/3ML SOLN nebulizer solution USE 1 VIAL PER NEBULIZER FOUR TIMES A DAY AS NEEDED. 360 mL 0    mupirocin (BACTROBAN) 2 % ointment APPLY TO AFFECTED AREA(S) TWO TIMES A DAY 60 g 2    atorvastatin (LIPITOR) 80 MG tablet TAKE 1 TABLET BY MOUTH ONE TIME A DAY  90 tablet 0    DULoxetine (CYMBALTA) 20 MG extended release capsule Take 20 mg by mouth daily      mometasone (ASMANEX) 220 MCG/INH inhaler Inhale 2 puffs into the lungs daily      gabapentin (NEURONTIN) 100 MG capsule Take 4 capsules by mouth nightly for 30 days. 120 capsule 5    donepezil (ARICEPT) 10 MG tablet Take 1 tablet by mouth daily 30 tablet 5    sodium chloride 0.9 % irrigation RINSE SINUSES TWO TIMES A DAY AFTER MIXING 1 TUBE OF BACTROBAN OINTMENT INTO BOTTLE OF SOLUTION 6000 mL 5    nitroGLYCERIN (NITROSTAT) 0.4 MG SL tablet Place 1 tablet under the tongue every 5 minutes as needed for Chest pain 25 tablet 1    albuterol (PROVENTIL) (2.5 MG/3ML) 0.083% nebulizer solution Take 2.5 mg by nebulization 4 times daily as needed      carbidopa-levodopa (SINEMET)  MG per tablet Take 1 tablet by mouth 3 times daily      LANCETS MICRO THIN 33G MISC 1 Units by Does not apply route daily      blood glucose test strips (ASCENSIA AUTODISC VI;ONE TOUCH ULTRA TEST VI) strip 1 each by In Vitro route daily as needed As needed.  budesonide-formoterol (SYMBICORT) 80-4.5 MCG/ACT AERO Inhale 2 puffs into the lungs 2 times daily Rinse Mouth after use.  tiotropium (SPIRIVA RESPIMAT) 2.5 MCG/ACT AERS inhaler Inhale 2 puffs into the lungs 2 times daily       vitamin D (CHOLECALCIFEROL) 1000 UNIT TABS tablet Take 1,000 Units by mouth daily      aspirin 81 MG tablet Take 81 mg by mouth every other day       oxyCODONE HCl (OXY-IR) 10 MG immediate release tablet Take 10 mg by mouth 4 times daily as needed for Pain.  Loratadine 10 MG CAPS Take 10 mg by mouth daily. No current facility-administered medications for this visit.         ALLERGIES  Allergies   Allergen Reactions    Nsaids     Tramadol Hcl      Confusion       PHYSICAL EXAM    /78   Pulse 56   Temp 97.6 °F (36.4 °C)   Ht 5' 11\" (1.803 m) Wt 155 lb 12.8 oz (70.7 kg)   BMI 21.73 kg/m²     Constitutional:  Well developed, well nourished  HEENT:  Normocephalic, atraumatic, bilateral external ears normal, oropharynx moist, nose normal  Neck:  Normal range of motion, no tenderness, supple  Lymphatic:  No lymphadenopathy noted  Cardiovascular:  Normal heart rate, S1S2 nl  Thorax & Lungs:  Normal breath sounds, no respiratory distress, no wheezing  Skin:  Warm, dry, no erythema, no rash  Back:  straight  Extremities:  No edema, no tenderness, no cyanosis  Musculoskeletal:  Good range of motion   Neurologic:  Alert & oriented X 3      ASSESSMENT & PLAN    1. Parkinson disease (HCC)  Issue controlled. Continue meds. Refilled meds. .    2. Type 2 diabetes mellitus without complication, without long-term current use of insulin ([facility])  Issue is stable check labs today. Adjust medication off of lab results. -  DIABETES FOOT EXAM  - Hemoglobin A1C; Future    3. Obstructive sleep apnea  Patient notes compliance and he benefits from it. 4. Mixed hyperlipidemia  Reviewed labs. At goal.  Continue the same. 5. Gastroesophageal reflux disease without esophagitis  - omeprazole (PRILOSEC) 20 MG delayed release capsule; Take 1 capsule by mouth 2 times daily  Dispense: 60 capsule; Refill: 5    6. Chronic midline low back pain with right-sided sciatica  Not at goal.  Add steroid orally. Continue with pain clinic  - methylPREDNISolone (MEDROL DOSEPACK) 4 MG tablet; Take by mouth. Dispense: 1 kit;  Refill: 0      Follow-up 4 months    Electronically signed by Mayela Whitaker MD on 7/16/2020

## 2020-09-30 ENCOUNTER — TELEPHONE (OUTPATIENT)
Dept: FAMILY MEDICINE CLINIC | Age: 80
End: 2020-09-30

## 2020-09-30 NOTE — TELEPHONE ENCOUNTER
Pt would like to have [id number] call him regarding information on diabetic shoes-- please call pt back

## 2020-09-30 NOTE — TELEPHONE ENCOUNTER
Spoke with pt. Pt states would like for our office to contact [employer] re: order dm shoes.   Rep's name Shakeel Mable  Ph   Fax [phone] with Rina Bullard pt demographic, ins \" pt gave approval    Faxed info

## 2020-11-12 ENCOUNTER — OFFICE VISIT (OUTPATIENT)
Dept: FAMILY MEDICINE CLINIC | Age: 80
End: 2020-11-12
Payer: MEDICARE

## 2020-11-12 VITALS
BODY MASS INDEX: 23.96 KG/M2 | HEIGHT: 70 IN | DIASTOLIC BLOOD PRESSURE: 60 MMHG | HEART RATE: 56 BPM | SYSTOLIC BLOOD PRESSURE: 114 MMHG | WEIGHT: 167.4 LBS | TEMPERATURE: 97.1 F

## 2020-11-12 PROBLEM — F02.80 LEWY BODY DEMENTIA WITHOUT BEHAVIORAL DISTURBANCE (HCC): Status: ACTIVE | Noted: 2020-11-12

## 2020-11-12 PROBLEM — B18.0 CHRONIC VIRAL HEPATITIS B WITHOUT COMA AND WITH DELTA AGENT (HCC): Status: ACTIVE | Noted: 2020-11-12

## 2020-11-12 PROBLEM — G31.83 LEWY BODY DEMENTIA WITHOUT BEHAVIORAL DISTURBANCE (HCC): Status: ACTIVE | Noted: 2020-11-12

## 2020-11-12 PROCEDURE — 99214 OFFICE O/P EST MOD 30 MIN: CPT | Performed by: FAMILY MEDICINE

## 2020-11-12 RX ORDER — NITROGLYCERIN 0.4 MG/1
0.4 TABLET SUBLINGUAL EVERY 5 MIN PRN
Qty: 25 TABLET | Refills: 1 | Status: SHIPPED | OUTPATIENT
Start: 2020-11-12 | End: 2022-06-27

## 2020-11-12 RX ORDER — AMOXICILLIN 500 MG/1
CAPSULE ORAL
COMMUNITY
Start: 2020-11-06 | End: 2021-02-11 | Stop reason: ALTCHOICE

## 2020-11-12 RX ORDER — CARBIDOPA AND LEVODOPA 50; 200 MG/1; MG/1
1 TABLET, EXTENDED RELEASE ORAL NIGHTLY
COMMUNITY
End: 2021-02-11 | Stop reason: DRUGHIGH

## 2020-11-12 RX ORDER — MONTELUKAST SODIUM 10 MG/1
10 TABLET ORAL NIGHTLY
Qty: 30 TABLET | Refills: 5 | Status: SHIPPED | OUTPATIENT
Start: 2020-11-12 | End: 2021-05-24

## 2020-11-12 RX ORDER — OMEPRAZOLE 20 MG/1
20 CAPSULE, DELAYED RELEASE ORAL 2 TIMES DAILY
Qty: 60 CAPSULE | Refills: 5 | Status: SHIPPED | OUTPATIENT
Start: 2020-11-12 | End: 2021-02-11 | Stop reason: SDUPTHER

## 2020-11-12 RX ORDER — CLOPIDOGREL BISULFATE 75 MG/1
75 TABLET ORAL EVERY OTHER DAY
Qty: 15 TABLET | Refills: 5 | Status: SHIPPED | OUTPATIENT
Start: 2020-11-12 | End: 2022-06-27

## 2020-11-12 RX ORDER — IPRATROPIUM BROMIDE AND ALBUTEROL SULFATE 2.5; .5 MG/3ML; MG/3ML
SOLUTION RESPIRATORY (INHALATION)
Qty: 360 ML | Refills: 0 | Status: SHIPPED | OUTPATIENT
Start: 2020-11-12 | End: 2021-05-20

## 2020-11-12 RX ORDER — DONEPEZIL HYDROCHLORIDE 10 MG/1
10 TABLET, FILM COATED ORAL DAILY
Qty: 30 TABLET | Refills: 5 | Status: SHIPPED | OUTPATIENT
Start: 2020-11-12 | End: 2021-09-13 | Stop reason: SDUPTHER

## 2020-11-15 NOTE — PROGRESS NOTES
11/15/2020    Arin Manning    Chief Complaint   Patient presents with   Eliana Michael Other     4 month follow up    Other     no c/o       HPI    Carly Dent is a 79 y.o. male who presents today with follow-up. Patient is up 12 pounds yet he denies shortness of breath. He notes he is eating a lot better and feels better. Patient confirmed to his diagnosis of Lewy body dementia although his mental status appears better now than it has in the last 2 months. He recalls his diagnosis of hepatitis B and feels that he is doing better from that as he is recovering and gaining weight. 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 He states he is taking a multivitamin did not know why. Patient is aware of his AAA. He states that Dr. Gertrude Stark his cardiologist in [facility] is following. Patient states his blood sugars been running in 140s to 150s. His last A1c 4 months ago was 5.7.  3 days ago his bad cholesterol was at goal at 66.     REVIEW OF SYSTEMS    Constitutional:  Denies fever, chills, weight loss or weakness  Eyes:  no photophobia or discharge  ENT:  no sore throat or ear pain  Cardiovascular:  Denies chest pain, palpitations or swelling  Respiratory:  Denies cough or shortness of breath  GI:  no abdominal pain, nausea, vomiting, or diarrhea  Musculoskeletal:  no back pain  Skin:  No rashes  Neurologic:  no headache, focal weakness, or sensory changes  Endocrine:  no polyuria or polydipsia      PAST MEDICAL HISTORY  Past Medical History:   Diagnosis Date    AAA (abdominal aortic aneurysm) without rupture ([address]     Aneurysm ([address] 2014    brain - [facility]    Arthritis     CAD (coronary artery disease)     Cerebral aneurysm, nonruptured     Chronic low back pain     Chronic viral hepatitis B without coma and with delta agent ([address] 11/12/2020    Diabetes mellitus (HCC)     GERD (gastroesophageal reflux disease)     H/O 24 hour EKG monitoring 12/7/2013    sinus rhythm, infreq PVCs    H/O cardiovascular stress test 10/30/14, 8/14/2013    10/30/14 Evidence of mild ischemia in the RCA. EF 64%  8/14/2013 EF58% small amount of ischemia    H/O Doppler ultrasound 09/04/2013    ABDOMINAL AORTIC US-it is noted that the max dimension is 3.24 is slightly higher than the previous one, suggest yearly abd aortic US, however this size is essentially unremarkable and is much less than 5 cm.  H/O Doppler ultrasound 10/27/14    ABD AORTIC US: small aortic aneurysm measuring 2.75cm    H/O echocardiogram 9/3/14, 11/12/2013    9/3/14 EF 60%. Mildly hypertrophic LV. Mild mitral, aortic and tricuspid insufficiencies. 11/12/13 EF55-60% mil MR, mile TR, mild pulm htn    H/O echocardiogram 10/13/15    EF 05% Normal LV systolic function. Sclerotic aortic valve which is nonstenotic. Mod to Severe pulmonary HTN.     H/O exercise stress test 12/1/2014    normal resting ECG, no c/p, ventricular premature beats    Hiatal hernia     History of cardiac monitoring 10/14/15    Event - sinus rhythm    History of methicillin resistant staphylococcus aureus (MRSA)     Carrier of the nose 2012    History of nuclear stress test 09/01/2016    lexiscan-normal,EF64%    History of PFTs 1/9/14    History of recurrent TIAs     Hx of 24 hour EKG monitoring 8/11 08/11=NSR with physiological variation of heart rate, although mostly gloria rhythm; 04/00    Hx of cardiovascular stress test 6/12;6/11 06/12=normal perfusion EF 60%; 06/11=EF 65%; 03/11=EF 55%; 03/09=EF 64%; 09/07=EF 62%; 03/03;04/98;02/94;02/00    Hx of cardiovascular stress test 9/30/2015    lexiscan-normal,EF68%    Hx of Doppler echocardiogram 9/12    carotid 9/12=no sig. plaques seen; 08/05    Hx of Doppler ultrasound 6/08    peripheral doppler 06/08=no sig.  PVD    Hx of Doppler ultrasound 9/30/15    Abdominal aortic aneurysm involving the distal abdomianal aorta with a maximum anterior posterior diameter of 3.03cm    Hx of echocardiogram 6/12;8/11 06/12=normal with EF 60%; =55%; =EF 55%; ; 10/98; 10/97; ;     Hx of fracture of vertebral column 2012    4 vertebra fractures    Hx of tilt table evaluation     marked vasovagal response with a drop of the BP without any increment in his heartrate.  Hx of transesophageal echocardiography ([facility]) for monitoring 11/10    11/10=mild aortic regurg., probably atrial septal aneurysm, no other sig.  abnormality noted    Hyperhomocysteinemia ([address]     Hyperlipidemia     Laceration of head 2012    6 staples    Lewy body dementia without behavioral disturbance ([address] 2020    Neurogenic syncope     Obstructive sleep apnea     Parkinson disease ([address] 2019    Recurrent sinusitis     S/P dilatation of esophageal stricture     TIA (transient ischemic attack)     x2    Unspecified cerebral artery occlusion with cerebral infarction     Vertebral fracture        FAMILY HISTORY  Family History   Problem Relation Age of Onset    Arthritis Mother     Diabetes Mother     High Cholesterol Mother     Other Father         Brain aneurysm @38    Asthma Son     Seizures Son        SOCIAL HISTORY  Social History     Socioeconomic History    Marital status:      Spouse name: None    Number of children: 2    Years of education: None    Highest education level: None   Occupational History    None   Social Needs    Financial resource strain: None    Food insecurity     Worry: None     Inability: None    Transportation needs     Medical: None     Non-medical: None   Tobacco Use    Smoking status: Former Smoker     Packs/day: 2.00     Years: 31.00     Pack years: 62.00     Start date: 5/10/1943     Last attempt to quit: 1974     Years since quittin.8    Smokeless tobacco: Never Used    Tobacco comment: reviewed 10/13/2015   Substance and Sexual Activity    Alcohol use: No     Alcohol/week: 0.0 standard drinks    Drug use: No    Sexual activity: Yes     Partners: Female Comment:     Lifestyle    Physical activity     Days per week: None     Minutes per session: None    Stress: None   Relationships    Social connections     Talks on phone: None     Gets together: None     Attends Buddhist service: None     Active member of club or organization: None     Attends meetings of clubs or organizations: None     Relationship status: None    Intimate partner violence     Fear of current or ex partner: None     Emotionally abused: None     Physically abused: None     Forced sexual activity: None   Other Topics Concern    None   Social History Narrative    None        SURGICAL HISTORY  Past Surgical History:   Procedure Laterality Date    ABDOMINAL AORTIC ANEURYSM REPAIR  9/23/09    3.6x2.7cm.  APPENDECTOMY      CARPAL TUNNEL RELEASE      right    CARPAL TUNNEL RELEASE Left     COLONOSCOPY      ELBOW SURGERY      left x4    ENDOSCOPY, COLON, DIAGNOSTIC      FOOT SURGERY      left    HERNIA REPAIR      INGUINAL HERNIA REPAIR Right     PTCA  5/11;4/98;3/94    05/11= 3.5x28 taxus stent of RCA and distal RCA with lesion 30-40%to be closely monitored. 04/98=mild LV dysfunction, mild CAD; 03/94    PTCA  11/2014    Stent to the LAD    PTCA      SINUS SURGERY      x4    TOTAL KNEE ARTHROPLASTY Right 10/2017    UPPER GASTROINTESTINAL ENDOSCOPY      twice       CURRENT MEDICATIONS  Current Outpatient Medications   Medication Sig Dispense Refill    amoxicillin (AMOXIL) 500 MG capsule TAKE 1 CAPSULE BY MOUTH THREE TIMES A DAY UNTIL GONE      carbidopa-levodopa (SINEMET CR)  MG per extended release tablet Take 1 tablet by mouth nightly      omeprazole (PRILOSEC) 20 MG delayed release capsule Take 1 capsule by mouth 2 times daily 60 capsule 5    montelukast (SINGULAIR) 10 MG tablet Take 1 tablet by mouth nightly 30 tablet 5    clopidogrel (PLAVIX) 75 MG tablet Take 1 tablet by mouth every other day 15 tablet 5    ipratropium-albuterol (DUONEB) 0.5-2.5 (3) MG/3ML SOLN nebulizer solution USE 1 VIAL PER NEBULIZER FOUR TIMES A DAY AS NEEDED. 360 mL 0    mupirocin (BACTROBAN) 2 % ointment APPLY TO AFFECTED AREA(S) TWO TIMES A DAY 60 g 2    donepezil (ARICEPT) 10 MG tablet Take 1 tablet by mouth daily 30 tablet 5    nitroGLYCERIN (NITROSTAT) 0.4 MG SL tablet Place 1 tablet under the tongue every 5 minutes as needed for Chest pain 25 tablet 1    atorvastatin (LIPITOR) 80 MG tablet TAKE ONE TABLET BY MOUTH DAILY 30 tablet 5    DULoxetine (CYMBALTA) 20 MG extended release capsule Take 20 mg by mouth daily      mometasone (ASMANEX) 220 MCG/INH inhaler Inhale 2 puffs into the lungs daily      gabapentin (NEURONTIN) 100 MG capsule Take 4 capsules by mouth nightly for 30 days. 120 capsule 5    sodium chloride 0.9 % irrigation RINSE SINUSES TWO TIMES A DAY AFTER MIXING 1 TUBE OF BACTROBAN OINTMENT INTO BOTTLE OF SOLUTION 6000 mL 5    albuterol (PROVENTIL) (2.5 MG/3ML) 0.083% nebulizer solution Take 2.5 mg by nebulization 4 times daily as needed      carbidopa-levodopa (SINEMET)  MG per tablet Take 1 tablet by mouth 3 times daily      LANCETS MICRO THIN 33G MISC 1 Units by Does not apply route daily      blood glucose test strips (ASCENSIA AUTODISC VI;ONE TOUCH ULTRA TEST VI) strip 1 each by In Vitro route daily as needed As needed.  budesonide-formoterol (SYMBICORT) 80-4.5 MCG/ACT AERO Inhale 2 puffs into the lungs 2 times daily Rinse Mouth after use.  tiotropium (SPIRIVA RESPIMAT) 2.5 MCG/ACT AERS inhaler Inhale 2 puffs into the lungs 2 times daily       vitamin D (CHOLECALCIFEROL) 1000 UNIT TABS tablet Take 1,000 Units by mouth daily      aspirin 81 MG tablet Take 81 mg by mouth every other day       oxyCODONE HCl (OXY-IR) 10 MG immediate release tablet Take 10 mg by mouth 4 times daily as needed for Pain.  Loratadine 10 MG CAPS Take 10 mg by mouth daily. No current facility-administered medications for this visit. ALLERGIES  Allergies   Allergen Reactions    Nsaids     Tramadol Hcl      Confusion       PHYSICAL EXAM    /60   Pulse 56   Temp 97.1 °F (36.2 °C)   Ht 5' 10\" (1.778 m)   Wt 167 lb 6.4 oz (75.9 kg)   BMI 24.02 kg/m²     Constitutional:  Well developed, well nourished  HEENT:  Normocephalic, atraumatic, bilateral external ears normal, oropharynx moist, nose normal  Neck:  Normal range of motion, no tenderness, supple  Lymphatic:  No lymphadenopathy noted  Cardiovascular:  Normal heart rate, S1S2 nl  Thorax & Lungs:  Normal breath sounds, no respiratory distress, no wheezing  Skin:  Warm, dry, no erythema, no rash  Back:  straight  Extremities:  No edema, no tenderness, no cyanosis  Musculoskeletal:  Good range of motion   Neurologic:  Alert & oriented X 3      ASSESSMENT & PLAN    1. Type 2 diabetes mellitus with complication, without long-term current use of insulin (HCC)  Issue controlled. Continue meds. Refilled meds. Recheck labs on follow-up    2. Pulmonary hypertension (HCC)  Currently asymptomatic other than COPD    3. AAA (abdominal aortic aneurysm) without rupture [facility])  Follow-up cardiology    4. Hyperhomocysteinemia (HCC)  Remain on folic acid    5. Lewy body dementia without behavioral disturbance ([facility]  Doing excellently with the better nutrition. Continue the same  - donepezil (ARICEPT) 10 MG tablet; Take 1 tablet by mouth daily  Dispense: 30 tablet; Refill: 5    6. Chronic viral hepatitis B without coma and with delta agent (HCC)  Issue controlled. Continue meds. Refilled meds. 7. Gastroesophageal reflux disease without esophagitis  Issue controlled. Continue meds. Refilled meds. - omeprazole (PRILOSEC) 20 MG delayed release capsule; Take 1 capsule by mouth 2 times daily  Dispense: 60 capsule; Refill: 5    8. Recurrent sinusitis  Continue saline and Singulair.  - montelukast (SINGULAIR) 10 MG tablet; Take 1 tablet by mouth nightly  Dispense: 30 tablet;  Refill: 5  - mupirocin (BACTROBAN) 2 % ointment; APPLY TO AFFECTED AREA(S) TWO TIMES A DAY  Dispense: 60 g; Refill: 2    9. Chronic obstructive pulmonary disease, unspecified COPD type ([facility])  Issue controlled. Continue meds. Refilled meds. - ipratropium-albuterol (DUONEB) 0.5-2.5 (3) MG/3ML SOLN nebulizer solution; USE 1 VIAL PER NEBULIZER FOUR TIMES A DAY AS NEEDED. Dispense: 360 mL; Refill: 0    Continue following up with his multiple specialist.  Follow-up with me in 6 months or earlier as needed.        Electronically signed by Jayme Evans MD on 11/15/2020

## 2021-01-29 RX ORDER — MAGNESIUM HYDROXIDE 1200 MG/15ML
LIQUID ORAL
Refills: 4 | OUTPATIENT
Start: 2021-01-29

## 2021-02-01 RX ORDER — MAGNESIUM HYDROXIDE 1200 MG/15ML
LIQUID ORAL
Qty: 6000 ML | Refills: 5 | Status: SHIPPED | OUTPATIENT
Start: 2021-02-01 | End: 2022-02-14 | Stop reason: SDUPTHER

## 2021-02-01 NOTE — TELEPHONE ENCOUNTER
Requested Prescriptions     Signed Prescriptions Disp Refills    sodium chloride 0.9 % irrigation 6000 mL 5     Sig: RINSE SINUSES TWO TIMES A DAY AFTER MIXING 1 TUBE OF BACTROBAN OINTMENT INTO BOTTLE OF SOLUTION     Authorizing Provider: Melina Hinkle     Ordering User: Katherine Orona

## 2021-02-11 ENCOUNTER — VIRTUAL VISIT (OUTPATIENT)
Dept: FAMILY MEDICINE CLINIC | Age: 81
End: 2021-02-11
Payer: MEDICARE

## 2021-02-11 DIAGNOSIS — M15.9 PRIMARY OSTEOARTHRITIS INVOLVING MULTIPLE JOINTS: ICD-10-CM

## 2021-02-11 DIAGNOSIS — K21.9 GASTROESOPHAGEAL REFLUX DISEASE WITHOUT ESOPHAGITIS: ICD-10-CM

## 2021-02-11 DIAGNOSIS — T48.5X5A RHINITIS MEDICAMENTOSA: ICD-10-CM

## 2021-02-11 DIAGNOSIS — G20 PARKINSON DISEASE (HCC): ICD-10-CM

## 2021-02-11 DIAGNOSIS — J31.0 RHINITIS MEDICAMENTOSA: ICD-10-CM

## 2021-02-11 DIAGNOSIS — E11.8 TYPE 2 DIABETES MELLITUS WITH COMPLICATION, WITHOUT LONG-TERM CURRENT USE OF INSULIN (HCC): Primary | ICD-10-CM

## 2021-02-11 PROBLEM — M15.0 PRIMARY OSTEOARTHRITIS INVOLVING MULTIPLE JOINTS: Status: ACTIVE | Noted: 2021-02-11

## 2021-02-11 PROCEDURE — 1123F ACP DISCUSS/DSCN MKR DOCD: CPT | Performed by: FAMILY MEDICINE

## 2021-02-11 PROCEDURE — 99214 OFFICE O/P EST MOD 30 MIN: CPT | Performed by: FAMILY MEDICINE

## 2021-02-11 PROCEDURE — 4040F PNEUMOC VAC/ADMIN/RCVD: CPT | Performed by: FAMILY MEDICINE

## 2021-02-11 PROCEDURE — 1036F TOBACCO NON-USER: CPT | Performed by: FAMILY MEDICINE

## 2021-02-11 PROCEDURE — G8484 FLU IMMUNIZE NO ADMIN: HCPCS | Performed by: FAMILY MEDICINE

## 2021-02-11 PROCEDURE — G8420 CALC BMI NORM PARAMETERS: HCPCS | Performed by: FAMILY MEDICINE

## 2021-02-11 PROCEDURE — G8427 DOCREV CUR MEDS BY ELIG CLIN: HCPCS | Performed by: FAMILY MEDICINE

## 2021-02-11 RX ORDER — DULOXETIN HYDROCHLORIDE 60 MG/1
60 CAPSULE, DELAYED RELEASE ORAL DAILY
COMMUNITY
Start: 2021-01-06 | End: 2021-09-13 | Stop reason: SDUPTHER

## 2021-02-11 RX ORDER — GABAPENTIN 400 MG/1
CAPSULE ORAL
COMMUNITY

## 2021-02-11 RX ORDER — CARBIDOPA AND LEVODOPA 25; 100 MG/1; MG/1
1 TABLET, EXTENDED RELEASE ORAL 2 TIMES DAILY
COMMUNITY
Start: 2021-01-27 | End: 2021-09-13 | Stop reason: SDUPTHER

## 2021-02-11 RX ORDER — AMLODIPINE BESYLATE 5 MG/1
5 TABLET ORAL DAILY
COMMUNITY
Start: 2021-01-11 | End: 2021-09-13 | Stop reason: SDUPTHER

## 2021-02-11 RX ORDER — IPRATROPIUM BROMIDE 42 UG/1
2 SPRAY, METERED NASAL 4 TIMES DAILY
Qty: 1 BOTTLE | Refills: 5 | Status: SHIPPED | OUTPATIENT
Start: 2021-02-11 | End: 2021-09-13 | Stop reason: SDUPTHER

## 2021-02-11 RX ORDER — OMEPRAZOLE 20 MG/1
20 CAPSULE, DELAYED RELEASE ORAL 2 TIMES DAILY
Qty: 60 CAPSULE | Refills: 5 | Status: SHIPPED | OUTPATIENT
Start: 2021-02-11 | End: 2021-09-13 | Stop reason: SDUPTHER

## 2021-02-11 ASSESSMENT — PATIENT HEALTH QUESTIONNAIRE - PHQ9
SUM OF ALL RESPONSES TO PHQ QUESTIONS 1-9: 0
SUM OF ALL RESPONSES TO PHQ9 QUESTIONS 1 & 2: 0
2. FEELING DOWN, DEPRESSED OR HOPELESS: 0

## 2021-02-12 NOTE — PROGRESS NOTES
Beverly Marrero is a 79 y.o. male evaluated via telephone on 2/11/2021. Consent:  He and/or health care decision maker is aware that that he may receive a bill for this telephone service, depending on his insurance coverage, and has provided verbal consent to proceed: Yes      Documentation:  I communicated with the patient and/or health care decision maker about sinus drainage, treatment of chronic pain and diabetes type 2  . Details of this discussion including any medical advice provided: Patient notes he was treated for rhinitis medicamentosa by an ENT doctor but they are not going back. He would like us to prescribe. Will fill for ipratropium bromide 0.06% up to 4 times daily as needed. Patient wishes to change his pain management over to [facility] and Julia Pippins. I will put in a consult. Patient notes that his morning blood sugars have increased to between 150-165 in the mornings. The used to have excellent control so he had taken his Metformin away. I asked him to restart the Metformin working up to 500 mg 1 twice daily over a week. Follow-up in 4 months    I affirm this is a Patient Initiated Episode with a Patient who has not had a related appointment within my department in the past 7 days or scheduled within the next 24 hours.     Patient identification was verified at the start of the visit: Yes    Total Time: minutes: 11-20 minutes    Note: not billable if this call serves to triage the patient into an appointment for the relevant concern      Shaji Garcia

## 2021-04-27 RX ORDER — ATORVASTATIN CALCIUM 80 MG/1
TABLET, FILM COATED ORAL
Qty: 30 TABLET | Refills: 3 | Status: SHIPPED | OUTPATIENT
Start: 2021-04-27 | End: 2021-08-19

## 2021-05-12 ENCOUNTER — OFFICE VISIT (OUTPATIENT)
Dept: FAMILY MEDICINE CLINIC | Age: 81
End: 2021-05-12
Payer: MEDICARE

## 2021-05-12 ENCOUNTER — VIRTUAL VISIT (OUTPATIENT)
Dept: FAMILY MEDICINE CLINIC | Age: 81
End: 2021-05-12
Payer: MEDICARE

## 2021-05-12 VITALS
BODY MASS INDEX: 24.77 KG/M2 | HEIGHT: 70 IN | DIASTOLIC BLOOD PRESSURE: 78 MMHG | OXYGEN SATURATION: 95 % | WEIGHT: 173 LBS | HEART RATE: 90 BPM | SYSTOLIC BLOOD PRESSURE: 121 MMHG

## 2021-05-12 VITALS
HEIGHT: 70 IN | HEART RATE: 90 BPM | BODY MASS INDEX: 24.77 KG/M2 | DIASTOLIC BLOOD PRESSURE: 78 MMHG | SYSTOLIC BLOOD PRESSURE: 121 MMHG | OXYGEN SATURATION: 95 % | WEIGHT: 173 LBS | RESPIRATION RATE: 16 BRPM

## 2021-05-12 DIAGNOSIS — J44.9 CHRONIC OBSTRUCTIVE PULMONARY DISEASE, UNSPECIFIED COPD TYPE (HCC): ICD-10-CM

## 2021-05-12 DIAGNOSIS — F02.80 LEWY BODY DEMENTIA WITHOUT BEHAVIORAL DISTURBANCE (HCC): ICD-10-CM

## 2021-05-12 DIAGNOSIS — Z00.00 ROUTINE GENERAL MEDICAL EXAMINATION AT A HEALTH CARE FACILITY: Primary | ICD-10-CM

## 2021-05-12 DIAGNOSIS — E11.8 TYPE 2 DIABETES MELLITUS WITH COMPLICATION, WITHOUT LONG-TERM CURRENT USE OF INSULIN (HCC): ICD-10-CM

## 2021-05-12 DIAGNOSIS — I25.110 CORONARY ARTERY DISEASE INVOLVING NATIVE CORONARY ARTERY OF NATIVE HEART WITH UNSTABLE ANGINA PECTORIS (HCC): Primary | ICD-10-CM

## 2021-05-12 DIAGNOSIS — G31.83 LEWY BODY DEMENTIA WITHOUT BEHAVIORAL DISTURBANCE (HCC): ICD-10-CM

## 2021-05-12 DIAGNOSIS — I25.110 CORONARY ARTERY DISEASE INVOLVING NATIVE CORONARY ARTERY OF NATIVE HEART WITH UNSTABLE ANGINA PECTORIS (HCC): ICD-10-CM

## 2021-05-12 DIAGNOSIS — I27.20 PULMONARY HYPERTENSION (HCC): ICD-10-CM

## 2021-05-12 LAB
A/G RATIO: 1.6 (ref 1.1–2.2)
ALBUMIN SERPL-MCNC: 4.7 G/DL (ref 3.4–5)
ALP BLD-CCNC: 95 U/L (ref 40–129)
ALT SERPL-CCNC: <5 U/L (ref 10–40)
ANION GAP SERPL CALCULATED.3IONS-SCNC: 10 MMOL/L (ref 3–16)
AST SERPL-CCNC: 15 U/L (ref 15–37)
BASOPHILS ABSOLUTE: 0 K/UL (ref 0–0.2)
BASOPHILS RELATIVE PERCENT: 0.5 %
BILIRUB SERPL-MCNC: 1.1 MG/DL (ref 0–1)
BUN BLDV-MCNC: 19 MG/DL (ref 7–20)
CALCIUM SERPL-MCNC: 10.4 MG/DL (ref 8.3–10.6)
CHLORIDE BLD-SCNC: 99 MMOL/L (ref 99–110)
CHOLESTEROL, TOTAL: 135 MG/DL (ref 0–199)
CO2: 31 MMOL/L (ref 21–32)
CREAT SERPL-MCNC: 0.9 MG/DL (ref 0.8–1.3)
EOSINOPHILS ABSOLUTE: 0.2 K/UL (ref 0–0.6)
EOSINOPHILS RELATIVE PERCENT: 2.8 %
GFR AFRICAN AMERICAN: >60
GFR NON-AFRICAN AMERICAN: >60
GLOBULIN: 3 G/DL
GLUCOSE BLD-MCNC: 182 MG/DL (ref 70–99)
HCT VFR BLD CALC: 44.1 % (ref 40.5–52.5)
HDLC SERPL-MCNC: 39 MG/DL (ref 40–60)
HEMOGLOBIN: 15.1 G/DL (ref 13.5–17.5)
LDL CHOLESTEROL CALCULATED: 69 MG/DL
LYMPHOCYTES ABSOLUTE: 1 K/UL (ref 1–5.1)
LYMPHOCYTES RELATIVE PERCENT: 11.8 %
MCH RBC QN AUTO: 33.1 PG (ref 26–34)
MCHC RBC AUTO-ENTMCNC: 34.2 G/DL (ref 31–36)
MCV RBC AUTO: 96.7 FL (ref 80–100)
MONOCYTES ABSOLUTE: 0.5 K/UL (ref 0–1.3)
MONOCYTES RELATIVE PERCENT: 6 %
NEUTROPHILS ABSOLUTE: 6.8 K/UL (ref 1.7–7.7)
NEUTROPHILS RELATIVE PERCENT: 78.9 %
PDW BLD-RTO: 13.6 % (ref 12.4–15.4)
PLATELET # BLD: 142 K/UL (ref 135–450)
PMV BLD AUTO: 10.1 FL (ref 5–10.5)
POTASSIUM SERPL-SCNC: 5.6 MMOL/L (ref 3.5–5.1)
RBC # BLD: 4.57 M/UL (ref 4.2–5.9)
SODIUM BLD-SCNC: 140 MMOL/L (ref 136–145)
TOTAL PROTEIN: 7.7 G/DL (ref 6.4–8.2)
TRIGL SERPL-MCNC: 135 MG/DL (ref 0–150)
VLDLC SERPL CALC-MCNC: 27 MG/DL
WBC # BLD: 8.6 K/UL (ref 4–11)

## 2021-05-12 PROCEDURE — 1123F ACP DISCUSS/DSCN MKR DOCD: CPT | Performed by: FAMILY MEDICINE

## 2021-05-12 PROCEDURE — 99214 OFFICE O/P EST MOD 30 MIN: CPT | Performed by: FAMILY MEDICINE

## 2021-05-12 PROCEDURE — G8427 DOCREV CUR MEDS BY ELIG CLIN: HCPCS | Performed by: FAMILY MEDICINE

## 2021-05-12 PROCEDURE — 1036F TOBACCO NON-USER: CPT | Performed by: FAMILY MEDICINE

## 2021-05-12 PROCEDURE — 4040F PNEUMOC VAC/ADMIN/RCVD: CPT | Performed by: FAMILY MEDICINE

## 2021-05-12 PROCEDURE — G8926 SPIRO NO PERF OR DOC: HCPCS | Performed by: FAMILY MEDICINE

## 2021-05-12 PROCEDURE — G0438 PPPS, INITIAL VISIT: HCPCS | Performed by: FAMILY MEDICINE

## 2021-05-12 PROCEDURE — G8420 CALC BMI NORM PARAMETERS: HCPCS | Performed by: FAMILY MEDICINE

## 2021-05-12 PROCEDURE — 3023F SPIROM DOC REV: CPT | Performed by: FAMILY MEDICINE

## 2021-05-12 RX ORDER — LANOLIN ALCOHOL/MO/W.PET/CERES
3 CREAM (GRAM) TOPICAL DAILY
COMMUNITY

## 2021-05-12 RX ORDER — PROMETHAZINE HYDROCHLORIDE 25 MG/1
25 TABLET ORAL EVERY 6 HOURS PRN
COMMUNITY

## 2021-05-12 SDOH — ECONOMIC STABILITY: INCOME INSECURITY: HOW HARD IS IT FOR YOU TO PAY FOR THE VERY BASICS LIKE FOOD, HOUSING, MEDICAL CARE, AND HEATING?: NOT HARD AT ALL

## 2021-05-12 SDOH — ECONOMIC STABILITY: TRANSPORTATION INSECURITY
IN THE PAST 12 MONTHS, HAS THE LACK OF TRANSPORTATION KEPT YOU FROM MEDICAL APPOINTMENTS OR FROM GETTING MEDICATIONS?: NO

## 2021-05-12 SDOH — ECONOMIC STABILITY: FOOD INSECURITY: WITHIN THE PAST 12 MONTHS, THE FOOD YOU BOUGHT JUST DIDN'T LAST AND YOU DIDN'T HAVE MONEY TO GET MORE.: NEVER TRUE

## 2021-05-12 ASSESSMENT — PATIENT HEALTH QUESTIONNAIRE - PHQ9
2. FEELING DOWN, DEPRESSED OR HOPELESS: 0
1. LITTLE INTEREST OR PLEASURE IN DOING THINGS: 0
SUM OF ALL RESPONSES TO PHQ QUESTIONS 1-9: 0
SUM OF ALL RESPONSES TO PHQ QUESTIONS 1-9: 0

## 2021-05-12 NOTE — PATIENT INSTRUCTIONS
Personalized Preventive Plan for Mariana Espinoza - 5/12/2021  Medicare offers a range of preventive health benefits. Some of the tests and screenings are paid in full while other may be subject to a deductible, co-insurance, and/or copay. Some of these benefits include a comprehensive review of your medical history including lifestyle, illnesses that may run in your family, and various assessments and screenings as appropriate. After reviewing your medical record and screening and assessments performed today your provider may have ordered immunizations, labs, imaging, and/or referrals for you. A list of these orders (if applicable) as well as your Preventive Care list are included within your After Visit Summary for your review. Other Preventive Recommendations:    · A preventive eye exam performed by an eye specialist is recommended every 1-2 years to screen for glaucoma; cataracts, macular degeneration, and other eye disorders. · A preventive dental visit is recommended every 6 months. · Try to get at least 150 minutes of exercise per week or 10,000 steps per day on a pedometer . · Order or download the FREE \"Exercise & Physical Activity: Your Everyday Guide\" from [employer]. Call 4-171.614.3644 or search [employer] online. · You need 3619-6551 mg of calcium and 7207-1445 IU of vitamin D per day. It is possible to meet your calcium requirement with diet alone, but a vitamin D supplement is usually necessary to meet this goal.  · When exposed to the sun, use a sunscreen that protects against both UVA and UVB radiation with an SPF of 30 or greater. Reapply every 2 to 3 hours or after sweating, drying off with a towel, or swimming. · Always wear a seat belt when traveling in a car. Always wear a helmet when riding a bicycle or motorcycle.

## 2021-05-12 NOTE — PROGRESS NOTES
5/12/2021    Dariela Combs    Chief Complaint   Patient presents with    6 Month Follow-Up     Presenting for 6 month f/u. He brought BP and BS logs. HPI    Stan Beard is a 79 y.o. male who presents today with follow-up. 5/12/2021    Dariela Combs    Chief Complaint   Patient presents with    6 Month Follow-Up     Presenting for 6 month f/u. He brought BP and BS logs. HPI    Stan Beard is a 79 y.o. male who presents today with follow-up.    -Patient notes that his dementia is variable. He has good days and bad days. I certainly have not seen any rapid progression at all of his concentration. Patient denies chest pain or shortness of breath currently. Patient complains of generalized fatigue without chest pain or shortness of breath    Patient notes anorexia although he has a good height for weight range currently and small change.     Patient's blood sugars in the morning currently ranged from 1 23-1 40 without hypoglycemia      REVIEW OF SYSTEMS    Constitutional:  Denies fever, chills, weight loss or weakness  Eyes:  no photophobia or discharge  ENT:  no sore throat or ear pain  Cardiovascular:  Denies chest pain, palpitations or swelling  Respiratory:  Denies cough or shortness of breath  GI:  no abdominal pain, nausea, vomiting, or diarrhea  Musculoskeletal:  no back pain  Skin:  No rashes  Neurologic:  no headache, focal weakness, or sensory changes  Endocrine:  no polyuria or polydipsia      PAST MEDICAL HISTORY  Past Medical History:   Diagnosis Date    AAA (abdominal aortic aneurysm) without rupture (HCC)     Arthritis     CAD (coronary artery disease)     Cerebral aneurysm, nonruptured     Chronic low back pain     Chronic viral hepatitis B without coma and with delta agent ([facility] 11/12/2020    Diabetes mellitus (HCC)     GERD (gastroesophageal reflux disease)     H/O 24 hour EKG monitoring 12/7/2013    sinus rhythm, infreq PVCs    H/O cardiovascular stress test 10/30/14, =EF 55%; ; 10/98; 10/97; ;     Hx of fracture of vertebral column 2012    4 vertebra fractures    Hx of tilt table evaluation     marked vasovagal response with a drop of the BP without any increment in his heartrate.  Hx of transesophageal echocardiography (JONN) for monitoring 11/10    11/10=mild aortic regurg., probably atrial septal aneurysm, no other sig.  abnormality noted    Hyperhomocysteinemia ([address]     Hyperlipidemia     Laceration of head 2012    6 staples    Lewy body dementia without behavioral disturbance ([address] 2020    Neurogenic syncope 2001    Obstructive sleep apnea     Parkinson disease ([facility] 2019    Recurrent sinusitis     S/P dilatation of esophageal stricture     TIA (transient ischemic attack)     x2    Unspecified cerebral artery occlusion with cerebral infarction     Vertebral fracture        FAMILY HISTORY  Family History   Problem Relation Age of Onset    Arthritis Mother     Diabetes Mother     High Cholesterol Mother     Other Father         Brain aneurysm @37    Asthma Son     Seizures Son        SOCIAL HISTORY  Social History     Socioeconomic History    Marital status:      Spouse name: None    Number of children: 2    Years of education: None    Highest education level: None   Occupational History    None   Social Needs    Financial resource strain: Not hard at all   [place] insecurity     Worry: Never true     Inability: Never true    Transportation needs     Medical: No     Non-medical: No   Tobacco Use    Smoking status: Former Smoker     Packs/day: 2.00     Years: 31.00     Pack years: 62.00     Start date: 5/10/1943     Quit date: 1974     Years since quittin.3    Smokeless tobacco: Never Used    Tobacco comment: reviewed 10/13/2015   Substance and Sexual Activity    Alcohol use: No     Alcohol/week: 0.0 standard drinks    Drug use: No    Sexual activity: Yes     Partners: Female Comment:     Lifestyle    Physical activity     Days per week: None     Minutes per session: None    Stress: None   Relationships    Social connections     Talks on phone: None     Gets together: None     Attends Confucianism service: None     Active member of club or organization: None     Attends meetings of clubs or organizations: None     Relationship status: None    Intimate partner violence     Fear of current or ex partner: None     Emotionally abused: None     Physically abused: None     Forced sexual activity: None   Other Topics Concern    None   Social History Narrative    None        SURGICAL HISTORY  Past Surgical History:   Procedure Laterality Date    ABDOMINAL AORTIC ANEURYSM REPAIR  9/23/09    3.6x2.7cm.  APPENDECTOMY      CARPAL TUNNEL RELEASE      right    CARPAL TUNNEL RELEASE Left     COLONOSCOPY      ELBOW SURGERY      left x4    ENDOSCOPY, COLON, DIAGNOSTIC      FOOT SURGERY      left    HERNIA REPAIR      INGUINAL HERNIA REPAIR Right     PTCA  5/11;4/98;3/94    05/11= 3.5x28 taxus stent of RCA and distal RCA with lesion 30-40%to be closely monitored. 04/98=mild LV dysfunction, mild CAD; 03/94    PTCA  11/2014    Stent to the LAD    PTCA      SINUS SURGERY      x4    TOTAL KNEE ARTHROPLASTY Right 10/2017    UPPER GASTROINTESTINAL ENDOSCOPY      twice       CURRENT MEDICATIONS  Current Outpatient Medications   Medication Sig Dispense Refill    Plecanatide (TRULANCE PO) Take by mouth otc      melatonin 3 MG TABS tablet Take 3 mg by mouth daily      promethazine (PHENERGAN) 25 MG tablet Take 25 mg by mouth every 6 hours as needed for Nausea      atorvastatin (LIPITOR) 80 MG tablet TAKE ONE TABLET BY MOUTH DAILY 30 tablet 3    amLODIPine (NORVASC) 5 MG tablet Take 5 mg by mouth daily      gabapentin (NEURONTIN) 400 MG capsule gabapentin 400 mg capsule   TAKE 1 CAPSULE BY MOUTH AT BEDTIME      carbidopa-levodopa (SINEMET CR)  MG per extended release tablet Take 1 tablet by mouth 2 times daily      DULoxetine (CYMBALTA) 60 MG extended release capsule Take 60 mg by mouth daily      omeprazole (PRILOSEC) 20 MG delayed release capsule Take 1 capsule by mouth 2 times daily 60 capsule 5    ipratropium (ATROVENT) 0.06 % nasal spray 2 sprays by Each Nostril route 4 times daily 1 Bottle 5    metFORMIN (GLUCOPHAGE) 500 MG tablet 1 pill daily with meals x7 days, then 1 twice daily with meals 60 tablet 5    sodium chloride 0.9 % irrigation RINSE SINUSES TWO TIMES A DAY AFTER MIXING 1 TUBE OF BACTROBAN OINTMENT INTO BOTTLE OF SOLUTION 6000 mL 5    montelukast (SINGULAIR) 10 MG tablet Take 1 tablet by mouth nightly 30 tablet 5    clopidogrel (PLAVIX) 75 MG tablet Take 1 tablet by mouth every other day 15 tablet 5    ipratropium-albuterol (DUONEB) 0.5-2.5 (3) MG/3ML SOLN nebulizer solution USE 1 VIAL PER NEBULIZER FOUR TIMES A DAY AS NEEDED. 360 mL 0    mupirocin (BACTROBAN) 2 % ointment APPLY TO AFFECTED AREA(S) TWO TIMES A DAY 60 g 2    donepezil (ARICEPT) 10 MG tablet Take 1 tablet by mouth daily 30 tablet 5    nitroGLYCERIN (NITROSTAT) 0.4 MG SL tablet Place 1 tablet under the tongue every 5 minutes as needed for Chest pain 25 tablet 1    albuterol (PROVENTIL) (2.5 MG/3ML) 0.083% nebulizer solution Take 2.5 mg by nebulization 4 times daily as needed      LANCETS MICRO THIN 33G MISC 1 Units by Does not apply route daily      blood glucose test strips (ASCENSIA AUTODISC VI;ONE TOUCH ULTRA TEST VI) strip 1 each by In Vitro route daily as needed As needed.  budesonide-formoterol (SYMBICORT) 80-4.5 MCG/ACT AERO Inhale 2 puffs into the lungs 2 times daily Rinse Mouth after use.       tiotropium (SPIRIVA RESPIMAT) 2.5 MCG/ACT AERS inhaler Inhale 2 puffs into the lungs 2 times daily       vitamin D (CHOLECALCIFEROL) 1000 UNIT TABS tablet Take 1,000 Units by mouth daily      aspirin 81 MG tablet Take 81 mg by mouth every other day       oxyCODONE HCl (OXY-IR) 10 MG immediate release tablet Take 10 mg by mouth 4 times daily as needed for Pain.  Loratadine 10 MG CAPS Take 10 mg by mouth daily. No current facility-administered medications for this visit. ALLERGIES  Allergies   Allergen Reactions    Nsaids     Tramadol Hcl      Confusion       PHYSICAL EXAM  /78   Pulse 90   Resp 16   Ht 5' 10\" (1.778 m)   Wt 173 lb (78.5 kg)   SpO2 95%   BMI 24.82 kg/m²     ASSESSMENT & PLAN    1. Coronary artery disease involving native coronary artery of native heart with unstable angina pectoris ([facility]  Issue controlled. Continue meds. Refilled meds. 2. Lewy body dementia without behavioral disturbance (HCC)  Issue controlled. Continue meds. Refilled meds. 3. Chronic obstructive pulmonary disease, unspecified COPD type ([facility]  Issue controlled. Continue meds. Refilled meds. 4. Pulmonary hypertension (HCC)  Issue controlled. Continue meds. Refilled meds. 5.  Diabetes type 2  To yearly diabetic labs as well as urine microalbumin. Issue is stable check labs today. Adjust medication off of lab results. Patient billed off total time today. 30 minutes.   5 minutes spent prechart reviewing   20 minutes spent with patient   5 minutes spent creating note        Electronically signed by Ailin Isidro MD on 5/12/2021

## 2021-05-12 NOTE — PROGRESS NOTES
Medicare Annual Wellness Visit  Name: Kerline Thompson Date: 2021   MRN: [id number] Sex: Male   Age: 79 y.o. Ethnicity: Non-/Non    : 1940 Race: Dorys Han is here for Medicare AWV    Screenings for behavioral, psychosocial and functional/safety risks, and cognitive dysfunction are all negative except as indicated below. These results, as well as other patient data from the [address] form, are documented in Flowsheets linked to this Encounter. Allergies   Allergen Reactions    Nsaids     Tramadol Hcl      Confusion       Prior to Visit Medications    Medication Sig Taking?  Authorizing Provider   Plecanatide (TRULANCE PO) Take by mouth otc Yes Historical Provider, MD   melatonin 3 MG TABS tablet Take 3 mg by mouth daily Yes Historical Provider, MD   promethazine (PHENERGAN) 25 MG tablet Take 25 mg by mouth every 6 hours as needed for Nausea Yes Historical Provider, MD   atorvastatin (LIPITOR) 80 MG tablet TAKE ONE TABLET BY MOUTH DAILY Yes Aries Ochoa MD   amLODIPine (NORVASC) 5 MG tablet Take 5 mg by mouth daily Yes Historical Provider, MD   gabapentin (NEURONTIN) 400 MG capsule gabapentin 400 mg capsule   TAKE 1 CAPSULE BY MOUTH AT BEDTIME Yes Historical Provider, MD   carbidopa-levodopa (SINEMET CR)  MG per extended release tablet Take 1 tablet by mouth 2 times daily Yes Historical Provider, MD   DULoxetine (CYMBALTA) 60 MG extended release capsule Take 60 mg by mouth daily Yes Historical Provider, MD   omeprazole (PRILOSEC) 20 MG delayed release capsule Take 1 capsule by mouth 2 times daily Yes Aries Ochoa MD   ipratropium (ATROVENT) 0.06 % nasal spray 2 sprays by Each Nostril route 4 times daily Yes Aries Ochoa MD   metFORMIN (GLUCOPHAGE) 500 MG tablet 1 pill daily with meals x7 days, then 1 twice daily with meals Yes Aries Ochoa MD   sodium chloride 0.9 % irrigation RINSE SINUSES TWO TIMES A DAY AFTER MIXING 1 TUBE OF BACTROBAN OINTMENT INTO BOTTLE OF SOLUTION Yes Ailin Isidro MD   montelukast (SINGULAIR) 10 MG tablet Take 1 tablet by mouth nightly Yes Ailin Isidro MD   clopidogrel (PLAVIX) 75 MG tablet Take 1 tablet by mouth every other day Yes Ailin Isidro MD   ipratropium-albuterol (DUONEB) 0.5-2.5 (3) MG/3ML SOLN nebulizer solution USE 1 VIAL PER NEBULIZER FOUR TIMES A DAY AS NEEDED. Yes Ailin Isidro MD   mupirocin (BACTROBAN) 2 % ointment APPLY TO AFFECTED AREA(S) TWO TIMES A DAY Yes Ailin Isidro MD   donepezil (ARICEPT) 10 MG tablet Take 1 tablet by mouth daily Yes Ailin Isidro MD   nitroGLYCERIN (NITROSTAT) 0.4 MG SL tablet Place 1 tablet under the tongue every 5 minutes as needed for Chest pain Yes Ailin Isidro MD   albuterol (PROVENTIL) (2.5 MG/3ML) 0.083% nebulizer solution Take 2.5 mg by nebulization 4 times daily as needed Yes Historical Provider, MD   LANCETS MICRO THIN 33G MISC 1 Units by Does not apply route daily Yes Historical Provider, MD   blood glucose test strips (ASCENSIA AUTODISC VI;ONE TOUCH ULTRA TEST VI) strip 1 each by In Vitro route daily as needed As needed. Yes Historical Provider, MD   budesonide-formoterol (SYMBICORT) 80-4.5 MCG/ACT AERO Inhale 2 puffs into the lungs 2 times daily Rinse Mouth after use. Yes Historical Provider, MD   tiotropium (SPIRIVA RESPIMAT) 2.5 MCG/ACT AERS inhaler Inhale 2 puffs into the lungs 2 times daily  Yes Historical Provider, MD   vitamin D (CHOLECALCIFEROL) 1000 UNIT TABS tablet Take 1,000 Units by mouth daily Yes Historical Provider, MD   aspirin 81 MG tablet Take 81 mg by mouth every other day  Yes Lm Scott MD   oxyCODONE HCl (OXY-IR) 10 MG immediate release tablet Take 10 mg by mouth 4 times daily as needed for Pain. Yes Historical Provider, MD   Loratadine 10 MG CAPS Take 10 mg by mouth daily.  Yes Historical Provider, MD       Past Medical History:   Diagnosis Date    AAA (abdominal aortic aneurysm) without rupture (HCC)     Arthritis     CAD (coronary artery disease)     Cerebral aneurysm, nonruptured     Chronic low back pain     Chronic viral hepatitis B without coma and with delta agent ([facility] 11/12/2020    Diabetes mellitus ([facility]     GERD (gastroesophageal reflux disease)     H/O 24 hour EKG monitoring 12/7/2013    sinus rhythm, infreq PVCs    H/O cardiovascular stress test 10/30/14, 8/14/2013    10/30/14 Evidence of mild ischemia in the RCA. EF 64%  8/14/2013 EF58% small amount of ischemia    H/O Doppler ultrasound 09/04/2013    ABDOMINAL AORTIC US-it is noted that the max dimension is 3.24 is slightly higher than the previous one, suggest yearly abd aortic US, however this size is essentially unremarkable and is much less than 5 cm.  H/O Doppler ultrasound 10/27/14    ABD AORTIC US: small aortic aneurysm measuring 2.75cm    H/O echocardiogram 9/3/14, 11/12/2013    9/3/14 EF 60%. Mildly hypertrophic LV. Mild mitral, aortic and tricuspid insufficiencies. 11/12/13 EF55-60% mil MR, mile TR, mild pulm htn    H/O echocardiogram 10/13/15    EF 26% Normal LV systolic function. Sclerotic aortic valve which is nonstenotic.  Mod to Severe pulmonary HTN.     H/O exercise stress test 12/1/2014    normal resting ECG, no c/p, ventricular premature beats    Hiatal hernia     History of cardiac monitoring 10/14/15    Event - sinus rhythm    History of methicillin resistant staphylococcus aureus (MRSA)     Carrier of the nose 2012    History of nuclear stress test 09/01/2016    lexiscan-normal,EF64%    History of PFTs 1/9/14    History of recurrent TIAs     Hx of 24 hour EKG monitoring 8/11 08/11=NSR with physiological variation of heart rate, although mostly gloria rhythm; 04/00    Hx of cardiovascular stress test 6/12;6/11 06/12=normal perfusion EF 60%; 06/11=EF 65%; 03/11=EF 55%; 03/09=EF 64%; 09/07=EF 62%; 03/03;04/98;02/94;02/00    Hx of cardiovascular stress test 9/30/2015    lexiscan-normal,EF68%    Hx of Doppler echocardiogram 9/12    carotid 9/12=no sig. plaques seen; 08/05    Hx of Doppler ultrasound 6/08    peripheral doppler 06/08=no sig. PVD    Hx of Doppler ultrasound 9/30/15    Abdominal aortic aneurysm involving the distal abdomianal aorta with a maximum anterior posterior diameter of 3.03cm    Hx of echocardiogram 6/12;8/11 06/12=normal with EF 60%; 08/11=55%; 01/09=EF 55%; 02/04; 10/98; 10/97; 5/96; 5/95    Hx of fracture of vertebral column 9/2012    4 vertebra fractures    Hx of tilt table evaluation 07/01    marked vasovagal response with a drop of the BP without any increment in his heartrate.  Hx of transesophageal echocardiography (JONN) for monitoring 11/10    11/10=mild aortic regurg., probably atrial septal aneurysm, no other sig. abnormality noted    Hyperhomocysteinemia ([address]     Hyperlipidemia     Laceration of head 09/26/2012    6 staples    Lewy body dementia without behavioral disturbance ([address] 11/12/2020    Neurogenic syncope 2001    Obstructive sleep apnea     Parkinson disease ([address] 9/6/2019    Recurrent sinusitis     S/P dilatation of esophageal stricture     TIA (transient ischemic attack)     x2    Unspecified cerebral artery occlusion with cerebral infarction     Vertebral fracture        Past Surgical History:   Procedure Laterality Date    ABDOMINAL AORTIC ANEURYSM REPAIR  9/23/09    3.6x2.7cm.  APPENDECTOMY      CARPAL TUNNEL RELEASE      right    CARPAL TUNNEL RELEASE Left     COLONOSCOPY      ELBOW SURGERY      left x4    ENDOSCOPY, COLON, DIAGNOSTIC      FOOT SURGERY      left    HERNIA REPAIR      INGUINAL HERNIA REPAIR Right     PTCA  5/11;4/98;3/94    05/11= 3.5x28 taxus stent of RCA and distal RCA with lesion 30-40%to be closely monitored. 04/98=mild LV dysfunction, mild CAD; 03/94    PTCA  11/2014    Stent to the LAD    PTCA      SINUS SURGERY      x4    TOTAL KNEE ARTHROPLASTY Right 10/2017    UPPER GASTROINTESTINAL ENDOSCOPY      twice       Family History   Problem Relation Age of Onset    Arthritis Mother     Diabetes Mother     High Cholesterol Mother     Other Father         Brain aneurysm @37    Asthma Son     Seizures Son        CareTeam (Including outside providers/suppliers regularly involved in providing care):   Patient Care Team:  Lida Coleman MD as PCP - General  Lida Coleman MD as PCP - [facility] Empaneled Provider  Wade Arevalo MD as Consulting Physician (Cardiology)    Wt Readings from Last 3 Encounters:   05/12/21 173 lb (78.5 kg)   05/12/21 173 lb (78.5 kg)   11/12/20 167 lb 6.4 oz (75.9 kg)     Vitals:    05/12/21 1300   BP: 121/78   Pulse: 90   SpO2: 95%   Weight: 173 lb (78.5 kg)   Height: 5' 10\" (1.778 m)     Body mass index is 24.82 kg/m². Based upon direct observation of the patient, evaluation of cognition reveals recent and remote memory intact. Patient's complete Health Risk Assessment and screening values have been reviewed and are found in Flowsheets. The following problems were reviewed today and where indicated follow up appointments were made and/or referrals ordered. Positive Risk Factor Screenings with Interventions:          General Health and ACP:  General  In general, how would you say your health is?: Fair  In the past 7 days, have you experienced any of the following?  New or Increased Pain, New or Increased Fatigue, Loneliness, Social Isolation, Stress or Anger?: None of These  Do you get the social and emotional support that you need?: Yes  Do you have a Living Will?: Yes  Advance Directives     Power of [address] [place] [address] Will ACP-Advance Directive ACP-Power of     Not on File Not on File Not on File Not on File      General Health Risk Interventions:  · No Living Will: ACP documents already completed- patient asked to provide copy to the office       ADL:  ADLs  In the past 7 days, did you need help from others to perform any of the following everyday activities? Eating, dressing, grooming, bathing, toileting, or walking/balance?: None  In the past 7 days, did you need help from others to take care of any of the following? Laundry, housekeeping, banking/finances, shopping, telephone use, food preparation, transportation, or taking medications?: [employer], Laundry, Housekeeping, [employer], Shopping, Food Preparation, Taking Medications  ADL Interventions:  · Patient declines any further evaluation/treatment for this issue, stating that his wife takes care of ADLs. Personalized Preventive Plan   Current Health Maintenance Status  Immunization History   Administered Date(s) Administered    Influenza Vaccine, unspecified formulation 10/01/2018    Influenza Virus Vaccine 11/13/2013    Pneumococcal Conjugate 13-valent ([id number]) 11/25/2015    Pneumococcal Polysaccharide ([id number]) 09/23/2009    Td, unspecified formulation 09/02/2012        Health Maintenance   Topic Date Due    Hepatitis A vaccine (1 of 2 - Risk 2-dose series) Never done    Diabetic retinal exam  Never done    DTaP/Tdap/Td vaccine (1 - Tdap) 10/13/1958    Shingles Vaccine (1 of 2) Never done    Annual Wellness Visit (AWV)  Never done    A1C test (Diabetic or Prediabetic)  01/16/2021    Diabetic foot exam  07/16/2021    Lipid screen  11/09/2021    Flu vaccine  Completed    Pneumococcal 65+ years Vaccine  Completed    COVID-19 Vaccine  Completed    Hib vaccine  Aged Out    Meningococcal (ACWY) vaccine  Aged Out     Recommendations for [place] Due: see orders and patient instructions/AVS. Discussed vaccinations needed. Recommended screening schedule for the next 5-10 years is provided to the patient in written form: see Patient Instructions/AVS.    Elen MANCILLA LPN, 8/16/[birth year], performed the documented evaluation under the direct supervision of the attending physician.     Dariela Combs was evaluated through a synchronous (real-time) audio-video encounter. The patient (or guardian if applicable) is aware that this is a billable service. Verbal consent to proceed has been obtained within the past 12 months. The visit was conducted pursuant to the emergency declaration under the [address] [place] [address], [address] [place] [address] and the [employer] Act. Patient identification was verified, and a caregiver was present when appropriate. The patient was located in the [address], where the provider was credentialed to provide care. Total time spent for this encounter: Not billed by time    --Dipti Lobo LPN on 2/47/4929 at 8:15 PM    An electronic signature was used to authenticate this note. This encounter was performed under Lyla lawson MDs, direct supervision, 5/12/2021.

## 2021-05-13 LAB
CREATININE URINE: 57.2 MG/DL (ref 39–259)
ESTIMATED AVERAGE GLUCOSE: 154.2 MG/DL
HBA1C MFR BLD: 7 %
MICROALBUMIN UR-MCNC: <1.2 MG/DL
MICROALBUMIN/CREAT UR-RTO: NORMAL MG/G (ref 0–30)

## 2021-05-18 DIAGNOSIS — E87.5 HYPERKALEMIA: Primary | ICD-10-CM

## 2021-05-19 DIAGNOSIS — J44.9 CHRONIC OBSTRUCTIVE PULMONARY DISEASE, UNSPECIFIED COPD TYPE (HCC): ICD-10-CM

## 2021-05-20 RX ORDER — IPRATROPIUM BROMIDE AND ALBUTEROL SULFATE 2.5; .5 MG/3ML; MG/3ML
SOLUTION RESPIRATORY (INHALATION)
Qty: 360 ML | Refills: 0 | Status: SHIPPED | OUTPATIENT
Start: 2021-05-20 | End: 2021-07-07

## 2021-05-27 DIAGNOSIS — E87.5 HYPERKALEMIA: ICD-10-CM

## 2021-05-27 LAB
ANION GAP SERPL CALCULATED.3IONS-SCNC: 12 MMOL/L (ref 3–16)
BUN BLDV-MCNC: 19 MG/DL (ref 7–20)
CALCIUM SERPL-MCNC: 9.5 MG/DL (ref 8.3–10.6)
CHLORIDE BLD-SCNC: 99 MMOL/L (ref 99–110)
CO2: 28 MMOL/L (ref 21–32)
CREAT SERPL-MCNC: 1 MG/DL (ref 0.8–1.3)
GFR AFRICAN AMERICAN: >60
GFR NON-AFRICAN AMERICAN: >60
GLUCOSE BLD-MCNC: 179 MG/DL (ref 70–99)
POTASSIUM SERPL-SCNC: 4.6 MMOL/L (ref 3.5–5.1)
SODIUM BLD-SCNC: 139 MMOL/L (ref 136–145)

## 2021-07-01 ENCOUNTER — TELEPHONE (OUTPATIENT)
Dept: FAMILY MEDICINE CLINIC | Age: 81
End: 2021-07-01

## 2021-07-01 NOTE — TELEPHONE ENCOUNTER
Spoke with pt informed recieived cpap order supply form from  [employer] [employer]. Dr Nitin Gaona needs settings for the cpap.  Pt states disregard that form, not sure who that company is, pt states he will call his usual company and let us know if he needs anything

## 2021-07-06 DIAGNOSIS — J44.9 CHRONIC OBSTRUCTIVE PULMONARY DISEASE, UNSPECIFIED COPD TYPE (HCC): ICD-10-CM

## 2021-07-07 RX ORDER — IPRATROPIUM BROMIDE AND ALBUTEROL SULFATE 2.5; .5 MG/3ML; MG/3ML
SOLUTION RESPIRATORY (INHALATION)
Qty: 360 ML | Refills: 0 | Status: SHIPPED | OUTPATIENT
Start: 2021-07-07

## 2021-07-13 ENCOUNTER — TELEPHONE (OUTPATIENT)
Dept: FAMILY MEDICINE CLINIC | Age: 81
End: 2021-07-13

## 2021-07-13 NOTE — TELEPHONE ENCOUNTER
Spoke with pt, informed received dme fo cpap supplies from [employer].  Pt stated \"thow that in the trash I d do not use that company\"

## 2021-07-26 DIAGNOSIS — E11.8 TYPE 2 DIABETES MELLITUS WITH COMPLICATION, WITHOUT LONG-TERM CURRENT USE OF INSULIN (HCC): ICD-10-CM

## 2021-08-19 RX ORDER — ATORVASTATIN CALCIUM 80 MG/1
TABLET, FILM COATED ORAL
Qty: 30 TABLET | Refills: 0 | Status: SHIPPED | OUTPATIENT
Start: 2021-08-19 | End: 2021-09-13 | Stop reason: SDUPTHER

## 2021-09-13 ENCOUNTER — OFFICE VISIT (OUTPATIENT)
Dept: FAMILY MEDICINE CLINIC | Age: 81
End: 2021-09-13
Payer: MEDICARE

## 2021-09-13 VITALS
BODY MASS INDEX: 26.2 KG/M2 | SYSTOLIC BLOOD PRESSURE: 120 MMHG | HEART RATE: 56 BPM | WEIGHT: 183 LBS | HEIGHT: 70 IN | DIASTOLIC BLOOD PRESSURE: 68 MMHG

## 2021-09-13 DIAGNOSIS — F02.80 LEWY BODY DEMENTIA WITHOUT BEHAVIORAL DISTURBANCE (HCC): Primary | ICD-10-CM

## 2021-09-13 DIAGNOSIS — T48.5X5A RHINITIS MEDICAMENTOSA: ICD-10-CM

## 2021-09-13 DIAGNOSIS — G20 PARKINSON DISEASE (HCC): ICD-10-CM

## 2021-09-13 DIAGNOSIS — J44.9 CHRONIC OBSTRUCTIVE PULMONARY DISEASE, UNSPECIFIED COPD TYPE (HCC): ICD-10-CM

## 2021-09-13 DIAGNOSIS — N41.0 ACUTE PROSTATITIS WITHOUT HEMATURIA: ICD-10-CM

## 2021-09-13 DIAGNOSIS — I71.40 AAA (ABDOMINAL AORTIC ANEURYSM) WITHOUT RUPTURE: ICD-10-CM

## 2021-09-13 DIAGNOSIS — B18.0 CHRONIC VIRAL HEPATITIS B WITHOUT COMA AND WITH DELTA AGENT (HCC): ICD-10-CM

## 2021-09-13 DIAGNOSIS — K21.9 GASTROESOPHAGEAL REFLUX DISEASE WITHOUT ESOPHAGITIS: ICD-10-CM

## 2021-09-13 DIAGNOSIS — I25.110 CORONARY ARTERY DISEASE INVOLVING NATIVE CORONARY ARTERY OF NATIVE HEART WITH UNSTABLE ANGINA PECTORIS (HCC): ICD-10-CM

## 2021-09-13 DIAGNOSIS — J31.0 RHINITIS MEDICAMENTOSA: ICD-10-CM

## 2021-09-13 DIAGNOSIS — Z23 FLU VACCINE NEED: ICD-10-CM

## 2021-09-13 DIAGNOSIS — G31.83 LEWY BODY DEMENTIA WITHOUT BEHAVIORAL DISTURBANCE (HCC): Primary | ICD-10-CM

## 2021-09-13 DIAGNOSIS — E11.8 TYPE 2 DIABETES MELLITUS WITH COMPLICATION, WITHOUT LONG-TERM CURRENT USE OF INSULIN (HCC): ICD-10-CM

## 2021-09-13 DIAGNOSIS — E72.11 HYPERHOMOCYSTEINEMIA (HCC): ICD-10-CM

## 2021-09-13 PROCEDURE — 3051F HG A1C>EQUAL 7.0%<8.0%: CPT | Performed by: FAMILY MEDICINE

## 2021-09-13 PROCEDURE — G8926 SPIRO NO PERF OR DOC: HCPCS | Performed by: FAMILY MEDICINE

## 2021-09-13 PROCEDURE — G0008 ADMIN INFLUENZA VIRUS VAC: HCPCS | Performed by: FAMILY MEDICINE

## 2021-09-13 PROCEDURE — G8417 CALC BMI ABV UP PARAM F/U: HCPCS | Performed by: FAMILY MEDICINE

## 2021-09-13 PROCEDURE — 3023F SPIROM DOC REV: CPT | Performed by: FAMILY MEDICINE

## 2021-09-13 PROCEDURE — 1123F ACP DISCUSS/DSCN MKR DOCD: CPT | Performed by: FAMILY MEDICINE

## 2021-09-13 PROCEDURE — 4040F PNEUMOC VAC/ADMIN/RCVD: CPT | Performed by: FAMILY MEDICINE

## 2021-09-13 PROCEDURE — 99214 OFFICE O/P EST MOD 30 MIN: CPT | Performed by: FAMILY MEDICINE

## 2021-09-13 PROCEDURE — 1036F TOBACCO NON-USER: CPT | Performed by: FAMILY MEDICINE

## 2021-09-13 PROCEDURE — 90694 VACC AIIV4 NO PRSRV 0.5ML IM: CPT | Performed by: FAMILY MEDICINE

## 2021-09-13 PROCEDURE — G8427 DOCREV CUR MEDS BY ELIG CLIN: HCPCS | Performed by: FAMILY MEDICINE

## 2021-09-13 RX ORDER — MONTELUKAST SODIUM 10 MG/1
TABLET ORAL
Qty: 30 TABLET | Refills: 5 | Status: SHIPPED | OUTPATIENT
Start: 2021-09-13 | End: 2022-05-18

## 2021-09-13 RX ORDER — CARBIDOPA AND LEVODOPA 25; 100 MG/1; MG/1
1 TABLET, EXTENDED RELEASE ORAL 2 TIMES DAILY
Qty: 60 TABLET | Refills: 11 | Status: SHIPPED | OUTPATIENT
Start: 2021-09-13 | End: 2022-09-13

## 2021-09-13 RX ORDER — DULOXETIN HYDROCHLORIDE 60 MG/1
60 CAPSULE, DELAYED RELEASE ORAL DAILY
Qty: 30 CAPSULE | Refills: 11 | Status: SHIPPED | OUTPATIENT
Start: 2021-09-13 | End: 2022-09-13

## 2021-09-13 RX ORDER — IPRATROPIUM BROMIDE 42 UG/1
2 SPRAY, METERED NASAL 3 TIMES DAILY PRN
Qty: 1 EACH | Refills: 5 | Status: SHIPPED | OUTPATIENT
Start: 2021-09-13 | End: 2022-02-14 | Stop reason: SDUPTHER

## 2021-09-13 RX ORDER — DONEPEZIL HYDROCHLORIDE 10 MG/1
10 TABLET, FILM COATED ORAL DAILY
Qty: 30 TABLET | Refills: 5 | Status: SHIPPED | OUTPATIENT
Start: 2021-09-13 | End: 2022-02-14 | Stop reason: SDUPTHER

## 2021-09-13 RX ORDER — AMLODIPINE BESYLATE 5 MG/1
5 TABLET ORAL DAILY
Qty: 30 TABLET | Refills: 11 | Status: SHIPPED | OUTPATIENT
Start: 2021-09-13 | End: 2022-09-13

## 2021-09-13 RX ORDER — OMEPRAZOLE 20 MG/1
20 CAPSULE, DELAYED RELEASE ORAL 2 TIMES DAILY
Qty: 60 CAPSULE | Refills: 5 | Status: SHIPPED | OUTPATIENT
Start: 2021-09-13 | End: 2022-02-14 | Stop reason: SDUPTHER

## 2021-09-13 RX ORDER — ATORVASTATIN CALCIUM 80 MG/1
TABLET, FILM COATED ORAL
Qty: 30 TABLET | Refills: 5 | Status: SHIPPED | OUTPATIENT
Start: 2021-09-13 | End: 2022-03-23

## 2021-09-13 RX ORDER — SULFAMETHOXAZOLE AND TRIMETHOPRIM 800; 160 MG/1; MG/1
1 TABLET ORAL 2 TIMES DAILY
Qty: 42 TABLET | Refills: 0 | Status: SHIPPED | OUTPATIENT
Start: 2021-09-13 | End: 2021-10-04

## 2021-09-13 NOTE — PROGRESS NOTES
Vaccine Information Sheet, \"Influenza - Inactivated\"  given to Rip Donnelly, or parent/legal guardian of  Rip Donnelly and verbalized understanding. Patient responses:    Have you ever had a reaction to a flu vaccine? No  Do you have any current illness? No  Have you ever had [place] Syndrome? No  Do you have a serious allergy to any of the follow: Neomycin, Polymyxin, Thimerosal, eggs or egg products? No    Flu vaccine given per order. Please see immunization tab. Risks and benefits explained. Current VIS given.

## 2021-09-13 NOTE — PROGRESS NOTES
9/13/2021    [facility]    Chief Complaint   Patient presents with    Other     4 mth f/u    Hyperglycemia       HPI    Shayla Ayala is a 79 y.o. male who presents today with follow-up. Shayla Ayala notes that his sugars have been elevated and variable for the last 6 weeks. He has been waking not feeling well. In review of systems he does note that his urine has at times not been clear. He has chronic urine frequency. Patient denies chest pain patient feels his movements are stable. He is seeing neurology. REVIEW OF SYSTEMS    Constitutional:  Denies fever, chills, weight loss or weakness  Eyes:  no photophobia or discharge  ENT:  no sore throat or ear pain  Cardiovascular:  Denies chest pain, palpitations or swelling  Respiratory:  Denies cough or shortness of breath  GI:  no abdominal pain, nausea, vomiting, or diarrhea  Musculoskeletal:  no back pain  Skin:  No rashes  Neurologic:  no headache, focal weakness, or sensory changes  Endocrine:  no polyuria or polydipsia      PAST MEDICAL HISTORY  Past Medical History:   Diagnosis Date    AAA (abdominal aortic aneurysm) without rupture (HCC)     Arthritis     CAD (coronary artery disease)     Cerebral aneurysm, nonruptured     Chronic low back pain     Chronic viral hepatitis B without coma and with delta agent ([facility] 11/12/2020    Diabetes mellitus ([facility]     GERD (gastroesophageal reflux disease)     H/O 24 hour EKG monitoring 12/7/2013    sinus rhythm, infreq PVCs    H/O cardiovascular stress test 10/30/14, 8/14/2013    10/30/14 Evidence of mild ischemia in the RCA. EF 64%  8/14/2013 EF58% small amount of ischemia    H/O Doppler ultrasound 09/04/2013    ABDOMINAL AORTIC US-it is noted that the max dimension is 3.24 is slightly higher than the previous one, suggest yearly abd aortic US, however this size is essentially unremarkable and is much less than 5 cm.     H/O Doppler ultrasound 10/27/14    ABD AORTIC US: small aortic aneurysm measuring 2.75cm    H/O echocardiogram 9/3/14, 11/12/2013    9/3/14 EF 60%. Mildly hypertrophic LV. Mild mitral, aortic and tricuspid insufficiencies. 11/12/13 EF55-60% mil MR, mile TR, mild pulm htn    H/O echocardiogram 10/13/15    EF 57% Normal LV systolic function. Sclerotic aortic valve which is nonstenotic. Mod to Severe pulmonary HTN.     H/O exercise stress test 12/1/2014    normal resting ECG, no c/p, ventricular premature beats    Hiatal hernia     History of cardiac monitoring 10/14/15    Event - sinus rhythm    History of methicillin resistant staphylococcus aureus (MRSA)     Carrier of the nose 2012    History of nuclear stress test 09/01/2016    lexiscan-normal,EF64%    History of PFTs 1/9/14    History of recurrent TIAs     Hx of 24 hour EKG monitoring 8/11 08/11=NSR with physiological variation of heart rate, although mostly gloria rhythm; 04/00    Hx of cardiovascular stress test 6/12;6/11 06/12=normal perfusion EF 60%; 06/11=EF 65%; 03/11=EF 55%; 03/09=EF 64%; 09/07=EF 62%; 03/03;04/98;02/94;02/00    Hx of cardiovascular stress test 9/30/2015    lexiscan-normal,EF68%    Hx of Doppler echocardiogram 9/12    carotid 9/12=no sig. plaques seen; 08/05    Hx of Doppler ultrasound 6/08    peripheral doppler 06/08=no sig. PVD    Hx of Doppler ultrasound 9/30/15    Abdominal aortic aneurysm involving the distal abdomianal aorta with a maximum anterior posterior diameter of 3.03cm    Hx of echocardiogram 6/12;8/11 06/12=normal with EF 60%; 08/11=55%; 01/09=EF 55%; 02/04; 10/98; 10/97; 5/96; 5/95    Hx of fracture of vertebral column 9/2012    4 vertebra fractures    Hx of tilt table evaluation 07/01    marked vasovagal response with a drop of the BP without any increment in his heartrate.  Hx of transesophageal echocardiography (JONN) for monitoring 11/10    11/10=mild aortic regurg., probably atrial septal aneurysm, no other sig.  abnormality noted    Hyperhomocysteinemia ([address]     Hyperlipidemia     Laceration of head 2012    6 staples    Lewy body dementia without behavioral disturbance ([facility] 2020    Neurogenic syncope 2001    Obstructive sleep apnea     Parkinson disease ([facility] 2019    Recurrent sinusitis     S/P dilatation of esophageal stricture     TIA (transient ischemic attack)     x2    Unspecified cerebral artery occlusion with cerebral infarction     Vertebral fracture        FAMILY HISTORY  Family History   Problem Relation Age of Onset    Arthritis Mother     Diabetes Mother     High Cholesterol Mother     Other Father         Brain aneurysm @37    Asthma Son     Seizures Son        SOCIAL HISTORY  Social History     Socioeconomic History    Marital status:      Spouse name: Not on file    Number of children: 2    Years of education: Not on file    Highest education level: Not on file   Occupational History    Not on file   Tobacco Use    Smoking status: Former Smoker     Packs/day: 2.00     Years: 31.00     Pack years: 62.00     Start date: 5/10/1943     Quit date: 1974     Years since quittin.7    Smokeless tobacco: Never Used    Tobacco comment: reviewed 10/13/2015   Vaping Use    Vaping Use: Never used   Substance and Sexual Activity    Alcohol use: No     Alcohol/week: 0.0 standard drinks    Drug use: No    Sexual activity: Yes     Partners: Female     Comment:     Other Topics Concern    Not on file   Social History Narrative    Not on file     Social Determinants of Health     Financial Resource Strain: Low Risk     Difficulty of Paying Living Expenses: Not hard at all   Food Insecurity: No Food Insecurity    Worried About Running Out of Food in the Last Year: Never true    Waqas of Food in the Last Year: Never true   Transportation Needs: No Transportation Needs    Lack of Transportation (Medical): No    Lack of Transportation (Non-Medical):  No   Physical Activity:     Days of Exercise per Week:  Minutes of Exercise per Session:    Stress:     Feeling of Stress :    Social Connections:     Frequency of Communication with Friends and Family:     Frequency of Social Gatherings with Friends and Family:     Attends Orthodoxy Services:     Active Member of Clubs or Organizations:     Attends Club or Organization Meetings:     Marital Status:    Intimate Partner Violence:     Fear of Current or Ex-Partner:     Emotionally Abused:     Physically Abused:     Sexually Abused:         SURGICAL HISTORY  Past Surgical History:   Procedure Laterality Date    ABDOMINAL AORTIC ANEURYSM REPAIR  9/23/09    3.6x2.7cm.  APPENDECTOMY      CARPAL TUNNEL RELEASE      right    CARPAL TUNNEL RELEASE Left     COLONOSCOPY      ELBOW SURGERY      left x4    ENDOSCOPY, COLON, DIAGNOSTIC      FOOT SURGERY      left    HERNIA REPAIR      INGUINAL HERNIA REPAIR Right     PTCA  5/11;4/98;3/94    05/11= 3.5x28 taxus stent of RCA and distal RCA with lesion 30-40%to be closely monitored. 04/98=mild LV dysfunction, mild CAD; 03/94    PTCA  11/2014    Stent to the LAD    PTCA      SINUS SURGERY      x4    TOTAL KNEE ARTHROPLASTY Right 10/2017    UPPER GASTROINTESTINAL ENDOSCOPY      twice       CURRENT MEDICATIONS  Current Outpatient Medications   Medication Sig Dispense Refill    atorvastatin (LIPITOR) 80 MG tablet TAKE ONE TABLET BY MOUTH DAILY 30 tablet 5    metFORMIN (GLUCOPHAGE) 500 MG tablet TWICE DAILY WITH MEALS 60 tablet 5    montelukast (SINGULAIR) 10 MG tablet TAKE 1 TABLET BY MOUTH EVERY DAY AT NIGHT 30 tablet 5    amLODIPine (NORVASC) 5 MG tablet Take 1 tablet by mouth daily 30 tablet 11    carbidopa-levodopa (SINEMET CR)  MG per extended release tablet Take 1 tablet by mouth 2 times daily 60 tablet 11    DULoxetine (CYMBALTA) 60 MG extended release capsule Take 1 capsule by mouth daily 30 capsule 11    omeprazole (PRILOSEC) 20 MG delayed release capsule Take 1 capsule by mouth 2 times daily 60 capsule 5    ipratropium (ATROVENT) 0.06 % nasal spray 2 sprays by Each Nostril route 3 times daily as needed for Rhinitis 1 each 5    donepezil (ARICEPT) 10 MG tablet Take 1 tablet by mouth daily 30 tablet 5    sulfamethoxazole-trimethoprim (BACTRIM DS;SEPTRA DS) 800-160 MG per tablet Take 1 tablet by mouth 2 times daily for 21 days 42 tablet 0    ipratropium-albuterol (DUONEB) 0.5-2.5 (3) MG/3ML SOLN nebulizer solution USE 1 VIAL PER NEBULIZER FOUR TIMES A DAY AS NEEDED. 360 mL 0    melatonin 3 MG TABS tablet Take 3 mg by mouth daily      promethazine (PHENERGAN) 25 MG tablet Take 25 mg by mouth every 6 hours as needed for Nausea      gabapentin (NEURONTIN) 400 MG capsule gabapentin 400 mg capsule   TAKE 1 CAPSULE BY MOUTH AT BEDTIME      sodium chloride 0.9 % irrigation RINSE SINUSES TWO TIMES A DAY AFTER MIXING 1 TUBE OF BACTROBAN OINTMENT INTO BOTTLE OF SOLUTION 6000 mL 5    clopidogrel (PLAVIX) 75 MG tablet Take 1 tablet by mouth every other day 15 tablet 5    mupirocin (BACTROBAN) 2 % ointment APPLY TO AFFECTED AREA(S) TWO TIMES A DAY 60 g 2    nitroGLYCERIN (NITROSTAT) 0.4 MG SL tablet Place 1 tablet under the tongue every 5 minutes as needed for Chest pain 25 tablet 1    albuterol (PROVENTIL) (2.5 MG/3ML) 0.083% nebulizer solution Take 2.5 mg by nebulization 4 times daily as needed      LANCETS MICRO THIN 33G MISC 1 Units by Does not apply route daily      blood glucose test strips (ASCENSIA AUTODISC VI;ONE TOUCH ULTRA TEST VI) strip 1 each by In Vitro route daily as needed As needed.  budesonide-formoterol (SYMBICORT) 80-4.5 MCG/ACT AERO Inhale 2 puffs into the lungs 2 times daily Rinse Mouth after use.       tiotropium (SPIRIVA RESPIMAT) 2.5 MCG/ACT AERS inhaler Inhale 2 puffs into the lungs 2 times daily       vitamin D (CHOLECALCIFEROL) 1000 UNIT TABS tablet Take 1,000 Units by mouth daily      aspirin 81 MG tablet Take 81 mg by mouth every other day       oxyCODONE HCl (OXY-IR) 10 MG immediate release tablet Take 10 mg by mouth 4 times daily as needed for Pain.  Loratadine 10 MG CAPS Take 10 mg by mouth daily. No current facility-administered medications for this visit. ALLERGIES  Allergies   Allergen Reactions    Nsaids     Tramadol Hcl      Confusion       PHYSICAL EXAM  /68   Pulse 56   Ht 5' 10\" (1.778 m)   Wt 183 lb (83 kg)   BMI 26.26 kg/m²     ASSESSMENT & PLAN    1. Acute prostatitis without hematuria  Presumptive treatment for acute prostatitis due to abnormal urination and elevated sugars. Rule out UTI. - sulfamethoxazole-trimethoprim (BACTRIM DS;SEPTRA DS) 800-160 MG per tablet; Take 1 tablet by mouth 2 times daily for 21 days  Dispense: 42 tablet; Refill: 0  - Culture, Urine; Future    2. Type 2 diabetes mellitus with complication, without long-term current use of insulin (HCC)  Issue is stable check labs today. Adjust medication off of lab results. - metFORMIN (GLUCOPHAGE) 500 MG tablet; TWICE DAILY WITH MEALS  Dispense: 60 tablet; Refill: 5  - Hemoglobin A1C; Future    3. AAA (abdominal aortic aneurysm) without rupture (HCC)  6 months ago patient's 6-month ultrasound showed no change to improvement. Max diameter 3.4 cm    4. Hyperhomocysteinemia (HCC)  Issue controlled. Continue meds. Refilled meds. 5. Chronic viral hepatitis B without coma and with delta agent (HCC)  Issue controlled. Continue meds. Refilled meds. 6. Coronary artery disease involving native coronary artery of native heart with unstable angina pectoris ([address]  Issue controlled. Continue meds. Refilled meds. 7. Chronic obstructive pulmonary disease, unspecified COPD type ([address]  Issue controlled. Continue meds. Refilled meds. 8. Parkinson disease ([address]  Issue controlled. Continue meds. Refilled meds. - carbidopa-levodopa (SINEMET CR)  MG per extended release tablet; Take 1 tablet by mouth 2 times daily  Dispense: 60 tablet;  Refill: 11  -

## 2021-09-14 ENCOUNTER — TELEPHONE (OUTPATIENT)
Dept: FAMILY MEDICINE CLINIC | Age: 81
End: 2021-09-14

## 2021-09-15 ENCOUNTER — TELEPHONE (OUTPATIENT)
Dept: FAMILY MEDICINE CLINIC | Age: 81
End: 2021-09-15

## 2021-09-15 DIAGNOSIS — N41.0 ACUTE PROSTATITIS WITHOUT HEMATURIA: ICD-10-CM

## 2021-09-15 DIAGNOSIS — N41.0 ACUTE PROSTATITIS: Primary | ICD-10-CM

## 2021-09-15 DIAGNOSIS — N41.0 ACUTE PROSTATITIS WITHOUT HEMATURIA: Primary | ICD-10-CM

## 2021-09-16 LAB — URINE CULTURE, ROUTINE: NORMAL

## 2021-10-14 ENCOUNTER — OFFICE VISIT (OUTPATIENT)
Dept: FAMILY MEDICINE CLINIC | Age: 81
End: 2021-10-14
Payer: MEDICARE

## 2021-10-14 VITALS
TEMPERATURE: 98.4 F | WEIGHT: 189 LBS | OXYGEN SATURATION: 96 % | HEIGHT: 70 IN | BODY MASS INDEX: 27.06 KG/M2 | DIASTOLIC BLOOD PRESSURE: 74 MMHG | HEART RATE: 59 BPM | SYSTOLIC BLOOD PRESSURE: 120 MMHG

## 2021-10-14 DIAGNOSIS — E11.8 TYPE 2 DIABETES MELLITUS WITH COMPLICATION, WITHOUT LONG-TERM CURRENT USE OF INSULIN (HCC): Primary | ICD-10-CM

## 2021-10-14 DIAGNOSIS — M54.41 CHRONIC MIDLINE LOW BACK PAIN WITH RIGHT-SIDED SCIATICA: ICD-10-CM

## 2021-10-14 DIAGNOSIS — N40.1 BENIGN PROSTATIC HYPERPLASIA WITH URINARY FREQUENCY: ICD-10-CM

## 2021-10-14 DIAGNOSIS — G89.29 CHRONIC MIDLINE LOW BACK PAIN WITH RIGHT-SIDED SCIATICA: ICD-10-CM

## 2021-10-14 DIAGNOSIS — G20 PARKINSON DISEASE (HCC): ICD-10-CM

## 2021-10-14 DIAGNOSIS — R35.0 BENIGN PROSTATIC HYPERPLASIA WITH URINARY FREQUENCY: ICD-10-CM

## 2021-10-14 PROCEDURE — 3051F HG A1C>EQUAL 7.0%<8.0%: CPT | Performed by: FAMILY MEDICINE

## 2021-10-14 PROCEDURE — G8417 CALC BMI ABV UP PARAM F/U: HCPCS | Performed by: FAMILY MEDICINE

## 2021-10-14 PROCEDURE — 1036F TOBACCO NON-USER: CPT | Performed by: FAMILY MEDICINE

## 2021-10-14 PROCEDURE — G8484 FLU IMMUNIZE NO ADMIN: HCPCS | Performed by: FAMILY MEDICINE

## 2021-10-14 PROCEDURE — G8427 DOCREV CUR MEDS BY ELIG CLIN: HCPCS | Performed by: FAMILY MEDICINE

## 2021-10-14 PROCEDURE — 4040F PNEUMOC VAC/ADMIN/RCVD: CPT | Performed by: FAMILY MEDICINE

## 2021-10-14 PROCEDURE — 99214 OFFICE O/P EST MOD 30 MIN: CPT | Performed by: FAMILY MEDICINE

## 2021-10-14 PROCEDURE — 1123F ACP DISCUSS/DSCN MKR DOCD: CPT | Performed by: FAMILY MEDICINE

## 2021-10-16 PROBLEM — R35.0 BENIGN PROSTATIC HYPERPLASIA WITH URINARY FREQUENCY: Status: ACTIVE | Noted: 2021-10-16

## 2021-10-16 PROBLEM — N40.1 BENIGN PROSTATIC HYPERPLASIA WITH URINARY FREQUENCY: Status: ACTIVE | Noted: 2021-10-16

## 2021-10-16 NOTE — PROGRESS NOTES
Mildly hypertrophic LV. Mild mitral, aortic and tricuspid insufficiencies. 11/12/13 EF55-60% mil MR, mile TR, mild pulm htn    H/O echocardiogram 10/13/15    EF 05% Normal LV systolic function. Sclerotic aortic valve which is nonstenotic. Mod to Severe pulmonary HTN.     H/O exercise stress test 12/1/2014    normal resting ECG, no c/p, ventricular premature beats    Hiatal hernia     History of cardiac monitoring 10/14/15    Event - sinus rhythm    History of methicillin resistant staphylococcus aureus (MRSA)     Carrier of the nose 2012    History of nuclear stress test 09/01/2016    lexiscan-normal,EF64%    History of PFTs 1/9/14    History of recurrent TIAs     Hx of 24 hour EKG monitoring 8/11 08/11=NSR with physiological variation of heart rate, although mostly gloria rhythm; 04/00    Hx of cardiovascular stress test 6/12;6/11 06/12=normal perfusion EF 60%; 06/11=EF 65%; 03/11=EF 55%; 03/09=EF 64%; 09/07=EF 62%; 03/03;04/98;02/94;02/00    Hx of cardiovascular stress test 9/30/2015    lexiscan-normal,EF68%    Hx of Doppler echocardiogram 9/12    carotid 9/12=no sig. plaques seen; 08/05    Hx of Doppler ultrasound 6/08    peripheral doppler 06/08=no sig. PVD    Hx of Doppler ultrasound 9/30/15    Abdominal aortic aneurysm involving the distal abdomianal aorta with a maximum anterior posterior diameter of 3.03cm    Hx of echocardiogram 6/12;8/11 06/12=normal with EF 60%; 08/11=55%; 01/09=EF 55%; 02/04; 10/98; 10/97; 5/96; 5/95    Hx of fracture of vertebral column 9/2012    4 vertebra fractures    Hx of tilt table evaluation 07/01    marked vasovagal response with a drop of the BP without any increment in his heartrate.  Hx of transesophageal echocardiography (JONN) for monitoring 11/10    11/10=mild aortic regurg., probably atrial septal aneurysm, no other sig.  abnormality noted    Hyperhomocysteinemia (HCC)     Hyperlipidemia     Laceration of head 09/26/2012    6 staples    Lewy body dementia without behavioral disturbance ([facility] 2020    Neurogenic syncope 2001    Obstructive sleep apnea     Parkinson disease ([facility] 2019    Recurrent sinusitis     S/P dilatation of esophageal stricture     TIA (transient ischemic attack)     x2    Unspecified cerebral artery occlusion with cerebral infarction     Vertebral fracture        FAMILY HISTORY  Family History   Problem Relation Age of Onset    Arthritis Mother     Diabetes Mother     High Cholesterol Mother     Other Father         Brain aneurysm @37    Asthma Son     Seizures Son        SOCIAL HISTORY  Social History     Socioeconomic History    Marital status:      Spouse name: Not on file    Number of children: 2    Years of education: Not on file    Highest education level: Not on file   Occupational History    Not on file   Tobacco Use    Smoking status: Former Smoker     Packs/day: 2.00     Years: 31.00     Pack years: 62.00     Start date: 5/10/1943     Quit date: 1974     Years since quittin.8    Smokeless tobacco: Never Used    Tobacco comment: reviewed 10/13/2015   Vaping Use    Vaping Use: Never used   Substance and Sexual Activity    Alcohol use: No     Alcohol/week: 0.0 standard drinks    Drug use: No    Sexual activity: Yes     Partners: Female     Comment:     Other Topics Concern    Not on file   Social History Narrative    Not on file     Social Determinants of Health     Financial Resource Strain: Low Risk     Difficulty of Paying Living Expenses: Not hard at all   Food Insecurity: No Food Insecurity    Worried About Running Out of Food in the Last Year: Never true    Waqas of Food in the Last Year: Never true   Transportation Needs: No Transportation Needs    Lack of Transportation (Medical): No    Lack of Transportation (Non-Medical):  No   Physical Activity:     Days of Exercise per Week:     Minutes of Exercise per Session:    Stress:     Feeling of Stress : Social Connections:     Frequency of Communication with Friends and Family:     Frequency of Social Gatherings with Friends and Family:     Attends Latter day Services:     Active Member of Clubs or Organizations:     Attends Club or Organization Meetings:     Marital Status:    Intimate Partner Violence:     Fear of Current or Ex-Partner:     Emotionally Abused:     Physically Abused:     Sexually Abused:         SURGICAL HISTORY  Past Surgical History:   Procedure Laterality Date    ABDOMINAL AORTIC ANEURYSM REPAIR  9/23/09    3.6x2.7cm.  APPENDECTOMY      CARPAL TUNNEL RELEASE      right    CARPAL TUNNEL RELEASE Left     COLONOSCOPY      ELBOW SURGERY      left x4    ENDOSCOPY, COLON, DIAGNOSTIC      FOOT SURGERY      left    HERNIA REPAIR      INGUINAL HERNIA REPAIR Right     PTCA  5/11;4/98;3/94    05/11= 3.5x28 taxus stent of RCA and distal RCA with lesion 30-40%to be closely monitored. 04/98=mild LV dysfunction, mild CAD; 03/94    PTCA  11/2014    Stent to the LAD    PTCA      SINUS SURGERY      x4    TOTAL KNEE ARTHROPLASTY Right 10/2017    UPPER GASTROINTESTINAL ENDOSCOPY      twice       CURRENT MEDICATIONS  Current Outpatient Medications   Medication Sig Dispense Refill    atorvastatin (LIPITOR) 80 MG tablet TAKE ONE TABLET BY MOUTH DAILY 30 tablet 5    metFORMIN (GLUCOPHAGE) 500 MG tablet TWICE DAILY WITH MEALS 60 tablet 5    montelukast (SINGULAIR) 10 MG tablet TAKE 1 TABLET BY MOUTH EVERY DAY AT NIGHT 30 tablet 5    amLODIPine (NORVASC) 5 MG tablet Take 1 tablet by mouth daily 30 tablet 11    carbidopa-levodopa (SINEMET CR)  MG per extended release tablet Take 1 tablet by mouth 2 times daily 60 tablet 11    DULoxetine (CYMBALTA) 60 MG extended release capsule Take 1 capsule by mouth daily 30 capsule 11    omeprazole (PRILOSEC) 20 MG delayed release capsule Take 1 capsule by mouth 2 times daily 60 capsule 5    ipratropium (ATROVENT) 0.06 % nasal spray 2 sprays by Each Nostril route 3 times daily as needed for Rhinitis 1 each 5    donepezil (ARICEPT) 10 MG tablet Take 1 tablet by mouth daily 30 tablet 5    ipratropium-albuterol (DUONEB) 0.5-2.5 (3) MG/3ML SOLN nebulizer solution USE 1 VIAL PER NEBULIZER FOUR TIMES A DAY AS NEEDED. 360 mL 0    melatonin 3 MG TABS tablet Take 3 mg by mouth daily      promethazine (PHENERGAN) 25 MG tablet Take 25 mg by mouth every 6 hours as needed for Nausea      gabapentin (NEURONTIN) 400 MG capsule gabapentin 400 mg capsule   TAKE 1 CAPSULE BY MOUTH AT BEDTIME      sodium chloride 0.9 % irrigation RINSE SINUSES TWO TIMES A DAY AFTER MIXING 1 TUBE OF BACTROBAN OINTMENT INTO BOTTLE OF SOLUTION 6000 mL 5    clopidogrel (PLAVIX) 75 MG tablet Take 1 tablet by mouth every other day 15 tablet 5    mupirocin (BACTROBAN) 2 % ointment APPLY TO AFFECTED AREA(S) TWO TIMES A DAY 60 g 2    nitroGLYCERIN (NITROSTAT) 0.4 MG SL tablet Place 1 tablet under the tongue every 5 minutes as needed for Chest pain 25 tablet 1    albuterol (PROVENTIL) (2.5 MG/3ML) 0.083% nebulizer solution Take 2.5 mg by nebulization 4 times daily as needed      LANCETS MICRO THIN 33G MISC 1 Units by Does not apply route daily      blood glucose test strips (ASCENSIA AUTODISC VI;ONE TOUCH ULTRA TEST VI) strip 1 each by In Vitro route daily as needed As needed.  budesonide-formoterol (SYMBICORT) 80-4.5 MCG/ACT AERO Inhale 2 puffs into the lungs 2 times daily Rinse Mouth after use.  tiotropium (SPIRIVA RESPIMAT) 2.5 MCG/ACT AERS inhaler Inhale 2 puffs into the lungs 2 times daily       vitamin D (CHOLECALCIFEROL) 1000 UNIT TABS tablet Take 1,000 Units by mouth daily      aspirin 81 MG tablet Take 81 mg by mouth every other day       oxyCODONE HCl (OXY-IR) 10 MG immediate release tablet Take 10 mg by mouth 4 times daily as needed for Pain.  Loratadine 10 MG CAPS Take 10 mg by mouth daily. No current facility-administered medications for this visit. ALLERGIES  Allergies   Allergen Reactions    Nsaids     Tramadol Hcl      Confusion       PHYSICAL EXAM  /74   Pulse 59   Temp 98.4 °F (36.9 °C)   Ht 5' 10\" (1.778 m)   Wt 189 lb (85.7 kg)   SpO2 96%   BMI 27.12 kg/m²     ASSESSMENT & PLAN    1. Type 2 diabetes mellitus with complication, without long-term current use of insulin (HCC)  Issue is stable check labs today. Adjust medication off of lab results. - Hemoglobin A1C; Future    2. Parkinson disease ([address]  Issue controlled. Continue meds. Refilled meds. 3. Chronic midline low back pain with right-sided sciatica  Issue controlled. Continue meds. Refilled meds. 4. Benign prostatic hyperplasia with urinary frequency  Mild improvement  Unclear that presumptive treatment for prostatitis was of any sick significant help.   Offered urology consult-patient declined at this time    Patient billed off total time-30minutes  5 minutes prechart review  20 minutes spent with patient  5 minutes creating note            Electronically signed by Mack Veliz MD on 10/16/2021

## 2021-11-29 DIAGNOSIS — J32.9 RECURRENT SINUSITIS: ICD-10-CM

## 2021-12-20 ENCOUNTER — TELEPHONE (OUTPATIENT)
Dept: FAMILY MEDICINE CLINIC | Age: 81
End: 2021-12-20

## 2021-12-20 NOTE — TELEPHONE ENCOUNTER
Pt tested pos for covid-sx started 12/18--fever, congestion-can pt get the infusion? Please advise  bs has been running high 196ish.

## 2021-12-20 NOTE — TELEPHONE ENCOUNTER
Spoke with wife, temp 99 nonproductive cough, chest congestion, fatigue loss of appetite.  Filled out antibody infusion order to dr Mariano gibson to sign then fax

## 2021-12-22 ENCOUNTER — HOSPITAL ENCOUNTER (OUTPATIENT)
Dept: INFUSION THERAPY | Age: 81
Setting detail: INFUSION SERIES
Discharge: HOME | End: 2021-12-22
Payer: MEDICARE

## 2021-12-22 VITALS
TEMPERATURE: 96.5 F | OXYGEN SATURATION: 95 % | HEART RATE: 63 BPM | RESPIRATION RATE: 20 BRPM | DIASTOLIC BLOOD PRESSURE: 62 MMHG | SYSTOLIC BLOOD PRESSURE: 140 MMHG

## 2021-12-22 PROCEDURE — 6360000002 HC RX W HCPCS: Performed by: FAMILY MEDICINE

## 2021-12-22 PROCEDURE — 2500000003 HC RX 250 WO HCPCS: Performed by: FAMILY MEDICINE

## 2021-12-22 PROCEDURE — M0245 HC IV INFUSION BAMLANIVIMAB & ETESEVIMAB W/MONITORING: HCPCS

## 2021-12-22 PROCEDURE — 2580000003 HC RX 258: Performed by: FAMILY MEDICINE

## 2021-12-22 RX ORDER — SODIUM CHLORIDE 0.9 % (FLUSH) 0.9 %
5-40 SYRINGE (ML) INJECTION EVERY 12 HOURS SCHEDULED
Status: DISCONTINUED | OUTPATIENT
Start: 2021-12-22 | End: 2021-12-23 | Stop reason: HOSPADM

## 2021-12-22 RX ORDER — SODIUM CHLORIDE 0.9 % (FLUSH) 0.9 %
5-40 SYRINGE (ML) INJECTION PRN
Status: DISCONTINUED | OUTPATIENT
Start: 2021-12-22 | End: 2021-12-23 | Stop reason: HOSPADM

## 2021-12-22 RX ORDER — SODIUM CHLORIDE 9 MG/ML
25 INJECTION, SOLUTION INTRAVENOUS PRN
Status: DISCONTINUED | OUTPATIENT
Start: 2021-12-22 | End: 2021-12-23 | Stop reason: HOSPADM

## 2021-12-22 RX ADMIN — SODIUM CHLORIDE: 9 INJECTION, SOLUTION INTRAVENOUS at 15:23

## 2021-12-22 NOTE — PROGRESS NOTES
IV discontinued. DSD applied. Pt tolerated well. Discharged instructions given to pt, pt voiced understanding. Pt discharged via wheelchair by staff to exit.

## 2021-12-22 NOTE — PROGRESS NOTES
Pt taken to room COVID INFUSION. Pt oriented to room, call light, bed/chair controls, TV, pt voiced understanding. Plan of care explained to pt, pt voiced understanding.

## 2022-01-05 DIAGNOSIS — J32.9 RECURRENT SINUSITIS: ICD-10-CM

## 2022-01-06 ENCOUNTER — TELEPHONE (OUTPATIENT)
Dept: FAMILY MEDICINE CLINIC | Age: 82
End: 2022-01-06

## 2022-02-14 ENCOUNTER — OFFICE VISIT (OUTPATIENT)
Dept: FAMILY MEDICINE CLINIC | Age: 82
End: 2022-02-14
Payer: MEDICARE

## 2022-02-14 VITALS
BODY MASS INDEX: 27.06 KG/M2 | HEART RATE: 56 BPM | WEIGHT: 189 LBS | HEIGHT: 70 IN | OXYGEN SATURATION: 92 % | SYSTOLIC BLOOD PRESSURE: 138 MMHG | DIASTOLIC BLOOD PRESSURE: 82 MMHG

## 2022-02-14 DIAGNOSIS — J31.0 RHINITIS MEDICAMENTOSA: ICD-10-CM

## 2022-02-14 DIAGNOSIS — E11.8 TYPE 2 DIABETES MELLITUS WITH COMPLICATION, WITHOUT LONG-TERM CURRENT USE OF INSULIN (HCC): Primary | ICD-10-CM

## 2022-02-14 DIAGNOSIS — K21.9 GASTROESOPHAGEAL REFLUX DISEASE WITHOUT ESOPHAGITIS: ICD-10-CM

## 2022-02-14 DIAGNOSIS — T48.5X5A RHINITIS MEDICAMENTOSA: ICD-10-CM

## 2022-02-14 DIAGNOSIS — G31.83 LEWY BODY DEMENTIA WITHOUT BEHAVIORAL DISTURBANCE (HCC): ICD-10-CM

## 2022-02-14 DIAGNOSIS — I27.20 PULMONARY HYPERTENSION (HCC): ICD-10-CM

## 2022-02-14 DIAGNOSIS — J32.9 RECURRENT SINUSITIS: ICD-10-CM

## 2022-02-14 DIAGNOSIS — G20 PARKINSON DISEASE (HCC): ICD-10-CM

## 2022-02-14 DIAGNOSIS — J44.9 CHRONIC OBSTRUCTIVE PULMONARY DISEASE, UNSPECIFIED COPD TYPE (HCC): ICD-10-CM

## 2022-02-14 DIAGNOSIS — F02.80 LEWY BODY DEMENTIA WITHOUT BEHAVIORAL DISTURBANCE (HCC): ICD-10-CM

## 2022-02-14 PROCEDURE — 99214 OFFICE O/P EST MOD 30 MIN: CPT | Performed by: FAMILY MEDICINE

## 2022-02-14 PROCEDURE — G8484 FLU IMMUNIZE NO ADMIN: HCPCS | Performed by: FAMILY MEDICINE

## 2022-02-14 PROCEDURE — 1123F ACP DISCUSS/DSCN MKR DOCD: CPT | Performed by: FAMILY MEDICINE

## 2022-02-14 PROCEDURE — G8417 CALC BMI ABV UP PARAM F/U: HCPCS | Performed by: FAMILY MEDICINE

## 2022-02-14 PROCEDURE — 1036F TOBACCO NON-USER: CPT | Performed by: FAMILY MEDICINE

## 2022-02-14 PROCEDURE — 4040F PNEUMOC VAC/ADMIN/RCVD: CPT | Performed by: FAMILY MEDICINE

## 2022-02-14 PROCEDURE — G8427 DOCREV CUR MEDS BY ELIG CLIN: HCPCS | Performed by: FAMILY MEDICINE

## 2022-02-14 PROCEDURE — 3023F SPIROM DOC REV: CPT | Performed by: FAMILY MEDICINE

## 2022-02-14 RX ORDER — DONEPEZIL HYDROCHLORIDE 10 MG/1
10 TABLET, FILM COATED ORAL DAILY
Qty: 30 TABLET | Refills: 5 | Status: SHIPPED | OUTPATIENT
Start: 2022-02-14 | End: 2022-06-27

## 2022-02-14 RX ORDER — IPRATROPIUM BROMIDE 42 UG/1
2 SPRAY, METERED NASAL 3 TIMES DAILY PRN
Qty: 1 EACH | Refills: 5 | Status: SHIPPED | OUTPATIENT
Start: 2022-02-14 | End: 2022-10-03

## 2022-02-14 RX ORDER — GLIPIZIDE 5 MG/1
TABLET ORAL
Qty: 30 TABLET | Refills: 5 | Status: SHIPPED | OUTPATIENT
Start: 2022-02-14

## 2022-02-14 RX ORDER — MAGNESIUM HYDROXIDE 1200 MG/15ML
LIQUID ORAL
Qty: 6000 ML | Refills: 5 | Status: SHIPPED | OUTPATIENT
Start: 2022-02-14 | End: 2022-03-17

## 2022-02-14 RX ORDER — OMEPRAZOLE 20 MG/1
20 CAPSULE, DELAYED RELEASE ORAL 2 TIMES DAILY
Qty: 60 CAPSULE | Refills: 5 | Status: SHIPPED | OUTPATIENT
Start: 2022-02-14 | End: 2022-07-01

## 2022-02-14 RX ORDER — MEMANTINE HYDROCHLORIDE 5 MG/1
5 TABLET ORAL 2 TIMES DAILY
COMMUNITY

## 2022-02-14 NOTE — PROGRESS NOTES
Visual inspection:  Deformity/amputation: absent  Skin lesions/pre-ulcerative calluses: absent  Edema: right- negative, left- negative    Sensory exam:  Monofilament sensation: normal  (minimum of 5 random plantar locations tested, avoiding callused areas - > 1 area with absence of sensation is + for neuropathy)    Plus at least one of the following:  Pulses: abnormal - poorly palpable

## 2022-02-14 NOTE — PROGRESS NOTES
2/14/2022    [facility]    Chief Complaint   Patient presents with   Iesha Leiva Other     4 mth f/u    Other     no c/o's       HPI    Sal Santiago is a 79 y.o. male who presents today with follow-up. Sal Santiago has had some urinary incontinence and would benefit from adult pull-ups. Patient's neurologist has added Namenda to the Aricept. Both patient and wife got COVID and they both survived. His sugars and blood pressures were very high but are improving now. Patient has done well on glipizide before for his diabetes. REVIEW OF SYSTEMS    Constitutional:  Denies fever, chills, weight loss or weakness  Eyes:  no photophobia or discharge  ENT:  no sore throat or ear pain  Cardiovascular:  Denies chest pain, palpitations or swelling  Respiratory:  Denies cough or shortness of breath  GI:  no abdominal pain, nausea, vomiting, or diarrhea  Musculoskeletal:  no back pain  Skin:  No rashes  Neurologic: See above  Endocrine:  no polyuria or polydipsia      PAST MEDICAL HISTORY  Past Medical History:   Diagnosis Date    AAA (abdominal aortic aneurysm) without rupture (HCC)     Arthritis     CAD (coronary artery disease)     Cerebral aneurysm, nonruptured     Chronic low back pain     Chronic viral hepatitis B without coma and with delta agent ([address] 11/12/2020    Diabetes mellitus ([address]     GERD (gastroesophageal reflux disease)     H/O 24 hour EKG monitoring 12/7/2013    sinus rhythm, infreq PVCs    H/O cardiovascular stress test 10/30/14, 8/14/2013    10/30/14 Evidence of mild ischemia in the RCA. EF 64%  8/14/2013 EF58% small amount of ischemia    H/O Doppler ultrasound 09/04/2013    ABDOMINAL AORTIC US-it is noted that the max dimension is 3.24 is slightly higher than the previous one, suggest yearly abd aortic US, however this size is essentially unremarkable and is much less than 5 cm.     H/O Doppler ultrasound 10/27/14    ABD AORTIC US: small aortic aneurysm measuring 2.75cm    H/O echocardiogram head 2012    6 staples    Lewy body dementia without behavioral disturbance ([facility] 2020    Neurogenic syncope 2001    Obstructive sleep apnea     Parkinson disease ([facility] 2019    Recurrent sinusitis     S/P dilatation of esophageal stricture     TIA (transient ischemic attack)     x2    Unspecified cerebral artery occlusion with cerebral infarction     Vertebral fracture        FAMILY HISTORY  Family History   Problem Relation Age of Onset    Arthritis Mother     Diabetes Mother     High Cholesterol Mother     Other Father         Brain aneurysm @38    Asthma Son     Seizures Son        SOCIAL HISTORY  Social History     Socioeconomic History    Marital status:      Spouse name: Not on file    Number of children: 2    Years of education: Not on file    Highest education level: Not on file   Occupational History    Not on file   Tobacco Use    Smoking status: Former Smoker     Packs/day: 2.00     Years: 31.00     Pack years: 62.00     Start date: 5/10/1943     Quit date: 1974     Years since quittin.1    Smokeless tobacco: Never Used    Tobacco comment: reviewed 10/13/2015   Vaping Use    Vaping Use: Never used   Substance and Sexual Activity    Alcohol use: No     Alcohol/week: 0.0 standard drinks    Drug use: No    Sexual activity: Yes     Partners: Female     Comment:     Other Topics Concern    Not on file   Social History Narrative    Not on file     Social Determinants of Health     Financial Resource Strain: Low Risk     Difficulty of Paying Living Expenses: Not hard at all   Food Insecurity: No Food Insecurity    Worried About Running Out of Food in the Last Year: Never true    [place] of Food in the Last Year: Never true   Transportation Needs: No Transportation Needs    Lack of Transportation (Medical): No    Lack of Transportation (Non-Medical):  No   Physical Activity:     Days of Exercise per Week: Not on file    Minutes of Exercise per Session: Not on file   Stress:     Feeling of Stress : Not on file   Social Connections:     Frequency of Communication with Friends and Family: Not on file    Frequency of Social Gatherings with Friends and Family: Not on file    Attends Christian Services: Not on file    Active Member of Clubs or Organizations: Not on file    Attends Club or Organization Meetings: Not on file    Marital Status: Not on file   Intimate Partner Violence:     Fear of Current or Ex-Partner: Not on file    Emotionally Abused: Not on file    Physically Abused: Not on file    Sexually Abused: Not on file   Housing Stability:     Unable to Pay for Housing in the Last Year: Not on file    Number of [place] in the Last Year: Not on file    Unstable Housing in the Last Year: Not on file        SURGICAL HISTORY  Past Surgical History:   Procedure Laterality Date    ABDOMINAL AORTIC ANEURYSM REPAIR  9/23/09    3.6x2.7cm.  APPENDECTOMY      CARPAL TUNNEL RELEASE      right    CARPAL TUNNEL RELEASE Left     COLONOSCOPY      ELBOW SURGERY      left x4    ENDOSCOPY, COLON, DIAGNOSTIC      FOOT SURGERY      left    HERNIA REPAIR      INGUINAL HERNIA REPAIR Right     PTCA  5/11;4/98;3/94    05/11= 3.5x28 taxus stent of RCA and distal RCA with lesion 30-40%to be closely monitored. 04/98=mild LV dysfunction, mild CAD; 03/94    PTCA  11/2014    Stent to the LAD    PTCA      SINUS SURGERY      x4    TOTAL KNEE ARTHROPLASTY Right 10/2017    UPPER GASTROINTESTINAL ENDOSCOPY      twice       CURRENT MEDICATIONS  Current Outpatient Medications   Medication Sig Dispense Refill    memantine (NAMENDA) 5 MG tablet Take 5 mg by mouth 2 times daily neurology      mupirocin (BACTROBAN) 2 % ointment APPLY TOPICALLY TO THE AFFECTED AREA(S) TWO TIMES A DAY 60 g 0    sodium chloride 0.9 % irrigation RINSE SINUSES TWO TIMES A DAY AFTER MIXING 1 TUBE OF BACTROBAN OINTMENT INTO BOTTLE OF SOLUTION 6000 mL 5    omeprazole (PRILOSEC) 20 MG delayed release capsule Take 1 capsule by mouth 2 times daily 60 capsule 5    ipratropium (ATROVENT) 0.06 % nasal spray 2 sprays by Each Nostril route 3 times daily as needed for Rhinitis 1 each 5    donepezil (ARICEPT) 10 MG tablet Take 1 tablet by mouth daily 30 tablet 5    glipiZIDE (GLUCOTROL) 5 MG tablet Daily with a meal 30 tablet 5    atorvastatin (LIPITOR) 80 MG tablet TAKE ONE TABLET BY MOUTH DAILY 30 tablet 5    metFORMIN (GLUCOPHAGE) 500 MG tablet TWICE DAILY WITH MEALS 60 tablet 5    montelukast (SINGULAIR) 10 MG tablet TAKE 1 TABLET BY MOUTH EVERY DAY AT NIGHT 30 tablet 5    amLODIPine (NORVASC) 5 MG tablet Take 1 tablet by mouth daily 30 tablet 11    carbidopa-levodopa (SINEMET CR)  MG per extended release tablet Take 1 tablet by mouth 2 times daily 60 tablet 11    DULoxetine (CYMBALTA) 60 MG extended release capsule Take 1 capsule by mouth daily 30 capsule 11    ipratropium-albuterol (DUONEB) 0.5-2.5 (3) MG/3ML SOLN nebulizer solution USE 1 VIAL PER NEBULIZER FOUR TIMES A DAY AS NEEDED. 360 mL 0    melatonin 3 MG TABS tablet Take 3 mg by mouth daily      promethazine (PHENERGAN) 25 MG tablet Take 25 mg by mouth every 6 hours as needed for Nausea      gabapentin (NEURONTIN) 400 MG capsule gabapentin 400 mg capsule   TAKE 1 CAPSULE BY MOUTH AT BEDTIME      clopidogrel (PLAVIX) 75 MG tablet Take 1 tablet by mouth every other day 15 tablet 5    nitroGLYCERIN (NITROSTAT) 0.4 MG SL tablet Place 1 tablet under the tongue every 5 minutes as needed for Chest pain 25 tablet 1    albuterol (PROVENTIL) (2.5 MG/3ML) 0.083% nebulizer solution Take 2.5 mg by nebulization 4 times daily as needed      LANCETS MICRO THIN 33G MISC 1 Units by Does not apply route daily      blood glucose test strips (ASCENSIA AUTODISC VI;ONE TOUCH ULTRA TEST VI) strip 1 each by In Vitro route daily as needed As needed.       budesonide-formoterol (SYMBICORT) 80-4.5 MCG/ACT AERO Inhale 2 puffs into the lungs 2 times daily Rinse Mouth after use.  tiotropium (SPIRIVA RESPIMAT) 2.5 MCG/ACT AERS inhaler Inhale 2 puffs into the lungs 2 times daily       vitamin D (CHOLECALCIFEROL) 1000 UNIT TABS tablet Take 1,000 Units by mouth daily      aspirin 81 MG tablet Take 81 mg by mouth every other day       oxyCODONE HCl (OXY-IR) 10 MG immediate release tablet Take 10 mg by mouth 4 times daily as needed for Pain.  Loratadine 10 MG CAPS Take 10 mg by mouth daily. No current facility-administered medications for this visit. ALLERGIES  Allergies   Allergen Reactions    Nsaids     Tramadol Hcl      Confusion       PHYSICAL EXAM  /82   Pulse 56   Ht 5' 10\" (1.778 m)   Wt 189 lb (85.7 kg)   SpO2 92%   BMI 27.12 kg/m²     ASSESSMENT & PLAN    1. Type 2 diabetes mellitus with complication, without long-term current use of insulin ([facility])  Not at goal  Add glipizide 5 mg daily with a meal  Hold glipizide if not eating.  -  DIABETES FOOT EXAM  - glipiZIDE (GLUCOTROL) 5 MG tablet; Daily with a meal  Dispense: 30 tablet; Refill: 5    2. Parkinson disease ([facility]  Issue controlled. Continue meds. Refilled meds. 3. Lewy body dementia without behavioral disturbance ([facility]  Continue following up with neurology. Medicine is not reached peak benefit yet. - donepezil (ARICEPT) 10 MG tablet; Take 1 tablet by mouth daily  Dispense: 30 tablet; Refill: 5    4. Chronic obstructive pulmonary disease, unspecified COPD type ([facility]  Issue controlled. Continue meds. Refilled meds. 5. Pulmonary hypertension (HCC)  Issue controlled. Continue meds. Refilled meds. 6. Recurrent sinusitis  Treat as needed  - mupirocin (BACTROBAN) 2 % ointment; APPLY TOPICALLY TO THE AFFECTED AREA(S) TWO TIMES A DAY  Dispense: 60 g; Refill: 0    7. Gastroesophageal reflux disease without esophagitis  Issue controlled. Continue meds. Refilled meds. - omeprazole (PRILOSEC) 20 MG delayed release capsule;  Take 1 capsule by

## 2022-03-16 DIAGNOSIS — J32.9 RECURRENT SINUSITIS: ICD-10-CM

## 2022-03-17 RX ORDER — MAGNESIUM HYDROXIDE 1200 MG/15ML
LIQUID ORAL
Qty: 1000 ML | Refills: 5 | Status: SHIPPED | OUTPATIENT
Start: 2022-03-17 | End: 2022-06-27 | Stop reason: SDUPTHER

## 2022-03-19 DIAGNOSIS — J32.9 RECURRENT SINUSITIS: ICD-10-CM

## 2022-03-23 DIAGNOSIS — J32.9 RECURRENT SINUSITIS: ICD-10-CM

## 2022-03-23 RX ORDER — ATORVASTATIN CALCIUM 80 MG/1
TABLET, FILM COATED ORAL
Qty: 30 TABLET | Refills: 5 | Status: SHIPPED | OUTPATIENT
Start: 2022-03-23

## 2022-03-31 DIAGNOSIS — J32.9 RECURRENT SINUSITIS: ICD-10-CM

## 2022-03-31 NOTE — TELEPHONE ENCOUNTER
mupirocin (BACTROBAN) 2 % ointment       Please submit this again to pharmacy---the Pharmacist said there computer does not show it at all even though we received a \"Receipt\". Patient has been waiting since 3/17/22.

## 2022-05-18 ENCOUNTER — TELEMEDICINE (OUTPATIENT)
Dept: FAMILY MEDICINE CLINIC | Age: 82
End: 2022-05-18
Payer: MEDICARE

## 2022-05-18 DIAGNOSIS — Z00.00 MEDICARE ANNUAL WELLNESS VISIT, SUBSEQUENT: Primary | ICD-10-CM

## 2022-05-18 PROCEDURE — 4040F PNEUMOC VAC/ADMIN/RCVD: CPT | Performed by: FAMILY MEDICINE

## 2022-05-18 PROCEDURE — G0439 PPPS, SUBSEQ VISIT: HCPCS | Performed by: FAMILY MEDICINE

## 2022-05-18 PROCEDURE — 1123F ACP DISCUSS/DSCN MKR DOCD: CPT | Performed by: FAMILY MEDICINE

## 2022-05-18 RX ORDER — MONTELUKAST SODIUM 10 MG/1
TABLET ORAL
Qty: 30 TABLET | Refills: 5 | Status: SHIPPED | OUTPATIENT
Start: 2022-05-18

## 2022-05-18 ASSESSMENT — PATIENT HEALTH QUESTIONNAIRE - PHQ9
SUM OF ALL RESPONSES TO PHQ QUESTIONS 1-9: 0
2. FEELING DOWN, DEPRESSED OR HOPELESS: 0
SUM OF ALL RESPONSES TO PHQ9 QUESTIONS 1 & 2: 0
SUM OF ALL RESPONSES TO PHQ QUESTIONS 1-9: 0
1. LITTLE INTEREST OR PLEASURE IN DOING THINGS: 0

## 2022-05-18 ASSESSMENT — LIFESTYLE VARIABLES: HOW OFTEN DO YOU HAVE A DRINK CONTAINING ALCOHOL: NEVER

## 2022-05-18 NOTE — PATIENT INSTRUCTIONS
Personalized Preventive Plan for Araceli Bui - 5/18/2022  Medicare offers a range of preventive health benefits. Some of the tests and screenings are paid in full while other may be subject to a deductible, co-insurance, and/or copay. Some of these benefits include a comprehensive review of your medical history including lifestyle, illnesses that may run in your family, and various assessments and screenings as appropriate. After reviewing your medical record and screening and assessments performed today your provider may have ordered immunizations, labs, imaging, and/or referrals for you. A list of these orders (if applicable) as well as your Preventive Care list are included within your After Visit Summary for your review. Other Preventive Recommendations:    · A preventive eye exam performed by an eye specialist is recommended every 1-2 years to screen for glaucoma; cataracts, macular degeneration, and other eye disorders. · A preventive dental visit is recommended every 6 months. · Try to get at least 150 minutes of exercise per week or 10,000 steps per day on a pedometer . · Order or download the FREE \"Exercise & Physical Activity: Your Everyday Guide\" from [employer]. Call 2-888.732.9463 or search [employer] online. · You need 9628-8006 mg of calcium and 8074-2627 IU of vitamin D per day. It is possible to meet your calcium requirement with diet alone, but a vitamin D supplement is usually necessary to meet this goal.  · When exposed to the sun, use a sunscreen that protects against both UVA and UVB radiation with an SPF of 30 or greater. Reapply every 2 to 3 hours or after sweating, drying off with a towel, or swimming. · Always wear a seat belt when traveling in a car. Always wear a helmet when riding a bicycle or motorcycle.

## 2022-05-18 NOTE — PROGRESS NOTES
Medicare Annual Wellness Visit    Bruno Dasilva is here for Medicare AWV    Assessment & Plan   Medicare annual wellness visit, subsequent      Recommendations for Preventive Services Due: see orders and patient instructions/AVS.  Recommended screening schedule for the next 5-10 years is provided to the patient in written form: see Patient Instructions/AVS.     No follow-ups on file. Subjective       Patient's complete Health Risk Assessment and screening values have been reviewed and are found in Flowsheets. The following problems were reviewed today and where indicated follow up appointments were made and/or referrals ordered.     Positive Risk Factor Screenings with Interventions:    Fall Risk:  Do you feel unsteady or are you worried about falling? : no  2 or more falls in past year?: (!) yes (tripped)  Fall with injury in past year?: no     Fall Risk Interventions:    · Patient declines any further evaluation/treatment for this issue, states he didn't actually fall down            General Health and ACP:  General  In general, how would you say your health is?: Good  In the past 7 days, have you experienced any of the following: New or Increased Pain, New or Increased Fatigue, Loneliness, Social Isolation, Stress or Anger?: No  Do you get the social and emotional support that you need?: Yes  Do you have a Living Will?: Yes    Advance Directives     Power of  Living Will ACP-Advance Directive ACP-Power of     Not on File Not on File Not on File Not on File      General Health Risk Interventions:  · No Living Will: ACP documents already completed- patient asked to provide copy to the office     Hearing/Vision:  Do you or your family notice any trouble with your hearing that hasn't been managed with hearing aids?: (!) Yes  Do you have difficulty driving, watching TV, or doing any of your daily activities because of your eyesight?: No  Have you had an eye exam within the past year?: Yes  No exam data present    Hearing/Vision Interventions:  · Hearing concerns:  patient declines any further evaluation/treatment for hearing issues, states he wears hearing aids and they do help, but there are some things he misses     ADLs:  In the past 7 days, did you need help from others to perform any of the following everyday activities: Eating, dressing, grooming, bathing, toileting, or walking/balance?: No  In the past 7 days, did you need help from others to take care of any of the following: Laundry, housekeeping, banking/finances, shopping, telephone use, food preparation, transportation, or taking medications?: (!) Yes  Select all that apply: (!) Transportation (wife)    ADL Interventions:  · Patient declines any further evaluation/treatment for this issue, states his wife drives him          Objective      Patient-Reported Vitals  No data recorded     Unable to obtain 3 vital signs due to patient not having equipment to take blood pressure/temperature. Allergies   Allergen Reactions    Nsaids     Tramadol Hcl      Confusion     Prior to Visit Medications    Medication Sig Taking? Authorizing Provider   mupirocin (BACTROBAN) 2 % ointment Apply topically 3 times daily.  Yes Pauline Sol MD   atorvastatin (LIPITOR) 80 MG tablet TAKE ONE TABLET BY MOUTH DAILY Yes Pauline Sol MD   sodium chloride 0.9 % irrigation RINSE SINUSES 2 TIMES A DAY AFTER MIXING 1 TUBE OF BACTROBAN OINTMENT INTO BOTTLE OF SOLUTION Yes Pauline Sol MD   memantine (NAMENDA) 5 MG tablet Take 5 mg by mouth 2 times daily neurology Yes Historical Provider, MD   ipratropium (ATROVENT) 0.06 % nasal spray 2 sprays by Each Nostril route 3 times daily as needed for Rhinitis Yes Pauline Sol MD   glipiZIDE (GLUCOTROL) 5 MG tablet Daily with a meal Yes Pauline Sol MD   metFORMIN (GLUCOPHAGE) 500 MG tablet TWICE DAILY WITH MEALS Yes Pauline Sol MD   montelukast (SINGULAIR) 10 MG tablet TAKE 1 TABLET BY MOUTH EVERY DAY AT NIGHT Yes Reilly Ashby MD   amLODIPine (NORVASC) 5 MG tablet Take 1 tablet by mouth daily Yes Reilly Ashby MD   carbidopa-levodopa (SINEMET CR)  MG per extended release tablet Take 1 tablet by mouth 2 times daily Yes Reilly Ashby MD   DULoxetine (CYMBALTA) 60 MG extended release capsule Take 1 capsule by mouth daily Yes Reilly Ashby MD   ipratropium-albuterol (DUONEB) 0.5-2.5 (3) MG/3ML SOLN nebulizer solution USE 1 VIAL PER NEBULIZER FOUR TIMES A DAY AS NEEDED. Yes Reilly Ashby MD   melatonin 3 MG TABS tablet Take 3 mg by mouth daily Yes Historical Provider, MD   promethazine (PHENERGAN) 25 MG tablet Take 25 mg by mouth every 6 hours as needed for Nausea Yes Historical Provider, MD   gabapentin (NEURONTIN) 400 MG capsule gabapentin 400 mg capsule   TAKE 1 CAPSULE BY MOUTH AT BEDTIME Yes Historical Provider, MD   albuterol (PROVENTIL) (2.5 MG/3ML) 0.083% nebulizer solution Take 2.5 mg by nebulization 4 times daily as needed Yes Historical Provider, MD   LANCETS MICRO THIN 33G MISC 1 Units by Does not apply route daily Yes Historical Provider, MD   blood glucose test strips (ASCENSIA AUTODISC VI;ONE TOUCH ULTRA TEST VI) strip 1 each by In Vitro route daily as needed As needed. Yes Historical Provider, MD   budesonide-formoterol (SYMBICORT) 80-4.5 MCG/ACT AERO Inhale 2 puffs into the lungs 2 times daily Rinse Mouth after use. Yes Historical Provider, MD   tiotropium (SPIRIVA RESPIMAT) 2.5 MCG/ACT AERS inhaler Inhale 2 puffs into the lungs 2 times daily  Yes Historical Provider, MD   vitamin D (CHOLECALCIFEROL) 1000 UNIT TABS tablet Take 1,000 Units by mouth daily Yes Historical Provider, MD   aspirin 81 MG tablet Take 81 mg by mouth every other day  Yes Noa Alarcon MD   oxyCODONE HCl (OXY-IR) 10 MG immediate release tablet Take 10 mg by mouth 4 times daily as needed for Pain.   Yes Historical Provider, MD   Loratadine 10 MG CAPS Take 10 mg by mouth daily. Yes Historical Provider, MD   omeprazole (PRILOSEC) 20 MG delayed release capsule Take 1 capsule by mouth 2 times daily  Felipe Turner MD   donepezil (ARICEPT) 10 MG tablet Take 1 tablet by mouth daily  Felipe Turner MD   clopidogrel (PLAVIX) 75 MG tablet Take 1 tablet by mouth every other day  Felipe Turner MD   nitroGLYCERIN (NITROSTAT) 0.4 MG SL tablet Place 1 tablet under the tongue every 5 minutes as needed for Chest pain  Felipe Turner MD       CareTeam (Including outside providers/suppliers regularly involved in providing care):   Patient Care Team:  Felipe Turner MD as PCP - General  Felipe Turner MD as P[facility]  Franklin Moreno MD as Consulting Physician (Cardiology)     Reviewed and updated this visit:  Tobacco  Allergies  Meds  Med Hx  Surg Hx  Soc Hx  Fam Hx           Getachewilia Nigel, was evaluated through a synchronous (real-time) audio-video encounter. The patient (or guardian if applicable) is aware that this is a billable service, which includes applicable co-pays. This Virtual Visit was conducted with patient's (and/or legal guardian's) consent. The visit was conducted pursuant to the emergency declaration under the [address] [place] [address] authority and the [employer] Act. Patient identification was verified, and a caregiver was present when appropriate. The patient was located at home in a state where the provider was licensed to provide care. Eric Agarwal LPN, [id number], performed the documented evaluation under the direct supervision of the attending physician. Arin Manning, was evaluated through a synchronous (real-time) audio encounter. The patient (or guardian if applicable) is aware that this is a billable service, which includes applicable co-pays.  This Virtual Visit was conducted with patient's (and/or legal guardian's) consent. The visit was conducted pursuant to the emergency declaration under the [address] [place] [address] authority and the [employer] and [employer] Act. Patient identification was verified, and a caregiver was present when appropriate. The patient was located at home in a state where the provider was licensed to provide care. Total time spent for this encounter: Not billed by time    --Hallie Mcdaniel LPN on 9/49/4652 at 9:78 PM    An electronic signature was used to authenticate this note. This encounter was performed under Joshua lawson MDs, direct supervision, 5/18/2022.

## 2022-06-01 DIAGNOSIS — E11.8 TYPE 2 DIABETES MELLITUS WITH COMPLICATION, WITHOUT LONG-TERM CURRENT USE OF INSULIN (HCC): ICD-10-CM

## 2022-06-01 DIAGNOSIS — J32.9 RECURRENT SINUSITIS: ICD-10-CM

## 2022-06-12 DIAGNOSIS — K21.9 GASTROESOPHAGEAL REFLUX DISEASE WITHOUT ESOPHAGITIS: ICD-10-CM

## 2022-06-13 RX ORDER — OMEPRAZOLE 20 MG/1
CAPSULE, DELAYED RELEASE ORAL
Qty: 60 CAPSULE | Refills: 5 | OUTPATIENT
Start: 2022-06-13

## 2022-06-27 ENCOUNTER — OFFICE VISIT (OUTPATIENT)
Dept: FAMILY MEDICINE CLINIC | Age: 82
End: 2022-06-27
Payer: MEDICARE

## 2022-06-27 VITALS
WEIGHT: 197.8 LBS | SYSTOLIC BLOOD PRESSURE: 114 MMHG | RESPIRATION RATE: 16 BRPM | BODY MASS INDEX: 28.32 KG/M2 | HEART RATE: 56 BPM | DIASTOLIC BLOOD PRESSURE: 56 MMHG | OXYGEN SATURATION: 94 % | HEIGHT: 70 IN

## 2022-06-27 DIAGNOSIS — J31.0 CHRONIC RHINITIS: ICD-10-CM

## 2022-06-27 DIAGNOSIS — N39.41 URGE INCONTINENCE: ICD-10-CM

## 2022-06-27 DIAGNOSIS — E78.2 MIXED HYPERLIPIDEMIA: ICD-10-CM

## 2022-06-27 DIAGNOSIS — J32.9 RECURRENT SINUSITIS: ICD-10-CM

## 2022-06-27 DIAGNOSIS — R35.0 BENIGN PROSTATIC HYPERPLASIA WITH URINARY FREQUENCY: ICD-10-CM

## 2022-06-27 DIAGNOSIS — I71.40 AAA (ABDOMINAL AORTIC ANEURYSM) WITHOUT RUPTURE: ICD-10-CM

## 2022-06-27 DIAGNOSIS — B18.0 CHRONIC VIRAL HEPATITIS B WITHOUT COMA AND WITH DELTA AGENT (HCC): ICD-10-CM

## 2022-06-27 DIAGNOSIS — F02.80 LEWY BODY DEMENTIA WITHOUT BEHAVIORAL DISTURBANCE (HCC): ICD-10-CM

## 2022-06-27 DIAGNOSIS — E11.8 TYPE 2 DIABETES MELLITUS WITH COMPLICATION, WITHOUT LONG-TERM CURRENT USE OF INSULIN (HCC): Primary | ICD-10-CM

## 2022-06-27 DIAGNOSIS — G31.83 LEWY BODY DEMENTIA WITHOUT BEHAVIORAL DISTURBANCE (HCC): ICD-10-CM

## 2022-06-27 DIAGNOSIS — N40.1 BENIGN PROSTATIC HYPERPLASIA WITH URINARY FREQUENCY: ICD-10-CM

## 2022-06-27 DIAGNOSIS — G20 PARKINSON DISEASE (HCC): ICD-10-CM

## 2022-06-27 DIAGNOSIS — E72.11 HYPERHOMOCYSTEINEMIA (HCC): ICD-10-CM

## 2022-06-27 DIAGNOSIS — N18.31 STAGE 3A CHRONIC KIDNEY DISEASE (HCC): ICD-10-CM

## 2022-06-27 PROBLEM — N18.30 CHRONIC RENAL DISEASE, STAGE III (HCC): Status: ACTIVE | Noted: 2022-06-27

## 2022-06-27 PROCEDURE — G8427 DOCREV CUR MEDS BY ELIG CLIN: HCPCS | Performed by: STUDENT IN AN ORGANIZED HEALTH CARE EDUCATION/TRAINING PROGRAM

## 2022-06-27 PROCEDURE — G8417 CALC BMI ABV UP PARAM F/U: HCPCS | Performed by: STUDENT IN AN ORGANIZED HEALTH CARE EDUCATION/TRAINING PROGRAM

## 2022-06-27 PROCEDURE — 99214 OFFICE O/P EST MOD 30 MIN: CPT | Performed by: STUDENT IN AN ORGANIZED HEALTH CARE EDUCATION/TRAINING PROGRAM

## 2022-06-27 PROCEDURE — 1123F ACP DISCUSS/DSCN MKR DOCD: CPT | Performed by: STUDENT IN AN ORGANIZED HEALTH CARE EDUCATION/TRAINING PROGRAM

## 2022-06-27 PROCEDURE — 1036F TOBACCO NON-USER: CPT | Performed by: STUDENT IN AN ORGANIZED HEALTH CARE EDUCATION/TRAINING PROGRAM

## 2022-06-27 RX ORDER — MAGNESIUM HYDROXIDE 1200 MG/15ML
LIQUID ORAL
Qty: 1000 ML | Refills: 5 | Status: SHIPPED | OUTPATIENT
Start: 2022-06-27

## 2022-06-27 ASSESSMENT — ENCOUNTER SYMPTOMS
NAUSEA: 0
WHEEZING: 0
ABDOMINAL PAIN: 0
SORE THROAT: 0
SHORTNESS OF BREATH: 0

## 2022-06-27 NOTE — PROGRESS NOTES
6/27/2022    aNtali Cespedes    Chief Complaint   Patient presents with    3 Month Follow-Up     4 month ck - prostate issue- feels the urge to go all the time and gets up several times during the night.  Established New Doctor     NTP       HPI  History was obtained from patient. oLttie Hoover is a 79 y.o. male with a PMHx as listed below who presents today for 3 month follow up on chronic conditions. Chronic urinary incontinence patient     BP excellent on amlodipine    Following with Dr. Janet Chambers Neurology recent increase cymbalta to 90mg  Follows with [facility] [employer] [facility], stable    Hx. Skin cancer following with Dr. Giovanni Seaman in [address] [place] [address] with CPAP    Following with [facility]    1. Type 2 diabetes mellitus with complication, without long-term current use of insulin ([address]    2. Urge incontinence    3. Recurrent sinusitis    4. Stage 3a chronic kidney disease ([address]    5. AAA (abdominal aortic aneurysm) without rupture ([address]    6. Hyperhomocysteinemia ([address]    7. Chronic viral hepatitis B without coma and with delta agent ([address]    8. Parkinson disease ([address]    9. Lewy body dementia without behavioral disturbance ([address]    10. Benign prostatic hyperplasia with urinary frequency    11. Mixed hyperlipidemia    12. Chronic rhinitis             REVIEW OF SYMPTOMS    Review of Systems   Constitutional: Negative for chills and fatigue. HENT: Negative for congestion and sore throat. Respiratory: Negative for shortness of breath and wheezing. Cardiovascular: Negative for chest pain and palpitations. Gastrointestinal: Negative for abdominal pain and nausea. Genitourinary: Negative for frequency and urgency. Neurological: Negative for light-headedness.        PAST MEDICAL HISTORY  Past Medical History:   Diagnosis Date    AAA (abdominal aortic aneurysm) without rupture (HCC)     Arthritis     CAD (coronary artery disease)     Cerebral aneurysm, nonruptured     Chronic low back pain     Chronic renal disease, stage III [facility]) [[id number]] 6/27/2022    Chronic viral hepatitis B without coma and with delta agent ([facility] 11/12/2020    Diabetes mellitus ([facility]     GERD (gastroesophageal reflux disease)     H/O 24 hour EKG monitoring 12/7/2013    sinus rhythm, infreq PVCs    H/O cardiovascular stress test 10/30/14, 8/14/2013    10/30/14 Evidence of mild ischemia in the RCA. EF 64%  8/14/2013 EF58% small amount of ischemia    H/O Doppler ultrasound 09/04/2013    ABDOMINAL AORTIC US-it is noted that the max dimension is 3.24 is slightly higher than the previous one, suggest yearly abd aortic US, however this size is essentially unremarkable and is much less than 5 cm.  H/O Doppler ultrasound 10/27/14    ABD AORTIC US: small aortic aneurysm measuring 2.75cm    H/O echocardiogram 9/3/14, 11/12/2013    9/3/14 EF 60%. Mildly hypertrophic LV. Mild mitral, aortic and tricuspid insufficiencies. 11/12/13 EF55-60% mil MR, mile TR, mild pulm htn    H/O echocardiogram 10/13/15    EF 40% Normal LV systolic function. Sclerotic aortic valve which is nonstenotic. Mod to Severe pulmonary HTN.     H/O exercise stress test 12/1/2014    normal resting ECG, no c/p, ventricular premature beats    Hiatal hernia     History of cardiac monitoring 10/14/15    Event - sinus rhythm    History of methicillin resistant staphylococcus aureus (MRSA)     Carrier of the nose 2012    History of nuclear stress test 09/01/2016    lexiscan-normal,EF64%    History of PFTs 1/9/14    History of recurrent TIAs     Hx of 24 hour EKG monitoring 8/11 08/11=NSR with physiological variation of heart rate, although mostly gloria rhythm; 04/00    Hx of cardiovascular stress test 6/12;6/11 06/12=normal perfusion EF 60%; 06/11=EF 65%; 03/11=EF 55%; 03/09=EF 64%; 09/07=EF 62%; 03/03;04/98;02/94;02/00    Hx of cardiovascular stress test 9/30/2015    lexiscan-normal,EF68%    Hx of Doppler echocardiogram 9/12    carotid 9/12=no sig. plaques seen; 08/05    Hx of Doppler ultrasound 6/08    peripheral doppler 06/08=no sig. PVD    Hx of Doppler ultrasound 9/30/15    Abdominal aortic aneurysm involving the distal abdomianal aorta with a maximum anterior posterior diameter of 3.03cm    Hx of echocardiogram 6/12;8/11 06/12=normal with EF 60%; 08/11=55%; 01/09=EF 55%; 02/04; 10/98; 10/97; 5/96; 5/95    Hx of fracture of vertebral column 9/2012    4 vertebra fractures    Hx of tilt table evaluation 07/01    marked vasovagal response with a drop of the BP without any increment in his heartrate.  Hx of transesophageal echocardiography ([facility]) for monitoring 11/10    11/10=mild aortic regurg., probably atrial septal aneurysm, no other sig.  abnormality noted    Hyperhomocysteinemia ([address]     Hyperlipidemia     Laceration of head 09/26/2012    6 staples    Lewy body dementia without behavioral disturbance ([address] 11/12/2020    Neurogenic syncope 2001    Obstructive sleep apnea     Parkinson disease ([address] 9/6/2019    Recurrent sinusitis     S/P dilatation of esophageal stricture     TIA (transient ischemic attack)     x2    Unspecified cerebral artery occlusion with cerebral infarction     Vertebral fracture        FAMILY HISTORY  Family History   Problem Relation Age of Onset    Arthritis Mother     Diabetes Mother     High Cholesterol Mother     Other Father         Brain aneurysm @37    No Known Problems Sister     No Known Problems Brother     No Known Problems Brother     Heart Disease Sister     No Known Problems Sister     Asthma Son     Seizures Son        SOCIAL HISTORY  Social History     Socioeconomic History    Marital status:      Spouse name: Not on file    Number of children: 2    Years of education: Not on file    Highest education level: Not on file   Occupational History    Not on file   Tobacco Use    Smoking status: Former Smoker     Packs/day: 2.00     Years: 31.00     Pack years: 62.00     Start date: 5/10/1943     Quit date: 1974     Years since quittin.5    Smokeless tobacco: Never Used    Tobacco comment: reviewed 10/13/2015   Vaping Use    Vaping Use: Never used   Substance and Sexual Activity    Alcohol use: No     Alcohol/week: 0.0 standard drinks    Drug use: No    Sexual activity: Yes     Partners: Female     Comment:     Other Topics Concern    Not on file   Social History Narrative    Not on file     Social Determinants of Health     Financial Resource Strain:     Difficulty of Paying Living Expenses: Not on file   Food Insecurity:     Worried About Running Out of Food in the Last Year: Not on file    Waqas of Food in the Last Year: Not on file   Transportation Needs:     Lack of Transportation (Medical): Not on file    Lack of Transportation (Non-Medical): Not on file   Physical Activity: Insufficiently Active    Days of Exercise per Week: 7 days    Minutes of Exercise per Session: 20 min   Stress:     Feeling of Stress : Not on file   Social Connections:     Frequency of Communication with Friends and Family: Not on file    Frequency of Social Gatherings with Friends and Family: Not on file    Attends Nondenominational Services: Not on file    Active Member of Clubs or Organizations: Not on file    Attends Club or Organization Meetings: Not on file    Marital Status: Not on file   Intimate Partner Violence:     Fear of Current or Ex-Partner: Not on file    Emotionally Abused: Not on file    Physically Abused: Not on file    Sexually Abused: Not on file   Housing Stability:     Unable to Pay for Housing in the Last Year: Not on file    Number of [place] in the Last Year: Not on file    Unstable Housing in the Last Year: Not on file        SURGICAL HISTORY  Past Surgical History:   Procedure Laterality Date    ABDOMINAL AORTIC ANEURYSM REPAIR  09    3.6x2.7cm.     APPENDECTOMY      CARPAL TUNNEL RELEASE      right    CARPAL TUNNEL RELEASE Left  COLONOSCOPY      ELBOW SURGERY      left x4    ENDOSCOPY, COLON, DIAGNOSTIC      FOOT SURGERY      left    HERNIA REPAIR      INGUINAL HERNIA REPAIR Right     PTCA  5/11;4/98;3/94    05/11= 3.5x28 taxus stent of RCA and distal RCA with lesion 30-40%to be closely monitored. 04/98=mild LV dysfunction, mild CAD; 03/94    PTCA  11/2014    Stent to the LAD    PTCA      SINUS SURGERY      x4    TOTAL KNEE ARTHROPLASTY Right 10/2017    UPPER GASTROINTESTINAL ENDOSCOPY      twice                 CURRENT MEDICATIONS  Current Outpatient Medications   Medication Sig Dispense Refill    mupirocin (BACTROBAN) 2 % ointment Apply topically 3 times daily.  66 g 0    sodium chloride 0.9 % irrigation RINSE SINUSES 2 TIMES A DAY AFTER MIXING 1 TUBE OF BACTROBAN OINTMENT INTO BOTTLE OF SOLUTION 1000 mL 5    mirabegron (MYRBETRIQ) 25 MG TB24 Take 1 tablet by mouth daily 30 tablet 2    metFORMIN (GLUCOPHAGE) 500 MG tablet TAKE ONE TABLET BY MOUTH TWICE A DAY WITH MEALS 60 tablet 5    montelukast (SINGULAIR) 10 MG tablet take 1 tablet by mouth every day at night 30 tablet 5    atorvastatin (LIPITOR) 80 MG tablet TAKE ONE TABLET BY MOUTH DAILY 30 tablet 5    memantine (NAMENDA) 5 MG tablet Take 5 mg by mouth 2 times daily neurology      omeprazole (PRILOSEC) 20 MG delayed release capsule Take 1 capsule by mouth 2 times daily 60 capsule 5    ipratropium (ATROVENT) 0.06 % nasal spray 2 sprays by Each Nostril route 3 times daily as needed for Rhinitis 1 each 5    donepezil (ARICEPT) 10 MG tablet Take 1 tablet by mouth daily 30 tablet 5    glipiZIDE (GLUCOTROL) 5 MG tablet Daily with a meal 30 tablet 5    amLODIPine (NORVASC) 5 MG tablet Take 1 tablet by mouth daily 30 tablet 11    carbidopa-levodopa (SINEMET CR)  MG per extended release tablet Take 1 tablet by mouth 2 times daily 60 tablet 11    DULoxetine (CYMBALTA) 60 MG extended release capsule Take 1 capsule by mouth daily (Patient taking differently: Take 90 mg by mouth daily ) 30 capsule 11    ipratropium-albuterol (DUONEB) 0.5-2.5 (3) MG/3ML SOLN nebulizer solution USE 1 VIAL PER NEBULIZER FOUR TIMES A DAY AS NEEDED. 360 mL 0    melatonin 3 MG TABS tablet Take 3 mg by mouth daily      promethazine (PHENERGAN) 25 MG tablet Take 25 mg by mouth every 6 hours as needed for Nausea      gabapentin (NEURONTIN) 400 MG capsule gabapentin 400 mg capsule   TAKE 1 CAPSULE BY MOUTH AT BEDTIME      clopidogrel (PLAVIX) 75 MG tablet Take 1 tablet by mouth every other day 15 tablet 5    nitroGLYCERIN (NITROSTAT) 0.4 MG SL tablet Place 1 tablet under the tongue every 5 minutes as needed for Chest pain 25 tablet 1    albuterol (PROVENTIL) (2.5 MG/3ML) 0.083% nebulizer solution Take 2.5 mg by nebulization 4 times daily as needed      LANCETS MICRO THIN 33G MISC 1 Units by Does not apply route daily      blood glucose test strips (ASCENSIA AUTODISC VI;ONE TOUCH ULTRA TEST VI) strip 1 each by In Vitro route daily as needed As needed.  budesonide-formoterol (SYMBICORT) 80-4.5 MCG/ACT AERO Inhale 2 puffs into the lungs 2 times daily Rinse Mouth after use.  tiotropium (SPIRIVA RESPIMAT) 2.5 MCG/ACT AERS inhaler Inhale 2 puffs into the lungs 2 times daily       vitamin D (CHOLECALCIFEROL) 1000 UNIT TABS tablet Take 1,000 Units by mouth daily      aspirin 81 MG tablet Take 81 mg by mouth every other day       oxyCODONE HCl (OXY-IR) 10 MG immediate release tablet Take 10 mg by mouth 4 times daily as needed for Pain.  Loratadine 10 MG CAPS Take 10 mg by mouth daily. No current facility-administered medications for this visit.        ALLERGIES  Allergies   Allergen Reactions    Nsaids     Tramadol Hcl      Confusion       PHYSICAL EXAM    BP (!) 114/56 (Site: Left Upper Arm, Position: Sitting, Cuff Size: Medium Adult)   Pulse 56   Resp 16   Ht 5' 10\" (1.778 m)   Wt 197 lb 12.8 oz (89.7 kg)   SpO2 94%   BMI 28.38 kg/m²     Physical Exam  Constitutional: Appearance: Normal appearance. HENT:      Head: Normocephalic and atraumatic. Eyes:      Extraocular Movements: Extraocular movements intact. Pupils: Pupils are equal, round, and reactive to light. Cardiovascular:      Rate and Rhythm: Normal rate and regular rhythm. Pulses: Normal pulses. Heart sounds: No murmur heard. No friction rub. No gallop. Skin:     General: Skin is warm and dry. Neurological:      General: No focal deficit present. Mental Status: He is alert. Psychiatric:         Mood and Affect: Mood normal.         Behavior: Behavior normal.         ASSESSMENT & PLAN    1. Type 2 diabetes mellitus with complication, without long-term current use of insulin (HCC)    - CBC with Auto Differential; Future  - Hemoglobin A1C; Future  - Comprehensive Metabolic Panel; Future    2. Urge incontinence    - mirabegron (MYRBETRIQ) 25 MG TB24; Take 1 tablet by mouth daily  Dispense: 30 tablet; Refill: 2    3. Recurrent sinusitis    - mupirocin (BACTROBAN) 2 % ointment; Apply topically 3 times daily. Dispense: 66 g; Refill: 0    4. Stage 3a chronic kidney disease ([address]      5. AAA (abdominal aortic aneurysm) without rupture ([address]      6. Hyperhomocysteinemia ([address]      7. Chronic viral hepatitis B without coma and with delta agent ([address]      8. Parkinson disease ([address]      9. Lewy body dementia without behavioral disturbance ([address]      10. Benign prostatic hyperplasia with urinary frequency      11. Mixed hyperlipidemia    - LIPID PANEL; Future    12. Chronic rhinitis    - sodium chloride 0.9 % irrigation; RINSE SINUSES 2 TIMES A DAY AFTER MIXING 1 TUBE OF BACTROBAN OINTMENT INTO BOTTLE OF SOLUTION  Dispense: 1000 mL; Refill: 5    Trial of myrbetriq low dose, monitor bp  F/u labs continue current DM2 regimen  bp stable  Hx. parkinsons following with neuro    Return in about 4 months (around 10/27/2022).          Electronically signed by Aneesh Oneill DO on 6/27/2022

## 2022-07-01 DIAGNOSIS — K21.9 GASTROESOPHAGEAL REFLUX DISEASE WITHOUT ESOPHAGITIS: ICD-10-CM

## 2022-07-01 RX ORDER — OMEPRAZOLE 20 MG/1
CAPSULE, DELAYED RELEASE ORAL
Qty: 60 CAPSULE | Refills: 5 | Status: SHIPPED | OUTPATIENT
Start: 2022-07-01

## 2022-07-07 ENCOUNTER — OFFICE VISIT (OUTPATIENT)
Dept: FAMILY MEDICINE CLINIC | Age: 82
End: 2022-07-07
Payer: MEDICARE

## 2022-07-07 ENCOUNTER — HOSPITAL ENCOUNTER (OUTPATIENT)
Age: 82
Setting detail: SPECIMEN
Discharge: HOME | End: 2022-07-07
Payer: MEDICARE

## 2022-07-07 VITALS
WEIGHT: 196.4 LBS | HEART RATE: 60 BPM | TEMPERATURE: 96.8 F | BODY MASS INDEX: 28.18 KG/M2 | OXYGEN SATURATION: 95 % | RESPIRATION RATE: 12 BRPM

## 2022-07-07 DIAGNOSIS — J06.9 VIRAL URI WITH COUGH: Primary | ICD-10-CM

## 2022-07-07 LAB
ALBUMIN SERPL-MCNC: 4.4 G/DL
ALP BLD-CCNC: 109 U/L
ALT SERPL-CCNC: 6 U/L
ANION GAP SERPL CALCULATED.3IONS-SCNC: NORMAL MMOL/L
AST SERPL-CCNC: 12 U/L
AVERAGE GLUCOSE: NORMAL
BASOPHILS ABSOLUTE: 0 /ΜL
BASOPHILS RELATIVE PERCENT: 0.3 %
BILIRUB SERPL-MCNC: 1.4 MG/DL (ref 0.1–1.4)
BUN BLDV-MCNC: 15 MG/DL
CALCIUM SERPL-MCNC: 9.6 MG/DL
CHLORIDE BLD-SCNC: 98 MMOL/L
CHOLESTEROL, TOTAL: 122 MG/DL
CHOLESTEROL/HDL RATIO: NORMAL
CO2: 28 MMOL/L
CREAT SERPL-MCNC: 1.2 MG/DL
EOSINOPHILS ABSOLUTE: NORMAL
EOSINOPHILS RELATIVE PERCENT: 2.3 %
GFR CALCULATED: 45
GLUCOSE BLD-MCNC: 12 MG/DL
HBA1C MFR BLD: 7.6 %
HCT VFR BLD CALC: 41.2 % (ref 41–53)
HDLC SERPL-MCNC: 36 MG/DL (ref 35–70)
HEMOGLOBIN: 14 G/DL (ref 13.5–17.5)
LDL CHOLESTEROL CALCULATED: 58 MG/DL (ref 0–160)
LYMPHOCYTES ABSOLUTE: 1 /ΜL
LYMPHOCYTES RELATIVE PERCENT: 10.9 %
MCH RBC QN AUTO: 31.5 PG
MCHC RBC AUTO-ENTMCNC: 34 G/DL
MCV RBC AUTO: 92.6 FL
MONOCYTES ABSOLUTE: 0.9 /ΜL
MONOCYTES RELATIVE PERCENT: 9.5 %
NEUTROPHILS ABSOLUTE: 7.1 /ΜL
NEUTROPHILS RELATIVE PERCENT: 76.8 %
NONHDLC SERPL-MCNC: NORMAL MG/DL
PDW BLD-RTO: 12.1 %
PLATELET # BLD: NORMAL 10*3/UL
PMV BLD AUTO: 11.7 FL
POTASSIUM SERPL-SCNC: 4.8 MMOL/L
RBC # BLD: 4.45 10^6/ΜL
SODIUM BLD-SCNC: 134 MMOL/L
TOTAL PROTEIN: 7
TRIGL SERPL-MCNC: 139 MG/DL
VLDLC SERPL CALC-MCNC: 28 MG/DL
WBC # BLD: 9.2 10^3/ML

## 2022-07-07 PROCEDURE — G8427 DOCREV CUR MEDS BY ELIG CLIN: HCPCS | Performed by: PHYSICIAN ASSISTANT

## 2022-07-07 PROCEDURE — 1123F ACP DISCUSS/DSCN MKR DOCD: CPT | Performed by: PHYSICIAN ASSISTANT

## 2022-07-07 PROCEDURE — G8417 CALC BMI ABV UP PARAM F/U: HCPCS | Performed by: PHYSICIAN ASSISTANT

## 2022-07-07 PROCEDURE — U0005 INFEC AGEN DETEC AMPLI PROBE: HCPCS

## 2022-07-07 PROCEDURE — U0003 INFECTIOUS AGENT DETECTION BY NUCLEIC ACID (DNA OR RNA); SEVERE ACUTE RESPIRATORY SYNDROME CORONAVIRUS 2 (SARS-COV-2) (CORONAVIRUS DISEASE [COVID-19]), AMPLIFIED PROBE TECHNIQUE, MAKING USE OF HIGH THROUGHPUT TECHNOLOGIES AS DESCRIBED BY CMS-2020-01-R: HCPCS

## 2022-07-07 PROCEDURE — 99213 OFFICE O/P EST LOW 20 MIN: CPT | Performed by: PHYSICIAN ASSISTANT

## 2022-07-07 PROCEDURE — 1036F TOBACCO NON-USER: CPT | Performed by: PHYSICIAN ASSISTANT

## 2022-07-07 NOTE — PATIENT INSTRUCTIONS
Your COVID 19 test can take 1-5 days for the results to come back. We ask that you make a Mychart page and view your test results this way. You will need to Self quarantine until you know your results. If positive, please work on contact tracing. Increase fluids and rest  Saline nasal spray as needed for nasal congestion  Warm salt gargles as needed for throat discomfort  Monitor temperature twice a day  Tylenol as needed for fevers and/or discomfort. Big deep breaths periodically throughout the day  Regular Mucinex over the counter as needed for chest congestion  Continue nebulizer treatment as needed  Monitor SPO2 with finger pulse oximeter. If drops below 92%, go to the ER  If symptoms worsen -Go to the ER. Follow up with your primary care provider      To Whom it May Concern:    Mi Suarez was tested for COVID-19 7/7/2022. He/she must stay home until test results are back. If test is positive, isolate for a total of 5 days, starting from day 1 of symptom onset. After 5 days, if fever-free for 24 hours and there has been a gradual improvement in symptoms, may come out of isolation, but must consistently wear a mask when around other people for 5 additional days. (5 days isolation, 5 days mask-wearing). We do not recommend retesting as patients may continue to test positive for months even though no longer contagious. It is suggested you call [address] [facility] or [address] [place] [address] with any questions regarding isolation timeframe/return to work/school details. Karla Girssom PA-C        Patient Education        Viral Respiratory Infection: Care Instructions  Overview     A viral respiratory infection is an infection of the nose, sinuses, or throat caused by a virus. Colds and the flu are common types of viral respiratoryinfections. The symptoms of a viral respiratory infection often start quickly. They include a fever, sore throat, and runny nose. You may also just not feel well.  Or youmay not want to eat much. Most viral infections can be treated with home care. This may include drinking lots of fluids and taking over-the-counter pain medicine. You will probablyfeel better in 4 to 10 days. Antibiotics are not used to treat a viral infection. Antibiotics don't killviruses, so they won't help cure a viral illness. In some cases, a doctor may prescribe antiviral medicine to help your bodyfight a serious viral infection. Follow-up care is a key part of your treatment and safety. Be sure to make and go to all appointments, and call your doctor if you are having problems. It's also a good idea to know your test results and keep alist of the medicines you take. How can you care for yourself at home?  To prevent dehydration, drink plenty of fluids. Choose water and other clear liquids until you feel better. If you have kidney, heart, or liver disease and have to limit fluids, talk with your doctor before you increase the amount of fluids you drink.  Ask your doctor if you can take an over-the-counter pain medicine, such as acetaminophen (Tylenol), ibuprofen (Advil, Motrin), or naproxen (Aleve). Be safe with medicines. Read and follow all instructions on the label. No one younger than 20 should take aspirin. It has been linked to Reye syndrome, a serious illness.  Be careful when taking over-the-counter cold or flu medicines and Tylenol at the same time. Many of these medicines have acetaminophen, which is Tylenol. Read the labels to make sure that you are not taking more than the recommended dose. Too much acetaminophen (Tylenol) can be harmful.  Get plenty of rest.   Use saline (saltwater) nasal washes to help keep your nasal passages open and wash out mucus and allergens. You can buy saline nose sprays at a grocery store or drugstore. Follow the instructions on the package. Or you can make your own at home.  Add 1 teaspoon of non-iodized salt and 1 teaspoon of baking soda to 2 cups of distilled or boiled and cooled water. Fill a squeeze bottle or neti pot with the nasal wash. Then put the tip into your nostril, and lean over the sink. With your mouth open, gently squirt the liquid. Repeat on the other side.  Use a vaporizer or humidifier to add moisture to your bedroom. Follow the instructions for cleaning the machine.  Do not smoke or allow others to smoke around you. If you need help quitting, talk to your doctor about stop-smoking programs and medicines. These can increase your chances of quitting for good. When should you call for help? Call 911 anytime you think you may need emergency care. For example, call if:     You have severe trouble breathing. Call your doctor now or seek immediate medical care if:     You have a new or higher fever.      Your fever lasts more than 48 hours.      You have trouble breathing.      You have a fever with a stiff neck or a severe headache.      You are sensitive to light.      You feel very sleepy or confused. Watch closely for changes in your health, and be sure to contact your doctor if:     You do not get better as expected. Where can you learn more? Go to https://SnuppspeFlat.to.eFlix. org and sign in to your [employer] account. Enter T214 in the [employer] box to learn more about \"Viral Respiratory Infection: Care Instructions. \"     If you do not have an account, please click on the \"Sign Up Now\" link. Current as of: February 9, 2022               Content Version: 13.3  © 1395-9650 Healthwise, [facility]. Care instructions adapted under license by [facility] ([facility]). If you have questions about a medical condition or this instruction, always ask your healthcare professional. [place] [address] any warranty or liability for your use of this information.

## 2022-07-07 NOTE — PROGRESS NOTES
7/7/22  Thien Reyna  1940    FLU/COVID-19 CLINIC EVALUATION    HPI SYMPTOMS:    Employer: Retired    [] Fevers  [] Chills  [x] Cough  [] Coughing up blood  [] Chest Congestion  [x] Nasal Congestion  [x] Feeling short of breath  [x] Sometimes  [] Frequently  [] All the time  [] Chest pain  [x] Headaches  [x]Tolerable  [] Severe  [x] Sore throat  [x] Muscle aches  [] Nausea  [] Vomiting  []Unable to keep fluids down  [] Diarrhea  []Severe    [] OTHER SYMPTOMS:      Symptom Duration:   [] 1  [x] 2   [] 3   [] 4    [] 5   [] 6   [] 7   [] 8   [] 9   [] 10   [] 11   [] 12   [] 13   [] 14   [] Longer than 14 days    Symptom course:   [] Worsening     [x] Stable     [] Improving    RISK FACTORS:    [] Pregnant or possibly pregnant  [x] Age over 61  [x] Diabetes  [] Heart disease  [] Asthma  [x] COPD/Other chronic lung diseases  [] Active Cancer  [] On Chemotherapy  [] Taking oral steroids  [] History Lymphoma/Leukemia  [] Close contact with a lab confirmed COVID-19 patient within 14 days of symptom onset  [] History of travel from affected geographical areas within 14 days of symptom onset       VITALS:  There were no vitals filed for this visit. TESTS:    POCT FLU:  [] Positive     []Negative    ASSESSMENT:    [] Flu  [] Possible COVID-19  [] Strep    PLAN:    [] Discharge home with written instructions for:  [] Flu management  [] Possible COVID-19 infection with self-quarantine and management of symptoms  [] Follow-up with primary care physician or emergency department if worsens  [] Evaluation per physician/NP/PA in clinic  [] Sent to ER       An  electronic signature was used to authenticate this note.      --Sanjay Baeza on 7/7/2022 at 9:38 AM

## 2022-07-07 NOTE — PROGRESS NOTES
mitral, aortic and tricuspid insufficiencies. 11/12/13 EF55-60% mil MR, mile TR, mild pulm htn    H/O echocardiogram 10/13/15    EF 73% Normal LV systolic function. Sclerotic aortic valve which is nonstenotic. Mod to Severe pulmonary HTN.     H/O exercise stress test 12/1/2014    normal resting ECG, no c/p, ventricular premature beats    Hiatal hernia     History of cardiac monitoring 10/14/15    Event - sinus rhythm    History of methicillin resistant staphylococcus aureus (MRSA)     Carrier of the nose 2012    History of nuclear stress test 09/01/2016    lexiscan-normal,EF64%    History of PFTs 1/9/14    History of recurrent TIAs     Hx of 24 hour EKG monitoring 8/11 08/11=NSR with physiological variation of heart rate, although mostly gloria rhythm; 04/00    Hx of cardiovascular stress test 6/12;6/11 06/12=normal perfusion EF 60%; 06/11=EF 65%; 03/11=EF 55%; 03/09=EF 64%; 09/07=EF 62%; 03/03;04/98;02/94;02/00    Hx of cardiovascular stress test 9/30/2015    lexiscan-normal,EF68%    Hx of Doppler echocardiogram 9/12    carotid 9/12=no sig. plaques seen; 08/05    Hx of Doppler ultrasound 6/08    peripheral doppler 06/08=no sig. PVD    Hx of Doppler ultrasound 9/30/15    Abdominal aortic aneurysm involving the distal abdomianal aorta with a maximum anterior posterior diameter of 3.03cm    Hx of echocardiogram 6/12;8/11 06/12=normal with EF 60%; 08/11=55%; 01/09=EF 55%; 02/04; 10/98; 10/97; 5/96; 5/95    Hx of fracture of vertebral column 9/2012    4 vertebra fractures    Hx of tilt table evaluation 07/01    marked vasovagal response with a drop of the BP without any increment in his heartrate.  Hx of transesophageal echocardiography (JONN) for monitoring 11/10    11/10=mild aortic regurg., probably atrial septal aneurysm, no other sig.  abnormality noted    Hyperhomocysteinemia ([address]     Hyperlipidemia     Laceration of head 09/26/2012    6 staples    Lewy body dementia without Active    Days of Exercise per Week: 7 days    Minutes of Exercise per Session: 20 min   Stress:     Feeling of Stress : Not on file   Social Connections:     Frequency of Communication with Friends and Family: Not on file    Frequency of Social Gatherings with Friends and Family: Not on file    Attends Sabianism Services: Not on file    Active Member of Clubs or Organizations: Not on file    Attends Club or Organization Meetings: Not on file    Marital Status: Not on file   Intimate Partner Violence:     Fear of Current or Ex-Partner: Not on file    Emotionally Abused: Not on file    Physically Abused: Not on file    Sexually Abused: Not on file   Housing Stability:     Unable to Pay for Housing in the Last Year: Not on file    Number of [place] in the Last Year: Not on file    Unstable Housing in the Last Year: Not on file        SURGICAL HISTORY  Past Surgical History:   Procedure Laterality Date    ABDOMINAL AORTIC ANEURYSM REPAIR  9/23/09    3.6x2.7cm.  APPENDECTOMY      CARPAL TUNNEL RELEASE      right    CARPAL TUNNEL RELEASE Left     COLONOSCOPY      ELBOW SURGERY      left x4    ENDOSCOPY, COLON, DIAGNOSTIC      FOOT SURGERY      left    HERNIA REPAIR      INGUINAL HERNIA REPAIR Right     PTCA  5/11;4/98;3/94    05/11= 3.5x28 taxus stent of RCA and distal RCA with lesion 30-40%to be closely monitored. 04/98=mild LV dysfunction, mild CAD; 03/94    PTCA  11/2014    Stent to the LAD    PTCA      SINUS SURGERY      x4    TOTAL KNEE ARTHROPLASTY Right 10/2017    UPPER GASTROINTESTINAL ENDOSCOPY      twice       CURRENT MEDICATIONS  Current Outpatient Medications   Medication Sig Dispense Refill    omeprazole (PRILOSEC) 20 MG delayed release capsule TAKE ONE CAPSULE BY MOUTH TWICE A DAY 60 capsule 5    mupirocin (BACTROBAN) 2 % ointment Apply topically 3 times daily.  66 g 0    sodium chloride 0.9 % irrigation RINSE SINUSES 2 TIMES A DAY AFTER MIXING 1 TUBE OF Cherylyn Boot OINTMENT INTO BOTTLE OF SOLUTION 1000 mL 5    mirabegron (MYRBETRIQ) 25 MG TB24 Take 1 tablet by mouth daily 30 tablet 2    metFORMIN (GLUCOPHAGE) 500 MG tablet TAKE ONE TABLET BY MOUTH TWICE A DAY WITH MEALS 60 tablet 5    montelukast (SINGULAIR) 10 MG tablet take 1 tablet by mouth every day at night 30 tablet 5    atorvastatin (LIPITOR) 80 MG tablet TAKE ONE TABLET BY MOUTH DAILY 30 tablet 5    memantine (NAMENDA) 5 MG tablet Take 5 mg by mouth 2 times daily neurology      ipratropium (ATROVENT) 0.06 % nasal spray 2 sprays by Each Nostril route 3 times daily as needed for Rhinitis 1 each 5    donepezil (ARICEPT) 10 MG tablet Take 1 tablet by mouth daily 30 tablet 5    glipiZIDE (GLUCOTROL) 5 MG tablet Daily with a meal 30 tablet 5    amLODIPine (NORVASC) 5 MG tablet Take 1 tablet by mouth daily 30 tablet 11    carbidopa-levodopa (SINEMET CR)  MG per extended release tablet Take 1 tablet by mouth 2 times daily 60 tablet 11    DULoxetine (CYMBALTA) 60 MG extended release capsule Take 1 capsule by mouth daily (Patient taking differently: Take 90 mg by mouth daily ) 30 capsule 11    ipratropium-albuterol (DUONEB) 0.5-2.5 (3) MG/3ML SOLN nebulizer solution USE 1 VIAL PER NEBULIZER FOUR TIMES A DAY AS NEEDED. 360 mL 0    melatonin 3 MG TABS tablet Take 3 mg by mouth daily      promethazine (PHENERGAN) 25 MG tablet Take 25 mg by mouth every 6 hours as needed for Nausea      gabapentin (NEURONTIN) 400 MG capsule gabapentin 400 mg capsule   TAKE 1 CAPSULE BY MOUTH AT BEDTIME      clopidogrel (PLAVIX) 75 MG tablet Take 1 tablet by mouth every other day 15 tablet 5    nitroGLYCERIN (NITROSTAT) 0.4 MG SL tablet Place 1 tablet under the tongue every 5 minutes as needed for Chest pain 25 tablet 1    albuterol (PROVENTIL) (2.5 MG/3ML) 0.083% nebulizer solution Take 2.5 mg by nebulization 4 times daily as needed      LANCETS MICRO THIN 33G MISC 1 Units by Does not apply route daily      blood glucose test strips (ASCENSIA AUTODISC VI;ONE TOUCH ULTRA TEST VI) strip 1 each by In Vitro route daily as needed As needed.  budesonide-formoterol (SYMBICORT) 80-4.5 MCG/ACT AERO Inhale 2 puffs into the lungs 2 times daily Rinse Mouth after use.  tiotropium (SPIRIVA RESPIMAT) 2.5 MCG/ACT AERS inhaler Inhale 2 puffs into the lungs 2 times daily       vitamin D (CHOLECALCIFEROL) 1000 UNIT TABS tablet Take 1,000 Units by mouth daily      aspirin 81 MG tablet Take 81 mg by mouth every other day       oxyCODONE HCl (OXY-IR) 10 MG immediate release tablet Take 10 mg by mouth 4 times daily as needed for Pain.  Loratadine 10 MG CAPS Take 10 mg by mouth daily. No current facility-administered medications for this visit. ALLERGIES  Allergies   Allergen Reactions    Nsaids     Tramadol Hcl      Confusion       PHYSICAL EXAM    Pulse 60   Temp 96.8 °F (36 °C)   Resp 12   Wt 196 lb 6.4 oz (89.1 kg)   SpO2 95%   BMI 28.18 kg/m²     Constitutional:  Well developed, well nourished. Pleasant cooperative. No acute distress. HENT:  Normocephalic, atraumatic, bilateral external ears normal, hearing aids in place, bilateral ear canals and TMs normal, oropharynx moist, postnasal drip noted. No petechiae or exudate. Uvula midline and benign and airway patent. Nasal mucosa congested. Clear rhinorrhea  Eyes:  PERRLA, EOMI, conjunctiva normal, clear discharge bilaterally. No scleral icterus  Neck:  No tenderness, supple  Lymphatic:  No lymphadenopathy noted  Cardiovascular:  Normal heart rate, normal rhythm, no murmurs, gallops or rubs  Thorax & Lungs:  Normal breath sounds, no respiratory distress, no wheezing, no rales, no rhonchi  Skin:  Warm, dry, no erythema, no rash  Extremities:  No edema, no tenderness, no cyanosis, no palpable cord  Neurologic:  Alert & oriented   Psychiatric:  Affect normal, mood normal    ASSESSMENT & PLAN    Kareenenrique Chatman was seen today for cough.     Diagnoses and all orders for this visit:    Viral URI with cough  -     COVID-19        COVID-19 test results pending  Increase fluids and rest  Saline nasal spray as needed for nasal congestion  Warm salt gargles as needed for throat discomfort  Monitor temperature twice a day  Tylenol as needed for fevers and/or discomfort. Big deep breaths periodically throughout the day  Regular Mucinex over the counter as needed for chest congestion  Offered Tessalon Perles, patient declined. He states that he has cough suppressants at home already. Continue nebulizer treatment as needed  Monitor SPO2 with finger pulse oximeter. If drops below 92%, go to the ER  If symptoms worsen -Go to the ER. Follow up with your primary care provider    There are no discontinued medications. No follow-ups on file. Patient verbalizes understanding with the above plan and is in agreement. Patient will call with  questions or concerns. I did don appropriate PPE (including N95 face mask, protective safety glasses, face shield, gloves, and gown) as recommended by the health facility/national standard best practice, during my interaction with the patient. Please note that this chart was generated using dragon dictation software. Although every effort was made to ensure the accuracy of this automated transcription, some errors in transcription may have occurred.     Electronically signed by Neida Root PA-C on 7/7/2022

## 2022-07-08 ENCOUNTER — TELEPHONE (OUTPATIENT)
Dept: FAMILY MEDICINE CLINIC | Age: 82
End: 2022-07-08

## 2022-07-08 DIAGNOSIS — U07.1 COVID-19: Primary | ICD-10-CM

## 2022-07-08 LAB
SARS-COV-2: DETECTED
SOURCE: ABNORMAL

## 2022-07-08 NOTE — TELEPHONE ENCOUNTER
Left message for Tonio Valdivia as the provider at the walk in clinic reached out to us and wanted Dr. Bridgette David opinion. Dr. Javid Ellis sent in [id number] [facility] to start ASAP. Hold Atorvastatin only while on Paxlovid.

## 2022-07-12 DIAGNOSIS — E11.8 TYPE 2 DIABETES MELLITUS WITH COMPLICATION, WITHOUT LONG-TERM CURRENT USE OF INSULIN (HCC): ICD-10-CM

## 2022-07-12 DIAGNOSIS — E78.2 MIXED HYPERLIPIDEMIA: ICD-10-CM

## 2022-07-14 NOTE — RESULT ENCOUNTER NOTE
Please discuss with patient labs show elevated glucose/a1c.  I would like him to increase his metformin to 2 500mg tablets in AM and 1 tablet in PM otherwise labs normal

## 2022-07-19 DIAGNOSIS — E11.8 TYPE 2 DIABETES MELLITUS WITH COMPLICATION, WITHOUT LONG-TERM CURRENT USE OF INSULIN (HCC): ICD-10-CM

## 2022-07-19 PROBLEM — E66.3 OVERWEIGHT: Status: ACTIVE | Noted: 2019-05-14

## 2022-07-20 ENCOUNTER — TELEPHONE (OUTPATIENT)
Dept: FAMILY MEDICINE CLINIC | Age: 82
End: 2022-07-20

## 2022-07-20 NOTE — TELEPHONE ENCOUNTER
----- Message from [address] sent at 7/18/2022 12:47 PM EDT -----  Subject: Referral Request    Reason for referral request? Patient is seeking a referral to [facility] for a sleep test. Kevin SUNSHINE CNP is contact at [facility]. Please call patient when order is ready.   Provider patient wants to be referred to(if known):     Provider Phone Number(if known):[phone]    Additional Information for Provider?   ---------------------------------------------------------------------------  --------------  [address] [employer]    [phone]; OK to leave message on voicemail  ---------------------------------------------------------------------------  --------------

## 2022-07-20 NOTE — TELEPHONE ENCOUNTER
----- Message from [address] sent at 7/18/2022 12:48 PM EDT -----  Subject: Message to Provider    QUESTIONS  Information for Provider? Patient gave me the wrong [place] for [facility] [place]. The number is [phone]. Sorry about that  ---------------------------------------------------------------------------  --------------  Padmini ROSS  [phone]; OK to leave message on voicemail  ---------------------------------------------------------------------------  --------------  SCRIPT ANSWERS  Relationship to Patient?  Self

## 2022-07-20 NOTE — TELEPHONE ENCOUNTER
----- Message from [address] sent at 7/18/2022 12:47 PM EDT -----  Subject: Referral Request    Reason for referral request? Patient is seeking a referral to [facility] for a sleep test. Zaira SUNSHINE CNP is contact at [facility]. Please call patient when order is ready.   Provider patient wants to be referred to(if known):     Provider Phone Number(if known):[phone]    Additional Information for Provider?   ---------------------------------------------------------------------------  --------------  [address] [employer]    [phone]; OK to leave message on voicemail  ---------------------------------------------------------------------------  --------------

## 2022-07-20 NOTE — TELEPHONE ENCOUNTER
----- Message from [address] sent at 7/18/2022 12:48 PM EDT -----  Subject: Message to Provider    QUESTIONS  Information for Provider? Patient gave me the wrong number for [facility] [place]. The number is [phone]. Sorry about that  ---------------------------------------------------------------------------  --------------  Daryle Haver INFO  [phone]; OK to leave message on voicemail  ---------------------------------------------------------------------------  --------------  SCRIPT ANSWERS  Relationship to Patient?  Self

## 2022-07-21 DIAGNOSIS — G47.9 SLEEP DISORDER: Primary | ICD-10-CM

## 2022-07-27 ENCOUNTER — TELEPHONE (OUTPATIENT)
Dept: FAMILY MEDICINE CLINIC | Age: 82
End: 2022-07-27

## 2022-07-27 NOTE — TELEPHONE ENCOUNTER
----- Message from Norah Gómez sent at 7/22/2022 11:16 AM EDT -----  Subject: Message to Provider    QUESTIONS  Information for Provider? Patient called in to have recent referral for   sleep disorder sent to the [facility] , please advise.   ---------------------------------------------------------------------------  --------------  [id number]  [phone]; OK to leave message on voicemail  ---------------------------------------------------------------------------  --------------  SCRIPT ANSWERS  Relationship to Patient?  Self

## 2022-07-28 ENCOUNTER — TELEPHONE (OUTPATIENT)
Dept: FAMILY MEDICINE CLINIC | Age: 82
End: 2022-07-28

## 2022-07-28 DIAGNOSIS — G47.9 REPETITIVE INTRUSIONS OF SLEEP: Primary | ICD-10-CM

## 2022-07-28 NOTE — TELEPHONE ENCOUNTER
----- Message from [facility] sent at 7/28/2022  3:17 PM EDT -----  Subject: Message to Provider    QUESTIONS  Information for Provider? patient called in to speak to Beba Gallardo , he stated   that his referral was sent to the wrong place. ---------------------------------------------------------------------------  --------------  Jose De Jesus Neves BRISSA  [phone]; OK to leave message on voicemail  ---------------------------------------------------------------------------  --------------  SCRIPT ANSWERS  Relationship to Patient?  Self

## 2022-07-29 NOTE — TELEPHONE ENCOUNTER
Patient returned call and stated that the Sleep Study Referral was to go To [employer] Phone number [phone].   I did review the referral in [employer] with the patient and the address is for the [facility]. Patient did confirm that the referral was sent correctly

## 2022-10-03 DIAGNOSIS — J31.0 RHINITIS MEDICAMENTOSA: ICD-10-CM

## 2022-10-03 DIAGNOSIS — T48.5X5A RHINITIS MEDICAMENTOSA: ICD-10-CM

## 2022-10-03 RX ORDER — IPRATROPIUM BROMIDE 42 UG/1
SPRAY, METERED NASAL
Qty: 15 ML | Refills: 2 | Status: SHIPPED | OUTPATIENT
Start: 2022-10-03 | End: 2023-01-01

## 2022-10-19 DIAGNOSIS — J32.9 RECURRENT SINUSITIS: ICD-10-CM

## 2022-10-27 ENCOUNTER — OFFICE (OUTPATIENT)
Dept: URBAN - METROPOLITAN AREA CLINIC 18 | Facility: CLINIC | Age: 82
End: 2022-10-27
Payer: COMMERCIAL

## 2022-10-27 VITALS
SYSTOLIC BLOOD PRESSURE: 110 MMHG | DIASTOLIC BLOOD PRESSURE: 60 MMHG | HEIGHT: 72 IN | WEIGHT: 191 LBS | HEART RATE: 61 BPM

## 2022-10-27 DIAGNOSIS — K74.00 HEPATIC FIBROSIS, UNSPECIFIED: ICD-10-CM

## 2022-10-27 DIAGNOSIS — R19.4 CHANGE IN BOWEL HABIT: ICD-10-CM

## 2022-10-27 PROCEDURE — 99214 OFFICE O/P EST MOD 30 MIN: CPT | Performed by: INTERNAL MEDICINE

## 2022-11-16 RX ORDER — MONTELUKAST SODIUM 10 MG/1
TABLET ORAL
Qty: 90 TABLET | Refills: 1 | Status: SHIPPED | OUTPATIENT
Start: 2022-11-16 | End: 2023-05-16

## 2022-11-28 DIAGNOSIS — J44.9 CHRONIC OBSTRUCTIVE PULMONARY DISEASE, UNSPECIFIED COPD TYPE (HCC): ICD-10-CM

## 2022-11-28 RX ORDER — IPRATROPIUM BROMIDE AND ALBUTEROL SULFATE 2.5; .5 MG/3ML; MG/3ML
SOLUTION RESPIRATORY (INHALATION)
Qty: 360 ML | Refills: 0 | Status: SHIPPED | OUTPATIENT
Start: 2022-11-28

## 2022-12-01 ENCOUNTER — OFFICE VISIT (OUTPATIENT)
Dept: FAMILY MEDICINE CLINIC | Age: 82
End: 2022-12-01
Payer: MEDICARE

## 2022-12-01 VITALS
SYSTOLIC BLOOD PRESSURE: 127 MMHG | BODY MASS INDEX: 27.19 KG/M2 | HEART RATE: 59 BPM | WEIGHT: 189.9 LBS | RESPIRATION RATE: 16 BRPM | HEIGHT: 70 IN | DIASTOLIC BLOOD PRESSURE: 57 MMHG | OXYGEN SATURATION: 90 %

## 2022-12-01 DIAGNOSIS — Z23 NEED FOR INFLUENZA VACCINATION: ICD-10-CM

## 2022-12-01 DIAGNOSIS — G47.33 OSA ON CPAP: ICD-10-CM

## 2022-12-01 DIAGNOSIS — Z99.89 OSA ON CPAP: ICD-10-CM

## 2022-12-01 DIAGNOSIS — I25.110 CORONARY ARTERY DISEASE INVOLVING NATIVE CORONARY ARTERY OF NATIVE HEART WITH UNSTABLE ANGINA PECTORIS (HCC): ICD-10-CM

## 2022-12-01 DIAGNOSIS — N40.1 BENIGN PROSTATIC HYPERPLASIA WITH URINARY FREQUENCY: Primary | ICD-10-CM

## 2022-12-01 DIAGNOSIS — E11.8 TYPE 2 DIABETES MELLITUS WITH COMPLICATION, WITHOUT LONG-TERM CURRENT USE OF INSULIN (HCC): ICD-10-CM

## 2022-12-01 DIAGNOSIS — G20 PARKINSON DISEASE (HCC): ICD-10-CM

## 2022-12-01 DIAGNOSIS — F02.80 LEWY BODY DEMENTIA WITHOUT BEHAVIORAL DISTURBANCE (HCC): ICD-10-CM

## 2022-12-01 DIAGNOSIS — G31.83 LEWY BODY DEMENTIA WITHOUT BEHAVIORAL DISTURBANCE (HCC): ICD-10-CM

## 2022-12-01 DIAGNOSIS — R35.0 BENIGN PROSTATIC HYPERPLASIA WITH URINARY FREQUENCY: Primary | ICD-10-CM

## 2022-12-01 LAB — HBA1C MFR BLD: 7.1 %

## 2022-12-01 PROCEDURE — 3051F HG A1C>EQUAL 7.0%<8.0%: CPT | Performed by: STUDENT IN AN ORGANIZED HEALTH CARE EDUCATION/TRAINING PROGRAM

## 2022-12-01 PROCEDURE — G8417 CALC BMI ABV UP PARAM F/U: HCPCS | Performed by: STUDENT IN AN ORGANIZED HEALTH CARE EDUCATION/TRAINING PROGRAM

## 2022-12-01 PROCEDURE — 90694 VACC AIIV4 NO PRSRV 0.5ML IM: CPT | Performed by: STUDENT IN AN ORGANIZED HEALTH CARE EDUCATION/TRAINING PROGRAM

## 2022-12-01 PROCEDURE — 83036 HEMOGLOBIN GLYCOSYLATED A1C: CPT | Performed by: STUDENT IN AN ORGANIZED HEALTH CARE EDUCATION/TRAINING PROGRAM

## 2022-12-01 PROCEDURE — 99214 OFFICE O/P EST MOD 30 MIN: CPT | Performed by: STUDENT IN AN ORGANIZED HEALTH CARE EDUCATION/TRAINING PROGRAM

## 2022-12-01 PROCEDURE — 1123F ACP DISCUSS/DSCN MKR DOCD: CPT | Performed by: STUDENT IN AN ORGANIZED HEALTH CARE EDUCATION/TRAINING PROGRAM

## 2022-12-01 PROCEDURE — G8427 DOCREV CUR MEDS BY ELIG CLIN: HCPCS | Performed by: STUDENT IN AN ORGANIZED HEALTH CARE EDUCATION/TRAINING PROGRAM

## 2022-12-01 PROCEDURE — G0008 ADMIN INFLUENZA VIRUS VAC: HCPCS | Performed by: STUDENT IN AN ORGANIZED HEALTH CARE EDUCATION/TRAINING PROGRAM

## 2022-12-01 PROCEDURE — G8484 FLU IMMUNIZE NO ADMIN: HCPCS | Performed by: STUDENT IN AN ORGANIZED HEALTH CARE EDUCATION/TRAINING PROGRAM

## 2022-12-01 PROCEDURE — 1036F TOBACCO NON-USER: CPT | Performed by: STUDENT IN AN ORGANIZED HEALTH CARE EDUCATION/TRAINING PROGRAM

## 2022-12-01 RX ORDER — DONEPEZIL HYDROCHLORIDE 10 MG/1
10 TABLET, FILM COATED ORAL DAILY
Qty: 30 TABLET | Refills: 5 | Status: SHIPPED | OUTPATIENT
Start: 2022-12-01 | End: 2022-12-31

## 2022-12-01 RX ORDER — MEMANTINE HYDROCHLORIDE 5 MG/1
5 TABLET ORAL 2 TIMES DAILY
Qty: 60 TABLET | Refills: 5 | Status: SHIPPED | OUTPATIENT
Start: 2022-12-01 | End: 2023-05-30

## 2022-12-01 RX ORDER — NITROGLYCERIN 0.4 MG/1
0.4 TABLET SUBLINGUAL EVERY 5 MIN PRN
Qty: 25 TABLET | Refills: 1 | Status: SHIPPED | OUTPATIENT
Start: 2022-12-01 | End: 2022-12-31

## 2022-12-01 RX ORDER — TAMSULOSIN HYDROCHLORIDE 0.4 MG/1
0.4 CAPSULE ORAL DAILY
Qty: 30 CAPSULE | Refills: 1 | Status: SHIPPED | OUTPATIENT
Start: 2022-12-01 | End: 2023-01-30

## 2022-12-01 RX ORDER — ATORVASTATIN CALCIUM 80 MG/1
TABLET, FILM COATED ORAL
Qty: 30 TABLET | Refills: 5 | Status: SHIPPED | OUTPATIENT
Start: 2022-12-01

## 2022-12-01 RX ORDER — DULOXETIN HYDROCHLORIDE 60 MG/1
60 CAPSULE, DELAYED RELEASE ORAL DAILY
Qty: 30 CAPSULE | Refills: 11 | Status: SHIPPED | OUTPATIENT
Start: 2022-12-01 | End: 2023-12-01

## 2022-12-01 RX ORDER — CARBIDOPA AND LEVODOPA 25; 100 MG/1; MG/1
1 TABLET, EXTENDED RELEASE ORAL 2 TIMES DAILY
Qty: 60 TABLET | Refills: 11 | Status: SHIPPED | OUTPATIENT
Start: 2022-12-01 | End: 2023-12-01

## 2022-12-01 ASSESSMENT — PATIENT HEALTH QUESTIONNAIRE - PHQ9
SUM OF ALL RESPONSES TO PHQ QUESTIONS 1-9: 0
SUM OF ALL RESPONSES TO PHQ QUESTIONS 1-9: 0
2. FEELING DOWN, DEPRESSED OR HOPELESS: 0
SUM OF ALL RESPONSES TO PHQ9 QUESTIONS 1 & 2: 0
1. LITTLE INTEREST OR PLEASURE IN DOING THINGS: 0
SUM OF ALL RESPONSES TO PHQ QUESTIONS 1-9: 0
SUM OF ALL RESPONSES TO PHQ QUESTIONS 1-9: 0

## 2022-12-01 ASSESSMENT — ENCOUNTER SYMPTOMS
SORE THROAT: 0
WHEEZING: 0
SHORTNESS OF BREATH: 0
ABDOMINAL PAIN: 0
NAUSEA: 0

## 2022-12-01 NOTE — PROGRESS NOTES
Vaccine Information Sheet, \"Influenza - Inactivated\"  given to Tony Zhang, or parent/legal guardian of  Tony Zhang and verbalized understanding. Patient responses:    Have you ever had a reaction to a flu vaccine? No  Do you have any current illness? No  Have you ever had Guillian Parker Syndrome? No  Do you have a serious allergy to any of the follow: Neomycin, Polymyxin, Thimerosal, eggs or egg products? No    Flu vaccine given per order. Please see immunization tab. Risks and benefits explained. Current VIS given.       Immunizations Administered       Name Date Dose Route    Influenza, FLUAD, (age 72 y+), Adjuvanted, 0.5mL 12/1/2022 0.5 mL Intramuscular    Site: Deltoid- Right    Lot: [id number]    NDC: 15809-604-53

## 2022-12-01 NOTE — PROGRESS NOTES
12/1/2022    Alexandro Baker    Chief Complaint   Patient presents with    6 Month Follow-Up     No concerns today   Would like flu vaccine        HPI  History was obtained from patient. Harris Diez is a 79 y.o. male with a PMHx as listed below who presents today for follow up on chornic condtiions. No acute complaints    Recently had labs cardiology that is pending    Mood fairly stable     Follows with Dr. Eden Gasca to follow Neurology [facility] Dr. Timmie Cranker, recent increase in duloxetine 90mg stable on medication    Patient has old cpap and requesting new Rx.     1. Benign prostatic hyperplasia with urinary frequency    2. Parkinson disease ([address]    3. Lewy body dementia without behavioral disturbance ([address]    4. Type 2 diabetes mellitus with complication, without long-term current use of insulin ([address]    5. Coronary artery disease involving native coronary artery of native heart with unstable angina pectoris ([address]    6. MERARY on CPAP    7. Need for influenza vaccination             REVIEW OF SYMPTOMS    Review of Systems   Constitutional:  Negative for chills and fatigue. HENT:  Negative for congestion and sore throat. Respiratory:  Negative for shortness of breath and wheezing. Cardiovascular:  Negative for chest pain and palpitations. Gastrointestinal:  Negative for abdominal pain and nausea. Genitourinary:  Negative for frequency and urgency. Neurological:  Negative for light-headedness.      PAST MEDICAL HISTORY  Past Medical History:   Diagnosis Date    AAA (abdominal aortic aneurysm) without rupture (HCC)     Arthritis     CAD (coronary artery disease)     Cerebral aneurysm, nonruptured     Chronic low back pain     Chronic renal disease, stage III [facility]) [[id number]] 6/27/2022    Chronic viral hepatitis B without coma and with delta agent ([address] 11/12/2020    Diabetes mellitus ([address]     GERD (gastroesophageal reflux disease)     H/O 24 hour EKG monitoring 12/7/2013    sinus rhythm, infreq PVCs    H/O cardiovascular stress test 10/30/14, 8/14/2013    10/30/14 Evidence of mild ischemia in the RCA. EF 64%  8/14/2013 EF58% small amount of ischemia    H/O Doppler ultrasound 09/04/2013    ABDOMINAL AORTIC US-it is noted that the max dimension is 3.24 is slightly higher than the previous one, suggest yearly abd aortic US, however this size is essentially unremarkable and is much less than 5 cm. H/O Doppler ultrasound 10/27/14    ABD AORTIC US: small aortic aneurysm measuring 2.75cm    H/O echocardiogram 9/3/14, 11/12/2013    9/3/14 EF 60%. Mildly hypertrophic LV. Mild mitral, aortic and tricuspid insufficiencies. 11/12/13 EF55-60% mil MR, mile TR, mild pulm htn    H/O echocardiogram 10/13/15    EF 21% Normal LV systolic function. Sclerotic aortic valve which is nonstenotic. Mod to Severe pulmonary HTN.     H/O exercise stress test 12/1/2014    normal resting ECG, no c/p, ventricular premature beats    Hiatal hernia     History of cardiac monitoring 10/14/15    Event - sinus rhythm    History of methicillin resistant staphylococcus aureus (MRSA)     Carrier of the nose 2012    History of nuclear stress test 09/01/2016    lexiscan-normal,EF64%    History of PFTs 1/9/14    History of recurrent TIAs     Hx of 24 hour EKG monitoring 8/11 08/11=NSR with physiological variation of heart rate, although mostly gloria rhythm; 04/00    Hx of cardiovascular stress test 6/12;6/11 06/12=normal perfusion EF 60%; 06/11=EF 65%; 03/11=EF 55%; 03/09=EF 64%; 09/07=EF 62%; 03/03;04/98;02/94;02/00    Hx of cardiovascular stress test 9/30/2015    lexiscan-normal,EF68%    Hx of Doppler echocardiogram 9/12    carotid 9/12=no sig. plaques seen; 08/05    Hx of Doppler ultrasound 6/08    peripheral doppler 06/08=no sig.  PVD    Hx of Doppler ultrasound 9/30/15    Abdominal aortic aneurysm involving the distal abdomianal aorta with a maximum anterior posterior diameter of 3.03cm    Hx of echocardiogram ;=normal with EF 60%; =55%; =EF 55%; ; 10/98; 10/97; ;     Hx of fracture of vertebral column 2012    4 vertebra fractures    Hx of tilt table evaluation     marked vasovagal response with a drop of the BP without any increment in his heartrate. Hx of transesophageal echocardiography ([facility]) for monitoring 11/10    11/10=mild aortic regurg., probably atrial septal aneurysm, no other sig.  abnormality noted    Hyperhomocysteinemia (HCC)     Hyperlipidemia     Laceration of head 2012    6 staples    Lewy body dementia without behavioral disturbance [facility] 2020    Neurogenic syncope 2001    Obstructive sleep apnea     [facility] disease [facility] 2019    Recurrent sinusitis     S/P dilatation of esophageal stricture     TIA (transient ischemic attack)     x2    Unspecified cerebral artery occlusion with cerebral infarction     Vertebral fracture        FAMILY HISTORY  Family History   Problem Relation Age of Onset    Arthritis Mother     Diabetes Mother     High Cholesterol Mother     Other Father         Brain aneurysm @38    No Known Problems Sister     No Known Problems Brother     No Known Problems Brother     Heart Disease Sister     No Known Problems Sister     Asthma Son     Seizures Son        SOCIAL HISTORY  Social History     Socioeconomic History    Marital status:     Number of children: 2   Tobacco Use    Smoking status: Former     Packs/day: 2.00     Years: 31.00     Pack years: 62.00     Types: Cigarettes     Start date: 5/10/1943     Quit date: 1974     Years since quittin.9    Smokeless tobacco: Never    Tobacco comments:     reviewed 10/13/2015   Vaping Use    Vaping Use: Never used   Substance and Sexual Activity    Alcohol use: No     Alcohol/week: 0.0 standard drinks    Drug use: No    Sexual activity: Yes     Partners: Female     Comment:       Social Determinants of Health     Physical Activity: Insufficiently Active Days of Exercise per Week: 7 days    Minutes of Exercise per Session: 20 min        SURGICAL HISTORY  Past Surgical History:   Procedure Laterality Date    ABDOMINAL AORTIC ANEURYSM REPAIR  9/23/09    3.6x2.7cm. APPENDECTOMY      CARPAL TUNNEL RELEASE      right    CARPAL TUNNEL RELEASE Left     COLONOSCOPY      ELBOW SURGERY      left x4    ENDOSCOPY, COLON, DIAGNOSTIC      FOOT SURGERY      left    HERNIA REPAIR      INGUINAL HERNIA REPAIR Right     PTCA  5/11;4/98;3/94    05/11= 3.5x28 taxus stent of RCA and distal RCA with lesion 30-40%to be closely monitored. 04/98=mild LV dysfunction, mild CAD; 03/94    PTCA  11/2014    Stent to the LAD    PTCA      SINUS SURGERY      x4    TOTAL KNEE ARTHROPLASTY Right 10/2017    UPPER GASTROINTESTINAL ENDOSCOPY      twice                 CURRENT MEDICATIONS  Current Outpatient Medications   Medication Sig Dispense Refill    atorvastatin (LIPITOR) 80 MG tablet Take 1 Tablet by Mouth Daily 30 tablet 5    carbidopa-levodopa (SINEMET CR)  MG per extended release tablet Take 1 tablet by mouth 2 times daily 60 tablet 11    donepezil (ARICEPT) 10 MG tablet Take 1 tablet by mouth daily 30 tablet 5    DULoxetine (CYMBALTA) 60 MG extended release capsule Take 1 capsule by mouth daily 30 capsule 11    metFORMIN (GLUCOPHAGE) 500 MG tablet TAKE TWO TABLET BY MOUTH IN AM TAKE ONE TABLET IN PM DAILY WITH MEALS 270 tablet 1    nitroGLYCERIN (NITROSTAT) 0.4 MG SL tablet Place 1 tablet under the tongue every 5 minutes as needed for Chest pain 25 tablet 1    tamsulosin (FLOMAX) 0.4 MG capsule Take 1 capsule by mouth daily 30 capsule 1    memantine (NAMENDA) 5 MG tablet Take 1 tablet by mouth 2 times daily neurology 60 tablet 5    ipratropium-albuterol (DUONEB) 0.5-2.5 (3) MG/3ML SOLN nebulizer solution USE 1 VIAL PER NEBULIZER FOUR TIMES A DAY AS NEEDED. 360 mL 0    montelukast (SINGULAIR) 10 MG tablet TAKE 1 TABLET BY MOUTH AT BEDTIME 90 tablet 1    mupirocin (BACTROBAN) 2 % ointment APPLY TOPICALLY THREE TIMES DAILY 66 g 0    omeprazole (PRILOSEC) 20 MG delayed release capsule TAKE ONE CAPSULE BY MOUTH TWICE A DAY 60 capsule 5    sodium chloride 0.9 % irrigation RINSE SINUSES 2 TIMES A DAY AFTER MIXING 1 TUBE OF BACTROBAN OINTMENT INTO BOTTLE OF SOLUTION 1000 mL 5    glipiZIDE (GLUCOTROL) 5 MG tablet Daily with a meal 30 tablet 5    amLODIPine (NORVASC) 5 MG tablet Take 1 tablet by mouth daily 30 tablet 11    melatonin 3 MG TABS tablet Take 3 mg by mouth daily      promethazine (PHENERGAN) 25 MG tablet Take 25 mg by mouth every 6 hours as needed for Nausea      gabapentin (NEURONTIN) 400 MG capsule gabapentin 400 mg capsule   TAKE 1 CAPSULE BY MOUTH AT BEDTIME      clopidogrel (PLAVIX) 75 MG tablet Take 1 tablet by mouth every other day 15 tablet 5    albuterol (PROVENTIL) (2.5 MG/3ML) 0.083% nebulizer solution Take 2.5 mg by nebulization 4 times daily as needed      LANCETS MICRO THIN 33G MISC 1 Units by Does not apply route daily      blood glucose test strips (ASCENSIA AUTODISC VI;ONE TOUCH ULTRA TEST VI) strip 1 each by In Vitro route daily as needed As needed. budesonide-formoterol (SYMBICORT) 80-4.5 MCG/ACT AERO Inhale 2 puffs into the lungs 2 times daily Rinse Mouth after use. tiotropium (SPIRIVA RESPIMAT) 2.5 MCG/ACT AERS inhaler Inhale 2 puffs into the lungs 2 times daily       vitamin D (CHOLECALCIFEROL) 1000 UNIT TABS tablet Take 1,000 Units by mouth daily      aspirin 81 MG tablet Take 81 mg by mouth every other day       oxyCODONE HCl (OXY-IR) 10 MG immediate release tablet Take 10 mg by mouth 4 times daily as needed for Pain. Loratadine 10 MG CAPS Take 10 mg by mouth daily. ipratropium (ATROVENT) 0.06 % nasal spray SPRAY 2 SPRAYS IN EACH NOSTRIL THREE TIMES A DAY AS NEEDED FOR RUNNY NOSE 15 mL 2     No current facility-administered medications for this visit.        ALLERGIES  Allergies   Allergen Reactions    Nsaids     Tramadol Hcl      Confusion PHYSICAL EXAM    BP (!) 127/57   Pulse 59   Resp 16   Ht 5' 10\" (1.778 m)   Wt 189 lb 14.4 oz (86.1 kg)   SpO2 90%   BMI 27.25 kg/m²     Physical Exam  Constitutional:       Appearance: Normal appearance. HENT:      Head: Normocephalic and atraumatic. Eyes:      Extraocular Movements: Extraocular movements intact. Pupils: Pupils are equal, round, and reactive to light. Cardiovascular:      Rate and Rhythm: Normal rate and regular rhythm. Pulses: Normal pulses. Heart sounds: No murmur heard. No friction rub. No gallop. Skin:     General: Skin is warm and dry. Neurological:      General: No focal deficit present. Mental Status: He is alert. Psychiatric:         Mood and Affect: Mood normal.         Behavior: Behavior normal.       ASSESSMENT & PLAN    1. Parkinson disease (HCC)    - carbidopa-levodopa (SINEMET CR)  MG per extended release tablet; Take 1 tablet by mouth 2 times daily  Dispense: 60 tablet; Refill: 11  - DULoxetine (CYMBALTA) 60 MG extended release capsule; Take 1 capsule by mouth daily  Dispense: 30 capsule; Refill: 11  - memantine (NAMENDA) 5 MG tablet; Take 1 tablet by mouth 2 times daily neurology  Dispense: 60 tablet; Refill: 5    2. Lewy body dementia without behavioral disturbance ([facility])    - donepezil (ARICEPT) 10 MG tablet; Take 1 tablet by mouth daily  Dispense: 30 tablet; Refill: 5    3. Type 2 diabetes mellitus with complication, without long-term current use of insulin ([facility])    - metFORMIN (GLUCOPHAGE) 500 MG tablet; TAKE TWO TABLET BY MOUTH IN AM TAKE ONE TABLET IN PM DAILY WITH MEALS  Dispense: 270 tablet; Refill: 1  - POCT glycosylated hemoglobin (Hb A1C)    4. Benign prostatic hyperplasia with urinary frequency    - tamsulosin (FLOMAX) 0.4 MG capsule; Take 1 capsule by mouth daily  Dispense: 30 capsule; Refill: 1    5.  Coronary artery disease involving native coronary artery of native heart with unstable angina pectoris ([facility]    - atorvastatin (LIPITOR) 80 MG tablet; Take 1 Tablet by Mouth Daily  Dispense: 30 tablet; Refill: 5  - nitroGLYCERIN (NITROSTAT) 0.4 MG SL tablet; Place 1 tablet under the tongue every 5 minutes as needed for Chest pain  Dispense: 25 tablet; Refill: 1    6. MERARY on CPAP    - DME Order for CPAP as OP    A1c 7.1 excellent  She will need repeat cpap  Continue to follow with Neurology   Plan to follow with Sleep medicine, obtain new cpap    Return in about 4 months (around 4/1/2023).          Electronically signed by Remy Elias DO on 12/1/2022

## 2022-12-06 ENCOUNTER — AMBULATORY SURGICAL CENTER (OUTPATIENT)
Dept: URBAN - METROPOLITAN AREA SURGERY 5 | Facility: SURGERY | Age: 82
End: 2022-12-06
Payer: COMMERCIAL

## 2022-12-06 ENCOUNTER — OFFICE (OUTPATIENT)
Dept: URBAN - METROPOLITAN AREA PATHOLOGY 1 | Facility: PATHOLOGY | Age: 82
End: 2022-12-06
Payer: COMMERCIAL

## 2022-12-06 VITALS
DIASTOLIC BLOOD PRESSURE: 64 MMHG | HEART RATE: 56 BPM | HEART RATE: 57 BPM | SYSTOLIC BLOOD PRESSURE: 139 MMHG | SYSTOLIC BLOOD PRESSURE: 113 MMHG | SYSTOLIC BLOOD PRESSURE: 139 MMHG | HEART RATE: 56 BPM | OXYGEN SATURATION: 97 % | DIASTOLIC BLOOD PRESSURE: 81 MMHG | OXYGEN SATURATION: 99 % | SYSTOLIC BLOOD PRESSURE: 164 MMHG | DIASTOLIC BLOOD PRESSURE: 52 MMHG | SYSTOLIC BLOOD PRESSURE: 122 MMHG | RESPIRATION RATE: 16 BRPM | DIASTOLIC BLOOD PRESSURE: 57 MMHG | SYSTOLIC BLOOD PRESSURE: 127 MMHG | SYSTOLIC BLOOD PRESSURE: 129 MMHG | HEART RATE: 62 BPM | SYSTOLIC BLOOD PRESSURE: 123 MMHG | DIASTOLIC BLOOD PRESSURE: 63 MMHG | DIASTOLIC BLOOD PRESSURE: 65 MMHG | SYSTOLIC BLOOD PRESSURE: 145 MMHG | OXYGEN SATURATION: 90 % | HEART RATE: 64 BPM | HEART RATE: 51 BPM | HEART RATE: 52 BPM | DIASTOLIC BLOOD PRESSURE: 60 MMHG | SYSTOLIC BLOOD PRESSURE: 125 MMHG | WEIGHT: 183 LBS | OXYGEN SATURATION: 95 % | SYSTOLIC BLOOD PRESSURE: 114 MMHG | HEART RATE: 62 BPM | HEART RATE: 53 BPM | OXYGEN SATURATION: 93 % | SYSTOLIC BLOOD PRESSURE: 123 MMHG | TEMPERATURE: 97.6 F | SYSTOLIC BLOOD PRESSURE: 133 MMHG | SYSTOLIC BLOOD PRESSURE: 126 MMHG | HEIGHT: 72 IN | DIASTOLIC BLOOD PRESSURE: 57 MMHG | OXYGEN SATURATION: 94 % | SYSTOLIC BLOOD PRESSURE: 164 MMHG | RESPIRATION RATE: 9 BRPM | HEART RATE: 51 BPM | SYSTOLIC BLOOD PRESSURE: 113 MMHG | SYSTOLIC BLOOD PRESSURE: 127 MMHG | OXYGEN SATURATION: 92 % | DIASTOLIC BLOOD PRESSURE: 63 MMHG | SYSTOLIC BLOOD PRESSURE: 120 MMHG | SYSTOLIC BLOOD PRESSURE: 151 MMHG | RESPIRATION RATE: 9 BRPM | SYSTOLIC BLOOD PRESSURE: 133 MMHG | DIASTOLIC BLOOD PRESSURE: 81 MMHG | SYSTOLIC BLOOD PRESSURE: 125 MMHG | DIASTOLIC BLOOD PRESSURE: 73 MMHG | DIASTOLIC BLOOD PRESSURE: 60 MMHG | WEIGHT: 183 LBS | SYSTOLIC BLOOD PRESSURE: 114 MMHG | OXYGEN SATURATION: 93 % | OXYGEN SATURATION: 99 % | SYSTOLIC BLOOD PRESSURE: 145 MMHG | OXYGEN SATURATION: 94 % | DIASTOLIC BLOOD PRESSURE: 61 MMHG | HEART RATE: 54 BPM | TEMPERATURE: 97.6 F | HEART RATE: 53 BPM | RESPIRATION RATE: 10 BRPM | HEART RATE: 54 BPM | DIASTOLIC BLOOD PRESSURE: 64 MMHG | DIASTOLIC BLOOD PRESSURE: 52 MMHG | OXYGEN SATURATION: 96 % | DIASTOLIC BLOOD PRESSURE: 56 MMHG | HEART RATE: 52 BPM | DIASTOLIC BLOOD PRESSURE: 73 MMHG | RESPIRATION RATE: 11 BRPM | DIASTOLIC BLOOD PRESSURE: 56 MMHG | RESPIRATION RATE: 10 BRPM | HEART RATE: 57 BPM | OXYGEN SATURATION: 92 % | SYSTOLIC BLOOD PRESSURE: 151 MMHG | OXYGEN SATURATION: 96 % | DIASTOLIC BLOOD PRESSURE: 65 MMHG | OXYGEN SATURATION: 90 % | SYSTOLIC BLOOD PRESSURE: 120 MMHG | SYSTOLIC BLOOD PRESSURE: 129 MMHG | DIASTOLIC BLOOD PRESSURE: 61 MMHG | SYSTOLIC BLOOD PRESSURE: 126 MMHG | HEART RATE: 64 BPM | RESPIRATION RATE: 16 BRPM | DIASTOLIC BLOOD PRESSURE: 59 MMHG | OXYGEN SATURATION: 95 % | SYSTOLIC BLOOD PRESSURE: 122 MMHG | HEIGHT: 72 IN | DIASTOLIC BLOOD PRESSURE: 59 MMHG | RESPIRATION RATE: 11 BRPM | OXYGEN SATURATION: 97 %

## 2022-12-06 DIAGNOSIS — Z12.11 ENCOUNTER FOR SCREENING FOR MALIGNANT NEOPLASM OF COLON: ICD-10-CM

## 2022-12-06 DIAGNOSIS — D12.3 BENIGN NEOPLASM OF TRANSVERSE COLON: ICD-10-CM

## 2022-12-06 PROBLEM — K63.5 POLYP OF COLON: Status: ACTIVE | Noted: 2022-12-06

## 2022-12-06 PROCEDURE — 45385 COLONOSCOPY W/LESION REMOVAL: CPT | Mod: PT | Performed by: INTERNAL MEDICINE

## 2022-12-06 PROCEDURE — 88305 TISSUE EXAM BY PATHOLOGIST: CPT | Performed by: PATHOLOGY

## 2022-12-12 LAB
PDF: PDF REPORT: (no result)
PDF: PDF REPORT: (no result)

## 2022-12-15 DIAGNOSIS — J44.9 CHRONIC OBSTRUCTIVE PULMONARY DISEASE, UNSPECIFIED COPD TYPE (HCC): ICD-10-CM

## 2022-12-15 RX ORDER — IPRATROPIUM BROMIDE AND ALBUTEROL SULFATE 2.5; .5 MG/3ML; MG/3ML
SOLUTION RESPIRATORY (INHALATION)
Qty: 360 ML | Refills: 0 | OUTPATIENT
Start: 2022-12-15

## 2022-12-18 DIAGNOSIS — J31.0 CHRONIC RHINITIS: ICD-10-CM

## 2022-12-19 RX ORDER — MAGNESIUM HYDROXIDE 1200 MG/15ML
LIQUID ORAL
Qty: 1000 ML | Refills: 5 | Status: SHIPPED | OUTPATIENT
Start: 2022-12-19

## 2022-12-21 DIAGNOSIS — J32.9 RECURRENT SINUSITIS: ICD-10-CM

## 2023-01-18 DIAGNOSIS — J44.9 CHRONIC OBSTRUCTIVE PULMONARY DISEASE, UNSPECIFIED COPD TYPE (HCC): ICD-10-CM

## 2023-01-18 RX ORDER — IPRATROPIUM BROMIDE AND ALBUTEROL SULFATE 2.5; .5 MG/3ML; MG/3ML
SOLUTION RESPIRATORY (INHALATION)
Qty: 360 ML | Refills: 0 | Status: SHIPPED | OUTPATIENT
Start: 2023-01-18

## 2023-02-13 DIAGNOSIS — M54.41 CHRONIC MIDLINE LOW BACK PAIN WITH RIGHT-SIDED SCIATICA: Primary | ICD-10-CM

## 2023-02-13 DIAGNOSIS — G89.29 CHRONIC MIDLINE LOW BACK PAIN WITH RIGHT-SIDED SCIATICA: Primary | ICD-10-CM

## 2023-02-21 ENCOUNTER — TELEPHONE (OUTPATIENT)
Dept: FAMILY MEDICINE CLINIC | Age: 83
End: 2023-02-21

## 2023-02-21 NOTE — TELEPHONE ENCOUNTER
Patient called and stated that the [facility] did not receive the referral or any information. I reprinted the information that was scanned in from media mgr and re-faxed. I did receive a successful confirmation. I called [facility] and spoke to a staff person who stated that there was an automated msg sent out to patients that did not have referrals. There was a glitch in the system and the msg went to all of the patients in error. The referral and patient information was received 2-14-23. Patient was seen at [facility] on 2-6-23 and has an appt for [email or web address]. I called the patient and let him know what had happened. Patient voiced understanding.

## 2023-03-21 ENCOUNTER — TELEPHONE (OUTPATIENT)
Dept: FAMILY MEDICINE CLINIC | Age: 83
End: 2023-03-21

## 2023-03-21 DIAGNOSIS — J44.9 CHRONIC OBSTRUCTIVE PULMONARY DISEASE, UNSPECIFIED COPD TYPE (HCC): Primary | ICD-10-CM

## 2023-03-21 NOTE — TELEPHONE ENCOUNTER
----- Message from [place] sent at 3/21/2023 10:20 AM EDT -----  Subject: Referral Request    Reason for referral request? Pt is calling this morning to see if he can   get an order for a tubing kit for his nebulizer. Please call pt back. Provider patient wants to be referred to(if known):     Provider Phone Number(if known):     Additional Information for Provider?   ---------------------------------------------------------------------------  --------------  [address] [employer]    [phone]; OK to leave message on voicemail  ---------------------------------------------------------------------------  --------------

## 2023-03-21 NOTE — TELEPHONE ENCOUNTER
----- Message from [place] sent at 3/21/2023 10:21 AM EDT -----  Subject: Message to Provider    QUESTIONS  Information for Provider? Pt is wanting to add a fax number that the   office needs to send for his tubing kit to. Fax number is [phone].   ---------------------------------------------------------------------------  --------------  [address] [employer]  [phone]; OK to leave message on voicemail  ---------------------------------------------------------------------------  --------------  SCRIPT ANSWERS  Relationship to Patient?  Self

## 2023-03-23 ENCOUNTER — TELEPHONE (OUTPATIENT)
Dept: FAMILY MEDICINE CLINIC | Age: 83
End: 2023-03-23

## 2023-03-23 DIAGNOSIS — J32.9 RECURRENT SINUSITIS: Primary | ICD-10-CM

## 2023-03-23 NOTE — TELEPHONE ENCOUNTER
Patient said he would like a referral to Josephine Lima, [address] [facility]. Their fax # [phone].

## 2023-04-03 PROBLEM — I25.119 ATHEROSCLEROTIC HEART DISEASE OF NATIVE CORONARY ARTERY WITH UNSPECIFIED ANGINA PECTORIS (HCC): Status: ACTIVE | Noted: 2023-04-03

## 2023-04-03 PROBLEM — I20.9 ANGINA PECTORIS, UNSPECIFIED (HCC): Status: ACTIVE | Noted: 2023-04-03

## 2023-04-21 ENCOUNTER — TELEPHONE (OUTPATIENT)
Dept: FAMILY MEDICINE CLINIC | Age: 83
End: 2023-04-21

## 2023-04-21 DIAGNOSIS — K21.9 GASTROESOPHAGEAL REFLUX DISEASE WITHOUT ESOPHAGITIS: ICD-10-CM

## 2023-04-21 RX ORDER — OMEPRAZOLE 20 MG/1
20 CAPSULE, DELAYED RELEASE ORAL 2 TIMES DAILY
Qty: 60 CAPSULE | Refills: 5 | Status: SHIPPED | OUTPATIENT
Start: 2023-04-21

## 2023-04-21 NOTE — TELEPHONE ENCOUNTER
----- Message from [place] [address] sent at 4/21/2023  3:54 PM EDT -----  Subject: Refill Request    QUESTIONS  Name of Medication? omeprazole (PRILOSEC) 20 MG delayed release capsule  Patient-reported dosage and instructions? 2/day  How many days do you have left? 2  Preferred Pharmacy? [facility] [id number]  Pharmacy phone number (if available)? [phone]  ---------------------------------------------------------------------------  --------------  CALL BACK INFO  What is the best way for the office to contact you? OK to leave message on   voicemail  Preferred Call Back Phone Number? [phone]  ---------------------------------------------------------------------------  --------------  SCRIPT ANSWERS  Relationship to Patient?  Self

## 2023-04-28 DIAGNOSIS — J31.0 CHRONIC RHINITIS: ICD-10-CM

## 2023-04-28 DIAGNOSIS — J44.9 CHRONIC OBSTRUCTIVE PULMONARY DISEASE, UNSPECIFIED COPD TYPE (HCC): ICD-10-CM

## 2023-04-28 DIAGNOSIS — J32.9 RECURRENT SINUSITIS: ICD-10-CM

## 2023-04-28 RX ORDER — MAGNESIUM HYDROXIDE 1200 MG/15ML
LIQUID ORAL
Qty: 1000 ML | Refills: 5 | Status: SHIPPED | OUTPATIENT
Start: 2023-04-28

## 2023-04-28 RX ORDER — IPRATROPIUM BROMIDE AND ALBUTEROL SULFATE 2.5; .5 MG/3ML; MG/3ML
SOLUTION RESPIRATORY (INHALATION)
Qty: 360 ML | Refills: 10 | OUTPATIENT
Start: 2023-04-28

## 2023-04-28 RX ORDER — MAGNESIUM HYDROXIDE 1200 MG/15ML
LIQUID ORAL
Refills: 0 | OUTPATIENT
Start: 2023-04-28

## 2023-04-28 NOTE — TELEPHONE ENCOUNTER
----- Message from [employer] sent at 4/28/2023 10:44 AM EDT -----  Subject: Refill Request    QUESTIONS  Name of Medication? sodium chloride 0.9 % irrigation  Patient-reported dosage and instructions? .9%  How many days do you have left? 0  Preferred Pharmacy? [id number]  Pharmacy phone number (if available)? [phone]  ---------------------------------------------------------------------------  --------------,  Name of Medication? mupirocin (BACTROBAN) 2 % ointment  Patient-reported dosage and instructions? 2%  How many days do you have left? 0  Preferred Pharmacy? [id number]  Pharmacy phone number (if available)? [phone]  ---------------------------------------------------------------------------  --------------  CALL BACK INFO  What is the best way for the office to contact you? OK to leave message on   voicemail  Preferred Call Back Phone Number? [phone]  ---------------------------------------------------------------------------  --------------  SCRIPT ANSWERS  Relationship to Patient?  Self

## 2023-05-03 DIAGNOSIS — J31.0 CHRONIC RHINITIS: ICD-10-CM

## 2023-05-03 RX ORDER — MAGNESIUM HYDROXIDE 1200 MG/15ML
LIQUID ORAL
Qty: 1000 ML | Refills: 5 | Status: SHIPPED | OUTPATIENT
Start: 2023-05-03

## 2023-05-03 NOTE — TELEPHONE ENCOUNTER
----- Message from Neva Irene sent at 5/3/2023  1:51 PM EDT -----  Subject: Refill Request    QUESTIONS  Name of Medication? sod chloride IRR soln 0.9 % irrigation  Patient-reported dosage and instructions? 0.9% Solution, RINSE SINUSES 2   TIMES A DAY AFTER MIXING 1 TUBE OF BACTROBAN OINTMENT INTO BOTTLE OF   SOLUTION  How many days do you have left? 0  Preferred Pharmacy? [facility] [id number]  Pharmacy phone number (if available)? [phone]  Additional Information for Provider? Patient pharmacy stated this was   coded incorrect without instructions and this would need to be re sent to   pharmacy. Please follow up with patient due to having difficulty's getting   refilled. ---------------------------------------------------------------------------  --------------  Jamison Duane INFO  What is the best way for the office to contact you? OK to leave message on   voicemail  Preferred Call Back Phone Number? [phone]  ---------------------------------------------------------------------------  --------------  SCRIPT ANSWERS  Relationship to Patient?  Self

## 2023-05-03 NOTE — TELEPHONE ENCOUNTER
----- Message from Gadiel Srinath sent at 5/3/2023  1:51 PM EDT -----  Subject: Refill Request    QUESTIONS  Name of Medication? sod chloride IRR soln 0.9 % irrigation  Patient-reported dosage and instructions? 0.9% Solution, RINSE SINUSES 2   TIMES A DAY AFTER MIXING 1 TUBE OF BACTROBAN OINTMENT INTO BOTTLE OF   SOLUTION  How many days do you have left? 0  Preferred Pharmacy? [facility] [id number]  Pharmacy phone number (if available)? [phone]  Additional Information for Provider? Patient pharmacy stated this was   coded incorrect without instructions and this would need to be re sent to   pharmacy. Please follow up with patient due to having difficulty's getting   refilled. ---------------------------------------------------------------------------  --------------  Gela Mahmood INFO  What is the best way for the office to contact you? OK to leave message on   voicemail  Preferred Call Back Phone Number? [phone]  ---------------------------------------------------------------------------  --------------  SCRIPT ANSWERS  Relationship to Patient?  Self

## 2023-05-15 DIAGNOSIS — E11.8 TYPE 2 DIABETES MELLITUS WITH COMPLICATION, WITHOUT LONG-TERM CURRENT USE OF INSULIN (HCC): ICD-10-CM

## 2023-05-15 RX ORDER — MONTELUKAST SODIUM 10 MG/1
TABLET ORAL
Qty: 90 TABLET | Refills: 1 | Status: SHIPPED | OUTPATIENT
Start: 2023-05-15

## 2023-05-24 ENCOUNTER — TELEPHONE (OUTPATIENT)
Dept: FAMILY MEDICINE CLINIC | Age: 83
End: 2023-05-24

## 2023-06-01 ENCOUNTER — TELEPHONE (OUTPATIENT)
Dept: FAMILY MEDICINE CLINIC | Age: 83
End: 2023-06-01

## 2023-07-17 DIAGNOSIS — J44.9 CHRONIC OBSTRUCTIVE PULMONARY DISEASE, UNSPECIFIED COPD TYPE (HCC): ICD-10-CM

## 2023-07-17 RX ORDER — IPRATROPIUM BROMIDE AND ALBUTEROL SULFATE 2.5; .5 MG/3ML; MG/3ML
SOLUTION RESPIRATORY (INHALATION)
Qty: 360 ML | Refills: 0 | Status: SHIPPED | OUTPATIENT
Start: 2023-07-17

## 2023-07-20 DIAGNOSIS — E11.8 TYPE 2 DIABETES MELLITUS WITH COMPLICATION, WITHOUT LONG-TERM CURRENT USE OF INSULIN (HCC): ICD-10-CM

## 2023-08-06 DIAGNOSIS — J32.9 RECURRENT SINUSITIS: ICD-10-CM

## 2023-08-26 DIAGNOSIS — J44.9 CHRONIC OBSTRUCTIVE PULMONARY DISEASE, UNSPECIFIED COPD TYPE (HCC): ICD-10-CM

## 2023-08-28 RX ORDER — IPRATROPIUM BROMIDE AND ALBUTEROL SULFATE 2.5; .5 MG/3ML; MG/3ML
SOLUTION RESPIRATORY (INHALATION)
Qty: 360 ML | Refills: 0 | Status: SHIPPED | OUTPATIENT
Start: 2023-08-28

## 2023-09-26 DIAGNOSIS — G20.A1 PARKINSON DISEASE: ICD-10-CM

## 2023-09-27 ENCOUNTER — NURSE ONLY (OUTPATIENT)
Dept: FAMILY MEDICINE CLINIC | Age: 83
End: 2023-09-27

## 2023-09-27 DIAGNOSIS — Z48.89 SUTURE CHECK: Primary | ICD-10-CM

## 2023-09-27 PROCEDURE — 99999 PR OFFICE/OUTPT VISIT,PROCEDURE ONLY: CPT | Performed by: STUDENT IN AN ORGANIZED HEALTH CARE EDUCATION/TRAINING PROGRAM

## 2023-09-27 NOTE — PROGRESS NOTES
After having Helen Orantes look at sutures and finding out they had only been in 5 days she decided to have him come back on Monday to have them checked. I did clean his finger up and wound on left upper up, put triple antibiotic ointment on them and re-bandage with gauze and coban. He has appointment scheduled with Norberto Frias on Monday afternoon.

## 2023-09-28 RX ORDER — CARBIDOPA AND LEVODOPA 25; 100 MG/1; MG/1
1 TABLET, EXTENDED RELEASE ORAL 2 TIMES DAILY
Qty: 180 TABLET | Refills: 0 | Status: SHIPPED | OUTPATIENT
Start: 2023-09-28

## 2023-10-03 ENCOUNTER — OFFICE VISIT (OUTPATIENT)
Dept: FAMILY MEDICINE CLINIC | Age: 83
End: 2023-10-03

## 2023-10-03 ENCOUNTER — TELEPHONE (OUTPATIENT)
Dept: FAMILY MEDICINE CLINIC | Age: 83
End: 2023-10-03

## 2023-10-03 VITALS
RESPIRATION RATE: 20 BRPM | HEART RATE: 59 BPM | OXYGEN SATURATION: 95 % | DIASTOLIC BLOOD PRESSURE: 58 MMHG | SYSTOLIC BLOOD PRESSURE: 124 MMHG

## 2023-10-03 DIAGNOSIS — J32.9 RECURRENT SINUSITIS: ICD-10-CM

## 2023-10-03 DIAGNOSIS — S41.112A SKIN TEAR OF LEFT UPPER ARM WITHOUT COMPLICATION, INITIAL ENCOUNTER: ICD-10-CM

## 2023-10-03 DIAGNOSIS — Z48.02 VISIT FOR SUTURE REMOVAL: Primary | ICD-10-CM

## 2023-10-03 RX ORDER — DULOXETIN HYDROCHLORIDE 30 MG/1
CAPSULE, DELAYED RELEASE ORAL
COMMUNITY
Start: 2023-09-17

## 2023-10-03 NOTE — PROGRESS NOTES
10/3/2023    [facility]    Chief Complaint   Patient presents with    Suture / Staple Removal     Here for suture removal from left 4th digit. Patient has 2 sutures that need removed       HPI  History was obtained from patient and his wife. Sandra Hillman is a 79 y.o. male who presents today for suture removal.  He had 2 sutures placed in left ring finger 11 days ago at the ED. He has no complaints today. Skin tear on left upper arm is also healing well. PT denies any drainage, redness, swelling, fevers. PAST MEDICAL HISTORY  Past Medical History:   Diagnosis Date    AAA (abdominal aortic aneurysm) without rupture (HCC)     Arthritis     CAD (coronary artery disease)     Cerebral aneurysm, nonruptured     Chronic low back pain     Chronic renal disease, stage III [facility]) [[id number]] 6/27/2022    Chronic viral hepatitis B without coma and with delta agent ([address]) 11/12/2020    Diabetes mellitus ([address])     GERD (gastroesophageal reflux disease)     H/O 24 hour EKG monitoring 12/7/2013    sinus rhythm, infreq PVCs    H/O cardiovascular stress test 10/30/14, 8/14/2013    10/30/14 Evidence of mild ischemia in the RCA. EF 64%  8/14/2013 EF58% small amount of ischemia    H/O Doppler ultrasound 09/04/2013    ABDOMINAL AORTIC US-it is noted that the max dimension is 3.24 is slightly higher than the previous one, suggest yearly abd aortic US, however this size is essentially unremarkable and is much less than 5 cm. H/O Doppler ultrasound 10/27/14    ABD AORTIC US: small aortic aneurysm measuring 2.75cm    H/O echocardiogram 9/3/14, 11/12/2013    9/3/14 EF 60%. Mildly hypertrophic LV. Mild mitral, aortic and tricuspid insufficiencies. 11/12/13 EF55-60% mil MR, mile TR, mild pulm htn    H/O echocardiogram 10/13/15    EF 85% Normal LV systolic function. Sclerotic aortic valve which is nonstenotic.  Mod to Severe pulmonary HTN.     H/O exercise stress test 12/1/2014    normal resting ECG, no c/p, ventricular premature beats

## 2023-10-04 DIAGNOSIS — J44.9 CHRONIC OBSTRUCTIVE PULMONARY DISEASE, UNSPECIFIED COPD TYPE (HCC): ICD-10-CM

## 2023-10-05 RX ORDER — IPRATROPIUM BROMIDE AND ALBUTEROL SULFATE 2.5; .5 MG/3ML; MG/3ML
SOLUTION RESPIRATORY (INHALATION)
Qty: 360 ML | Refills: 0 | Status: SHIPPED | OUTPATIENT
Start: 2023-10-05

## 2023-12-04 RX ORDER — MONTELUKAST SODIUM 10 MG/1
TABLET ORAL
Qty: 90 TABLET | Refills: 0 | Status: SHIPPED | OUTPATIENT
Start: 2023-12-04

## 2023-12-23 DIAGNOSIS — G20.A1 PARKINSON DISEASE: ICD-10-CM

## 2023-12-26 RX ORDER — CARBIDOPA AND LEVODOPA 25; 100 MG/1; MG/1
1 TABLET, EXTENDED RELEASE ORAL 2 TIMES DAILY
Qty: 180 TABLET | Refills: 0 | Status: SHIPPED | OUTPATIENT
Start: 2023-12-26

## 2024-01-03 DIAGNOSIS — J31.0 CHRONIC RHINITIS: ICD-10-CM

## 2024-01-03 RX ORDER — MAGNESIUM HYDROXIDE 1200 MG/15ML
LIQUID ORAL
Qty: 1000 ML | Refills: 1 | Status: SHIPPED | OUTPATIENT
Start: 2024-01-03

## 2024-02-12 ENCOUNTER — TELEPHONE (OUTPATIENT)
Dept: FAMILY MEDICINE CLINIC | Age: 84
End: 2024-02-12

## 2024-02-12 NOTE — TELEPHONE ENCOUNTER
To Dr. Mobley-    Please fax over Chart Notes for the last year, that show the associated diagnosis and correlate with the medicine patient has been prescribed ---this is for billing purposes.    Fax#  [phone]

## 2024-02-21 DIAGNOSIS — J31.0 CHRONIC RHINITIS: ICD-10-CM

## 2024-02-22 RX ORDER — MAGNESIUM HYDROXIDE 1200 MG/15ML
LIQUID ORAL
Qty: 2000 ML | Refills: 2 | Status: SHIPPED | OUTPATIENT
Start: 2024-02-22 | End: 2024-05-22

## 2024-03-04 RX ORDER — MONTELUKAST SODIUM 10 MG/1
TABLET ORAL
Qty: 90 TABLET | Refills: 0 | Status: SHIPPED | OUTPATIENT
Start: 2024-03-04

## 2024-03-25 DIAGNOSIS — G20.A1 PARKINSON DISEASE (HCC): ICD-10-CM

## 2024-03-25 RX ORDER — CARBIDOPA AND LEVODOPA 25; 100 MG/1; MG/1
1 TABLET, EXTENDED RELEASE ORAL 2 TIMES DAILY
Qty: 180 TABLET | Refills: 0 | Status: SHIPPED | OUTPATIENT
Start: 2024-03-25

## 2024-04-06 DIAGNOSIS — K21.9 GASTROESOPHAGEAL REFLUX DISEASE WITHOUT ESOPHAGITIS: ICD-10-CM

## 2024-04-08 RX ORDER — OMEPRAZOLE 20 MG/1
20 CAPSULE, DELAYED RELEASE ORAL 2 TIMES DAILY
Qty: 60 CAPSULE | Refills: 5 | Status: SHIPPED | OUTPATIENT
Start: 2024-04-08

## 2024-05-31 RX ORDER — MONTELUKAST SODIUM 10 MG/1
TABLET ORAL
Qty: 90 TABLET | Refills: 0 | OUTPATIENT
Start: 2024-05-31

## 2025-02-09 DIAGNOSIS — J31.0 CHRONIC RHINITIS: ICD-10-CM

## 2025-02-10 RX ORDER — MAGNESIUM HYDROXIDE 1200 MG/15ML
LIQUID ORAL
Qty: 2000 ML | Refills: 2 | Status: SHIPPED | OUTPATIENT
Start: 2025-02-10